# Patient Record
Sex: FEMALE | Race: WHITE | NOT HISPANIC OR LATINO | Employment: FULL TIME | URBAN - METROPOLITAN AREA
[De-identification: names, ages, dates, MRNs, and addresses within clinical notes are randomized per-mention and may not be internally consistent; named-entity substitution may affect disease eponyms.]

---

## 2018-03-05 ENCOUNTER — OFFICE VISIT (OUTPATIENT)
Dept: CARDIOLOGY | Facility: CLINIC | Age: 43
End: 2018-03-05

## 2018-03-05 VITALS — HEART RATE: 80 BPM | DIASTOLIC BLOOD PRESSURE: 86 MMHG | WEIGHT: 195 LBS | SYSTOLIC BLOOD PRESSURE: 147 MMHG

## 2018-03-05 DIAGNOSIS — R06.02 SOB (SHORTNESS OF BREATH): ICD-10-CM

## 2018-03-05 DIAGNOSIS — E66.09 CLASS 1 OBESITY DUE TO EXCESS CALORIES WITHOUT SERIOUS COMORBIDITY WITH BODY MASS INDEX (BMI) OF 30.0 TO 30.9 IN ADULT: Primary | ICD-10-CM

## 2018-03-05 DIAGNOSIS — R03.0 BORDERLINE SYSTOLIC HTN: ICD-10-CM

## 2018-03-05 DIAGNOSIS — R07.9 CHEST PAIN, UNSPECIFIED TYPE: ICD-10-CM

## 2018-03-05 PROBLEM — E66.811 CLASS 1 OBESITY DUE TO EXCESS CALORIES WITHOUT SERIOUS COMORBIDITY WITH BODY MASS INDEX (BMI) OF 30.0 TO 30.9 IN ADULT: Status: ACTIVE | Noted: 2018-03-05

## 2018-03-05 PROCEDURE — 93000 ELECTROCARDIOGRAM COMPLETE: CPT | Performed by: INTERNAL MEDICINE

## 2018-03-05 PROCEDURE — 99203 OFFICE O/P NEW LOW 30 MIN: CPT | Performed by: INTERNAL MEDICINE

## 2018-03-05 RX ORDER — BISOPROLOL FUMARATE AND HYDROCHLOROTHIAZIDE 5; 6.25 MG/1; MG/1
1 TABLET ORAL DAILY
Qty: 30 TABLET | Refills: 6 | Status: SHIPPED | OUTPATIENT
Start: 2018-03-05 | End: 2018-12-01 | Stop reason: SDUPTHER

## 2018-03-05 NOTE — PROGRESS NOTES
Subjective:        CC  SOB    PINCHING CP    WT GAIN         Maria Ines Garcia is a 42 y.o. female who  Is here for the cardiac evaluation as the patient has been complaining of increasing shortness of breath, along with pinching chest pain mild in intensity lasting for a few seconds on the left side of the chest, patient also has gained significant weight over a period of several years     Maria Ines Garcia has been complaining of the shortness of breath mild-to-moderate in intensity with mild-to-moderate usually relieved with rest associated with anxiety and fatigue    Cardiac risk factors: dyslipidemia, family history of premature cardiovascular disease, hypertension, obesity (BMI >= 30 kg/m2) and sedentary lifestyle.    Past Medical History:   Diagnosis Date   • Hyperlipidemia    • Hypertension     reports that she has quit smoking. She does not have any smokeless tobacco history on file. She reports that she drinks alcohol.  Family History   Problem Relation Age of Onset   • Heart disease Mother    • Hypertension Mother    • Heart disease Paternal Uncle    • Arrhythmia Maternal Grandmother         Review of Systems  Constitutional: No wt loss, fever   Gastrointestinal: No nausea , abdominal pain  Behavioral/Psych: No insomnia or anxiety  Cardiovascular ----positive for Chest pains and shortness of breath. All other systems reviewed and are negative    Objective:              Physical Exam  /86  Pulse 80  Wt 88.5 kg (195 lb)    General appearance: NAD, conversant   Eyes: anicteric sclerae, moist conjunctivae; no lid-lag; PERRLA   HENT: Atraumatic; oropharynx clear with moist mucous membranes and no mucosal ulcerations;  normal hard and soft palate   Neck: Trachea midline; FROM, supple, no thyromegaly or lymphadenopathy   Lungs: CTA, with normal respiratory effort and no intercostal retractions   CV: S1-S2 regular, no murmurs, no rub, no gallop   Abdomen: Soft, non-tender; no masses or HSM   Extremities: No  peripheral edema or extremity lymphadenopathy  Skin: Normal temperature, turgor and texture; no rash, ulcers or subcutaneous nodules   Psych: Appropriate affect, alert and oriented to person, place and time           Cardiographics  @  ECG 12 Lead  Date/Time: 3/5/2018 10:40 AM  Performed by: SARAH YOUNG  Authorized by: SARAH YOUNG   Comparison: not compared with previous ECG   Previous ECG: no previous ECG available  Rhythm: sinus rhythm  ST Flattening: all  Clinical impression: non-specific ECG            Echocardiogram:     Imaging  Chest x-ray: not done     Lab Review   not applicable       Current Outpatient Prescriptions:   •  Fexofenadine HCl (ALLERGY 24-HR PO), Take  by mouth., Disp: , Rfl:         Assessment:      1. Class 1 obesity due to excess calories without serious comorbidity with body mass index (BMI) of 30.0 to 30.9 in adult    2. Borderline systolic HTN    3. Chest pain, unspecified type    4. SOB (shortness of breath)        Patient Active Problem List   Diagnosis   • Class 1 obesity due to excess calories without serious comorbidity with body mass index (BMI) of 30.0 to 30.9 in adult   • Borderline systolic HTN   • Chest pain   • SOB (shortness of breath)         Plan:          1. Class 1 obesity due to excess calories without serious comorbidity with body mass index (BMI) of 30.0 to 30.9 in adult  Significant risk of obesity to CAD, HTN has been explained  Advantages of wt reduction has been explained    - Stress Test With Myocardial Perfusion One Day  - Adult Transthoracic Echo Complete W/ Cont if Necessary Per Protocol  - Lipid Panel  - Comprehensive Metabolic Panel  - Thyroid Panel With TSH    2. Borderline systolic HTN  Importance of controlling hypertension and blood pressure  checkup on the regular basis has been explained  Hypertension as a silent killer has been discussed  Risk reduction of the weight and regular exercises to control the hypertension has been  explained    - Stress Test With Myocardial Perfusion One Day  - Adult Transthoracic Echo Complete W/ Cont if Necessary Per Protocol  - Lipid Panel  - Comprehensive Metabolic Panel  - Thyroid Panel With TSH    3. Chest pain, unspecified type  Considering the patient's symptoms as well as clinical situation and  EKG findings, along with cardiac risk factors, ischemic workup is necessary to rule out ischemic cardiomyopathy, stress induced arrhythmias, and functional capacity for diagnosis as well as prognostic consideration    - ECG 12 Lead  - Stress Test With Myocardial Perfusion One Day  - Adult Transthoracic Echo Complete W/ Cont if Necessary Per Protocol  - Lipid Panel  - Comprehensive Metabolic Panel  - Thyroid Panel With TSH    4. SOB (shortness of breath)  Maria Ines Garcia has been complaining of the shortness of breath mild-to-moderate in intensity with mild-to-moderate usually relieved with rest associated with anxiety and fatigue    - Stress Test With Myocardial Perfusion One Day  - Adult Transthoracic Echo Complete W/ Cont if Necessary Per Protocol  - Lipid Panel  - Comprehensive Metabolic Panel  - Thyroid Panel With TSH    Pros and cons of ASA in primary and secondary prevention of CAD has been discussed.  Risks of bleeding and other possible side effects have been discussed, Diff advantages and disadvantages of 325 vs 81  Mg of ASA were discussed, at this stage it has been recommended to start ASA 81 mg /day     START ZIAC    sTRESS CARDIOLYTE/ ECHO    FLP/ CMP/ TSH    SEE US 1 MONTH    Pros and cons of ASA in primary and secondary prevention of CAD has been discussed.  Risks of bleeding and other possible side effects have been discussed, Diff advantages and disadvantages of 325 vs 81  Mg of ASA were discussed, at this stage it has been recommended to start ASA 81 mg /day           Counseling was given to Maria Ines Garcia for the following topics:  risk factor reductions, prognosis and risks and benefits of  treatment options .       SMOKING COUNSELING:    Counseling given: Not Answered      .  EMR Dragon/Transcription disclaimer:   Much of this encounter note is an electronic transcription/translation of spoken language to printed text. The electronic translation of spoken language may permit erroneous, or at times, nonsensical words or phrases to be inadvertently transcribed; Although I have reviewed the note for such errors, some may still exist.

## 2018-03-20 ENCOUNTER — HOSPITAL ENCOUNTER (OUTPATIENT)
Dept: CARDIOLOGY | Facility: HOSPITAL | Age: 43
Discharge: HOME | End: 2018-03-20
Attending: INTERNAL MEDICINE | Admitting: INTERNAL MEDICINE

## 2018-03-20 VITALS
BODY MASS INDEX: 33.29 KG/M2 | WEIGHT: 195 LBS | SYSTOLIC BLOOD PRESSURE: 116 MMHG | HEART RATE: 65 BPM | HEIGHT: 64 IN | DIASTOLIC BLOOD PRESSURE: 81 MMHG

## 2018-03-20 PROCEDURE — 93306 TTE W/DOPPLER COMPLETE: CPT | Performed by: INTERNAL MEDICINE

## 2018-03-20 PROCEDURE — 0399T ADULT TRANSTHORACIC ECHO COMPLETE W/ CONT IF NECESSARY PER PROTOCOL: CPT | Performed by: INTERNAL MEDICINE

## 2018-03-20 PROCEDURE — 93306 TTE W/DOPPLER COMPLETE: CPT

## 2018-03-20 PROCEDURE — 0399T HC MYOCARDL STRAIN IMAG QUAN ASSMT PER SESS: CPT

## 2018-03-21 LAB
BH CV ECHO MEAS - ACS: 1.6 CM
BH CV ECHO MEAS - AO MAX PG (FULL): 1.2 MMHG
BH CV ECHO MEAS - AO MAX PG: 8 MMHG
BH CV ECHO MEAS - AO MEAN PG (FULL): 1 MMHG
BH CV ECHO MEAS - AO MEAN PG: 4 MMHG
BH CV ECHO MEAS - AO ROOT AREA (BSA CORRECTED): 1.3
BH CV ECHO MEAS - AO ROOT AREA: 5.3 CM^2
BH CV ECHO MEAS - AO ROOT DIAM: 2.6 CM
BH CV ECHO MEAS - AO V2 MAX: 141 CM/SEC
BH CV ECHO MEAS - AO V2 MEAN: 90.7 CM/SEC
BH CV ECHO MEAS - AO V2 VTI: 27.8 CM
BH CV ECHO MEAS - AVA(I,A): 3 CM^2
BH CV ECHO MEAS - AVA(I,D): 3 CM^2
BH CV ECHO MEAS - AVA(V,A): 2.9 CM^2
BH CV ECHO MEAS - AVA(V,D): 2.9 CM^2
BH CV ECHO MEAS - BSA(HAYCOCK): 2 M^2
BH CV ECHO MEAS - BSA: 1.9 M^2
BH CV ECHO MEAS - BZI_BMI: 33.5 KILOGRAMS/M^2
BH CV ECHO MEAS - BZI_METRIC_HEIGHT: 162.6 CM
BH CV ECHO MEAS - BZI_METRIC_WEIGHT: 88.5 KG
BH CV ECHO MEAS - CONTRAST EF (2CH): 65.7 ML/M^2
BH CV ECHO MEAS - CONTRAST EF 4CH: 65.5 ML/M^2
BH CV ECHO MEAS - EDV(CUBED): 64 ML
BH CV ECHO MEAS - EDV(MOD-SP2): 67 ML
BH CV ECHO MEAS - EDV(MOD-SP4): 87 ML
BH CV ECHO MEAS - EDV(TEICH): 70 ML
BH CV ECHO MEAS - EF(CUBED): 78.4 %
BH CV ECHO MEAS - EF(MOD-SP2): 65.7 %
BH CV ECHO MEAS - EF(MOD-SP4): 65.5 %
BH CV ECHO MEAS - EF(TEICH): 71.2 %
BH CV ECHO MEAS - ESV(CUBED): 13.8 ML
BH CV ECHO MEAS - ESV(MOD-SP2): 23 ML
BH CV ECHO MEAS - ESV(MOD-SP4): 30 ML
BH CV ECHO MEAS - ESV(TEICH): 20.2 ML
BH CV ECHO MEAS - FS: 40 %
BH CV ECHO MEAS - IVS/LVPW: 1.2
BH CV ECHO MEAS - IVSD: 1.1 CM
BH CV ECHO MEAS - LA DIMENSION: 3.4 CM
BH CV ECHO MEAS - LA/AO: 1.3
BH CV ECHO MEAS - LAT PEAK E' VEL: 12.8 CM/SEC
BH CV ECHO MEAS - LV DIASTOLIC VOL/BSA (35-75): 45 ML/M^2
BH CV ECHO MEAS - LV MASS(C)D: 127.1 GRAMS
BH CV ECHO MEAS - LV MASS(C)DI: 65.7 GRAMS/M^2
BH CV ECHO MEAS - LV MAX PG: 6.8 MMHG
BH CV ECHO MEAS - LV MEAN PG: 3 MMHG
BH CV ECHO MEAS - LV SYSTOLIC VOL/BSA (12-30): 15.5 ML/M^2
BH CV ECHO MEAS - LV V1 MAX: 130 CM/SEC
BH CV ECHO MEAS - LV V1 MEAN: 83.6 CM/SEC
BH CV ECHO MEAS - LV V1 VTI: 26.9 CM
BH CV ECHO MEAS - LVIDD: 4 CM
BH CV ECHO MEAS - LVIDS: 2.4 CM
BH CV ECHO MEAS - LVLD AP2: 7.8 CM
BH CV ECHO MEAS - LVLD AP4: 8.2 CM
BH CV ECHO MEAS - LVLS AP2: 6.4 CM
BH CV ECHO MEAS - LVLS AP4: 7 CM
BH CV ECHO MEAS - LVOT AREA (M): 3.1 CM^2
BH CV ECHO MEAS - LVOT AREA: 3.1 CM^2
BH CV ECHO MEAS - LVOT DIAM: 2 CM
BH CV ECHO MEAS - LVPWD: 0.9 CM
BH CV ECHO MEAS - MED PEAK E' VEL: 10.7 CM/SEC
BH CV ECHO MEAS - MV A DUR: 0.2 SEC
BH CV ECHO MEAS - MV A MAX VEL: 82.5 CM/SEC
BH CV ECHO MEAS - MV DEC SLOPE: 387 CM/SEC^2
BH CV ECHO MEAS - MV DEC TIME: 0.18 SEC
BH CV ECHO MEAS - MV E MAX VEL: 118 CM/SEC
BH CV ECHO MEAS - MV E/A: 1.4
BH CV ECHO MEAS - MV MAX PG: 5.9 MMHG
BH CV ECHO MEAS - MV MEAN PG: 2 MMHG
BH CV ECHO MEAS - MV P1/2T MAX VEL: 119 CM/SEC
BH CV ECHO MEAS - MV P1/2T: 90.1 MSEC
BH CV ECHO MEAS - MV V2 MAX: 121 CM/SEC
BH CV ECHO MEAS - MV V2 MEAN: 70.1 CM/SEC
BH CV ECHO MEAS - MV V2 VTI: 32.9 CM
BH CV ECHO MEAS - MVA P1/2T LCG: 1.8 CM^2
BH CV ECHO MEAS - MVA(P1/2T): 2.4 CM^2
BH CV ECHO MEAS - MVA(VTI): 2.6 CM^2
BH CV ECHO MEAS - PA ACC TIME: 0.15 SEC
BH CV ECHO MEAS - PA MAX PG (FULL): 2.9 MMHG
BH CV ECHO MEAS - PA MAX PG: 4.4 MMHG
BH CV ECHO MEAS - PA PR(ACCEL): 12.4 MMHG
BH CV ECHO MEAS - PA V2 MAX: 105 CM/SEC
BH CV ECHO MEAS - PULM A REVS DUR: 0.15 SEC
BH CV ECHO MEAS - PULM A REVS VEL: 35.4 CM/SEC
BH CV ECHO MEAS - PULM DIAS VEL: 36.9 CM/SEC
BH CV ECHO MEAS - PULM S/D: 1.5
BH CV ECHO MEAS - PULM SYS VEL: 54.8 CM/SEC
BH CV ECHO MEAS - PVA(V,A): 2.2 CM^2
BH CV ECHO MEAS - PVA(V,D): 2.2 CM^2
BH CV ECHO MEAS - QP/QS: 0.58
BH CV ECHO MEAS - RAP SYSTOLE: 3 MMHG
BH CV ECHO MEAS - RV MAX PG: 1.5 MMHG
BH CV ECHO MEAS - RV MEAN PG: 1 MMHG
BH CV ECHO MEAS - RV V1 MAX: 62.1 CM/SEC
BH CV ECHO MEAS - RV V1 MEAN: 40.9 CM/SEC
BH CV ECHO MEAS - RV V1 VTI: 12.8 CM
BH CV ECHO MEAS - RVDD: 2.2 CM
BH CV ECHO MEAS - RVOT AREA: 3.8 CM^2
BH CV ECHO MEAS - RVOT DIAM: 2.2 CM
BH CV ECHO MEAS - RVSP: 20.5 MMHG
BH CV ECHO MEAS - SI(AO): 76.3 ML/M^2
BH CV ECHO MEAS - SI(CUBED): 25.9 ML/M^2
BH CV ECHO MEAS - SI(LVOT): 43.7 ML/M^2
BH CV ECHO MEAS - SI(MOD-SP2): 22.7 ML/M^2
BH CV ECHO MEAS - SI(MOD-SP4): 29.5 ML/M^2
BH CV ECHO MEAS - SI(TEICH): 25.8 ML/M^2
BH CV ECHO MEAS - SV(AO): 147.6 ML
BH CV ECHO MEAS - SV(CUBED): 50.2 ML
BH CV ECHO MEAS - SV(LVOT): 84.5 ML
BH CV ECHO MEAS - SV(MOD-SP2): 44 ML
BH CV ECHO MEAS - SV(MOD-SP4): 57 ML
BH CV ECHO MEAS - SV(RVOT): 48.7 ML
BH CV ECHO MEAS - SV(TEICH): 49.8 ML
BH CV ECHO MEAS - TAPSE (>1.6): 2.8 CM2
BH CV ECHO MEAS - TR MAX VEL: 209 CM/SEC
BH CV XLRA - RV BASE: 3.2 CM
BH CV XLRA - RV LENGTH: 6.6 CM
BH CV XLRA - RV MID: 2.2 CM
BH CV XLRA - TDI S': 13.7 CM/SEC
E/E' RATIO: 10.1
LEFT ATRIUM VOLUME INDEX: 21 ML/M2
MAXIMAL PREDICTED HEART RATE: 176 BPM
STRESS TARGET HR: 150 BPM

## 2018-03-22 ENCOUNTER — APPOINTMENT (OUTPATIENT)
Dept: CARDIOLOGY | Facility: HOSPITAL | Age: 43
End: 2018-03-22

## 2018-03-27 ENCOUNTER — HOSPITAL ENCOUNTER (OUTPATIENT)
Dept: CARDIOLOGY | Facility: HOSPITAL | Age: 43
Discharge: HOME | End: 2018-03-27
Admitting: INTERNAL MEDICINE

## 2018-03-27 ENCOUNTER — HOSPITAL ENCOUNTER (OUTPATIENT)
Dept: CARDIOLOGY | Facility: HOSPITAL | Age: 43
Discharge: HOME | End: 2018-03-27
Attending: INTERNAL MEDICINE

## 2018-03-27 VITALS
HEIGHT: 64 IN | DIASTOLIC BLOOD PRESSURE: 70 MMHG | OXYGEN SATURATION: 98 % | RESPIRATION RATE: 18 BRPM | HEART RATE: 62 BPM | BODY MASS INDEX: 33.29 KG/M2 | SYSTOLIC BLOOD PRESSURE: 107 MMHG | WEIGHT: 195 LBS

## 2018-03-27 LAB
ALBUMIN SERPL-MCNC: 4.2 G/DL (ref 3.5–5.2)
ALBUMIN/GLOB SERPL: 1.6 G/DL
ALP SERPL-CCNC: 80 U/L (ref 39–117)
ALT SERPL W P-5'-P-CCNC: 12 U/L (ref 1–33)
ANION GAP SERPL CALCULATED.3IONS-SCNC: 11.3 MMOL/L
AST SERPL-CCNC: 16 U/L (ref 1–32)
BILIRUB SERPL-MCNC: 0.4 MG/DL (ref 0.1–1.2)
BUN BLD-MCNC: 11 MG/DL (ref 6–20)
BUN/CREAT SERPL: 13.3 (ref 7–25)
CALCIUM SPEC-SCNC: 9.6 MG/DL (ref 8.6–10.5)
CHLORIDE SERPL-SCNC: 100 MMOL/L (ref 98–107)
CHOLEST SERPL-MCNC: 202 MG/DL (ref 0–200)
CO2 SERPL-SCNC: 28.7 MMOL/L (ref 22–29)
CREAT BLD-MCNC: 0.83 MG/DL (ref 0.57–1)
GFR SERPL CREATININE-BSD FRML MDRD: 75 ML/MIN/1.73
GLOBULIN UR ELPH-MCNC: 2.6 GM/DL
GLUCOSE BLD-MCNC: 114 MG/DL (ref 65–99)
HDLC SERPL-MCNC: 47 MG/DL (ref 40–60)
LDLC SERPL CALC-MCNC: 122 MG/DL (ref 0–100)
LDLC/HDLC SERPL: 2.59 {RATIO}
POTASSIUM BLD-SCNC: 4.5 MMOL/L (ref 3.5–5.2)
PROT SERPL-MCNC: 6.8 G/DL (ref 6–8.5)
SODIUM BLD-SCNC: 140 MMOL/L (ref 136–145)
T-UPTAKE NFR SERPL: 1.1 TBI (ref 0.8–1.3)
T4 SERPL-MCNC: 6.57 MCG/DL (ref 4.5–11.7)
TRIGL SERPL-MCNC: 166 MG/DL (ref 0–150)
TSH SERPL DL<=0.05 MIU/L-ACNC: 2.13 MIU/ML (ref 0.27–4.2)
VLDLC SERPL-MCNC: 33.2 MG/DL (ref 5–40)

## 2018-03-27 PROCEDURE — 80053 COMPREHEN METABOLIC PANEL: CPT | Performed by: INTERNAL MEDICINE

## 2018-03-27 PROCEDURE — 78452 HT MUSCLE IMAGE SPECT MULT: CPT

## 2018-03-27 PROCEDURE — 78452 HT MUSCLE IMAGE SPECT MULT: CPT | Performed by: INTERNAL MEDICINE

## 2018-03-27 PROCEDURE — 93016 CV STRESS TEST SUPVJ ONLY: CPT | Performed by: INTERNAL MEDICINE

## 2018-03-27 PROCEDURE — A9500 TC99M SESTAMIBI: HCPCS | Performed by: INTERNAL MEDICINE

## 2018-03-27 PROCEDURE — 93017 CV STRESS TEST TRACING ONLY: CPT

## 2018-03-27 PROCEDURE — 93018 CV STRESS TEST I&R ONLY: CPT | Performed by: INTERNAL MEDICINE

## 2018-03-27 PROCEDURE — 0 TECHNETIUM SESTAMIBI: Performed by: INTERNAL MEDICINE

## 2018-03-27 PROCEDURE — 84436 ASSAY OF TOTAL THYROXINE: CPT | Performed by: INTERNAL MEDICINE

## 2018-03-27 PROCEDURE — 36415 COLL VENOUS BLD VENIPUNCTURE: CPT | Performed by: INTERNAL MEDICINE

## 2018-03-27 PROCEDURE — 84479 ASSAY OF THYROID (T3 OR T4): CPT | Performed by: INTERNAL MEDICINE

## 2018-03-27 PROCEDURE — 80061 LIPID PANEL: CPT | Performed by: INTERNAL MEDICINE

## 2018-03-27 PROCEDURE — 84443 ASSAY THYROID STIM HORMONE: CPT | Performed by: INTERNAL MEDICINE

## 2018-03-27 RX ADMIN — TECHNETIUM TC 99M SESTAMIBI 1 DOSE: 1 INJECTION INTRAVENOUS at 09:10

## 2018-03-27 RX ADMIN — TECHNETIUM TC 99M SESTAMIBI 1 DOSE: 1 INJECTION INTRAVENOUS at 07:46

## 2018-03-28 ENCOUNTER — OFFICE VISIT (OUTPATIENT)
Dept: CARDIOLOGY | Facility: CLINIC | Age: 43
End: 2018-03-28

## 2018-03-28 VITALS
SYSTOLIC BLOOD PRESSURE: 107 MMHG | HEIGHT: 64 IN | DIASTOLIC BLOOD PRESSURE: 69 MMHG | BODY MASS INDEX: 32.78 KG/M2 | HEART RATE: 65 BPM | WEIGHT: 192 LBS

## 2018-03-28 DIAGNOSIS — E66.09 CLASS 1 OBESITY DUE TO EXCESS CALORIES WITHOUT SERIOUS COMORBIDITY WITH BODY MASS INDEX (BMI) OF 30.0 TO 30.9 IN ADULT: ICD-10-CM

## 2018-03-28 DIAGNOSIS — R07.9 CHEST PAIN, UNSPECIFIED TYPE: ICD-10-CM

## 2018-03-28 DIAGNOSIS — R06.02 SOB (SHORTNESS OF BREATH): Primary | ICD-10-CM

## 2018-03-28 DIAGNOSIS — R03.0 BORDERLINE SYSTOLIC HTN: ICD-10-CM

## 2018-03-28 LAB
BH CV STRESS BP STAGE 1: NORMAL
BH CV STRESS BP STAGE 3: NORMAL
BH CV STRESS DURATION MIN STAGE 1: 3
BH CV STRESS DURATION MIN STAGE 2: 3
BH CV STRESS DURATION MIN STAGE 3: 1
BH CV STRESS DURATION SEC STAGE 1: 0
BH CV STRESS DURATION SEC STAGE 2: 0
BH CV STRESS DURATION SEC STAGE 3: 1
BH CV STRESS GRADE STAGE 1: 10
BH CV STRESS GRADE STAGE 2: 12
BH CV STRESS GRADE STAGE 3: 14
BH CV STRESS HR STAGE 1: 106
BH CV STRESS HR STAGE 2: 148
BH CV STRESS HR STAGE 3: 169
BH CV STRESS METS STAGE 1: 4.6
BH CV STRESS METS STAGE 2: 7
BH CV STRESS METS STAGE 3: 8.5
BH CV STRESS PROTOCOL 1: NORMAL
BH CV STRESS RECOVERY BP: NORMAL MMHG
BH CV STRESS RECOVERY HR: 87 BPM
BH CV STRESS RECOVERY O2: 98 %
BH CV STRESS SPEED STAGE 1: 1.7
BH CV STRESS SPEED STAGE 2: 2.5
BH CV STRESS SPEED STAGE 3: 3.4
BH CV STRESS STAGE 1: 1
BH CV STRESS STAGE 2: 2
BH CV STRESS STAGE 3: 3
LV EF NUC BP: 75 %
MAXIMAL PREDICTED HEART RATE: 176 BPM
PERCENT MAX PREDICTED HR: 96.02 %
STRESS BASELINE BP: NORMAL MMHG
STRESS BASELINE HR: 59 BPM
STRESS O2 SAT REST: 98 %
STRESS PERCENT HR: 113 %
STRESS POST ESTIMATED WORKLOAD: 8.5 METS
STRESS POST EXERCISE DUR MIN: 7 MIN
STRESS POST EXERCISE DUR SEC: 1 SEC
STRESS POST PEAK BP: NORMAL MMHG
STRESS POST PEAK HR: 169 BPM
STRESS TARGET HR: 150 BPM

## 2018-03-28 PROCEDURE — 99213 OFFICE O/P EST LOW 20 MIN: CPT | Performed by: INTERNAL MEDICINE

## 2018-07-10 ENCOUNTER — OFFICE VISIT (OUTPATIENT)
Dept: OBSTETRICS AND GYNECOLOGY | Facility: CLINIC | Age: 43
End: 2018-07-10

## 2018-07-10 VITALS
HEART RATE: 75 BPM | WEIGHT: 194 LBS | BODY MASS INDEX: 33.12 KG/M2 | SYSTOLIC BLOOD PRESSURE: 106 MMHG | DIASTOLIC BLOOD PRESSURE: 68 MMHG | HEIGHT: 64 IN

## 2018-07-10 DIAGNOSIS — N92.0 MENORRHAGIA WITH REGULAR CYCLE: ICD-10-CM

## 2018-07-10 DIAGNOSIS — Z01.419 VISIT FOR GYNECOLOGIC EXAMINATION: Primary | ICD-10-CM

## 2018-07-10 PROCEDURE — 99386 PREV VISIT NEW AGE 40-64: CPT | Performed by: OBSTETRICS & GYNECOLOGY

## 2018-07-10 RX ORDER — ASPIRIN 81 MG/1
81 TABLET, CHEWABLE ORAL DAILY
COMMUNITY

## 2018-07-10 NOTE — PROGRESS NOTES
"[facility]  [address]  [place], [place] [address]  Phone: [phone] / Fax:  [phone]      07/10/2018    [address] [facility] [place] [address]    Geovanni Rose MD    Chief Complaint   Patient presents with   • Gynecologic Exam     Np Annual, last exam 10 yrs ago. Pt does c/o mood swings and night sweats x6-8 months..       Maria Ines Garcia is here for annual gynecologic exam.  HPI - Patient has not had pap in 10 years and last mammogram was 4 to 5 years ago.  She complains of hot flashes/night sweats.  She also has \"clotting\" with cycles that soils her clothes.  She has not had a work up.     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     Social History Main Topics   • Smoking status: Former Smoker     Packs/day: 1.50     Years: 21.00   • Smokeless tobacco: Never Used   • Alcohol use Yes      Comment: soc   • Drug use: No   • Sexual activity: Yes     Birth control/ protection: Surgical     Other Topics Concern   • Not on file     Social History Narrative   • No narrative on file       Family History   Problem Relation Age of Onset   • Heart disease Mother    • Hypertension Mother    • Heart disease Paternal Uncle    • Arrhythmia Maternal Grandmother    • Emphysema Father    • COPD Father        Patient's last menstrual period was 2018 (exact date).    OB History      Para Term  AB Living    3       1      SAB TAB " "Ectopic Molar Multiple Live Births    1         2          Vitals:    07/10/18 1331   BP: 106/68   Pulse: 75   Weight: 88 kg (194 lb)   Height: 162.6 cm (64\")       Physical Exam   Constitutional: She appears well-developed and well-nourished.   Genitourinary: Vagina normal and uterus normal. Pelvic exam was performed with patient supine. There is no tenderness or lesion on the right labia. There is no tenderness or lesion on the left labia. Right adnexum does not display tenderness and does not display fullness. Left adnexum does not display tenderness and does not display fullness. Cervix does not exhibit motion tenderness or lesion.   HENT:   Right Ear: External ear normal.   Left Ear: External ear normal.   Nose: Nose normal.   Eyes: Conjunctivae are normal.   Neck: Normal range of motion. Neck supple. No thyromegaly present.   Cardiovascular: Normal rate, regular rhythm and normal heart sounds.    Pulmonary/Chest: Effort normal. She has no wheezes. Right breast exhibits no mass and no nipple discharge. Left breast exhibits no mass and no nipple discharge.   Abdominal: Soft. There is no tenderness. There is no guarding.   Musculoskeletal: Normal range of motion. She exhibits no edema.   Neurological: She is alert. Coordination normal.   Skin: Skin is warm and dry.   Psychiatric: She has a normal mood and affect. Her behavior is normal. Judgment and thought content normal.   Vitals reviewed.      Maria Ines was seen today for gynecologic exam.    Diagnoses and all orders for this visit:    Visit for gynecologic examination  -     IgP, Aptima HPV  -     Discussed importance of regular screening, breast awareness and mammograms.    Menorrhagia with regular cycle  -     Follicle Stimulating Hormone  -     TSH Rfx On Abnormal To Free T4  -     CBC (No Diff)  -     Work up for bleeding including sonogram with follow up.        Mukund Castro MD      "

## 2018-07-11 LAB
ERYTHROCYTE [DISTWIDTH] IN BLOOD BY AUTOMATED COUNT: 13.7 % (ref 12.3–15.4)
FSH SERPL-ACNC: 7.4 MIU/ML
HCT VFR BLD AUTO: 38.2 % (ref 34–46.6)
HGB BLD-MCNC: 13 G/DL (ref 11.1–15.9)
MCH RBC QN AUTO: 27.6 PG (ref 26.6–33)
MCHC RBC AUTO-ENTMCNC: 34 G/DL (ref 31.5–35.7)
MCV RBC AUTO: 81 FL (ref 79–97)
PLATELET # BLD AUTO: 236 X10E3/UL (ref 150–379)
RBC # BLD AUTO: 4.71 X10E6/UL (ref 3.77–5.28)
TSH SERPL DL<=0.005 MIU/L-ACNC: 1.16 UIU/ML (ref 0.45–4.5)
WBC # BLD AUTO: 9.7 X10E3/UL (ref 3.4–10.8)

## 2018-07-12 ENCOUNTER — TELEPHONE (OUTPATIENT)
Dept: OBSTETRICS AND GYNECOLOGY | Facility: CLINIC | Age: 43
End: 2018-07-12

## 2018-07-12 LAB
CYTOLOGIST CVX/VAG CYTO: NORMAL
CYTOLOGY CVX/VAG DOC THIN PREP: NORMAL
DX ICD CODE: NORMAL
HIV 1 & 2 AB SER-IMP: NORMAL
HPV I/H RISK 4 DNA CVX QL PROBE+SIG AMP: NEGATIVE
OTHER STN SPEC: NORMAL
PATH REPORT.FINAL DX SPEC: NORMAL
STAT OF ADQ CVX/VAG CYTO-IMP: NORMAL

## 2018-07-12 NOTE — TELEPHONE ENCOUNTER
----- Message from Mukund Castro MD sent at 7/12/2018  3:41 PM EDT -----  LAW - Let her know that her tests were normal.

## 2018-08-15 ENCOUNTER — PROCEDURE VISIT (OUTPATIENT)
Dept: OBSTETRICS AND GYNECOLOGY | Facility: CLINIC | Age: 43
End: 2018-08-15

## 2018-08-15 ENCOUNTER — OFFICE VISIT (OUTPATIENT)
Dept: OBSTETRICS AND GYNECOLOGY | Facility: CLINIC | Age: 43
End: 2018-08-15

## 2018-08-15 ENCOUNTER — APPOINTMENT (OUTPATIENT)
Dept: WOMENS IMAGING | Facility: HOSPITAL | Age: 43
End: 2018-08-15

## 2018-08-15 VITALS
HEART RATE: 65 BPM | BODY MASS INDEX: 33.12 KG/M2 | HEIGHT: 64 IN | SYSTOLIC BLOOD PRESSURE: 106 MMHG | DIASTOLIC BLOOD PRESSURE: 73 MMHG | WEIGHT: 194 LBS

## 2018-08-15 DIAGNOSIS — D25.9 UTERINE LEIOMYOMA, UNSPECIFIED LOCATION: ICD-10-CM

## 2018-08-15 DIAGNOSIS — N92.0 MENORRHAGIA WITH REGULAR CYCLE: Primary | ICD-10-CM

## 2018-08-15 DIAGNOSIS — Z12.31 VISIT FOR SCREENING MAMMOGRAM: Primary | ICD-10-CM

## 2018-08-15 DIAGNOSIS — R93.89 THICKENED ENDOMETRIUM: ICD-10-CM

## 2018-08-15 PROCEDURE — 77067 SCR MAMMO BI INCL CAD: CPT | Performed by: RADIOLOGY

## 2018-08-15 PROCEDURE — 76830 TRANSVAGINAL US NON-OB: CPT | Performed by: OBSTETRICS & GYNECOLOGY

## 2018-08-15 PROCEDURE — 58100 BIOPSY OF UTERUS LINING: CPT | Performed by: OBSTETRICS & GYNECOLOGY

## 2018-08-15 PROCEDURE — 99214 OFFICE O/P EST MOD 30 MIN: CPT | Performed by: OBSTETRICS & GYNECOLOGY

## 2018-08-15 RX ORDER — NORETHINDRONE ACETATE AND ETHINYL ESTRADIOL 1MG-20(21)
1 KIT ORAL DAILY
Qty: 28 TABLET | Refills: 12 | Status: SHIPPED | OUTPATIENT
Start: 2018-08-15 | End: 2019-08-15

## 2018-08-15 NOTE — PROGRESS NOTES
Subjective   Maria Ines Garcia is a 42 y.o. female.     Cc:  Heavy periods    History of Present Illness - 42 year old female with history of heavy cycles over past year.  Patient passes clots and uses multiple pads/tampons on heaviest day.  Last cycle lasted 7 days.  Patient not on any therapy or tried any treatment.  She underwent work up, including normal CBC, FSH and TSH.  Sonogram today with 3 small fibroids; however, one fibroid appears in the endometrial cavity.  The lining of uterus is slightly thickened.    The following portions of the patient's history were reviewed and updated as appropriate:   She  has a past medical history of Hyperlipidemia; Hypertension; and Seasonal allergies.  She  reports that she has quit smoking. She has a 31.50 pack-year smoking history. She has never used smokeless tobacco. She reports that she drinks alcohol. She reports that she does not use drugs.  Current Outpatient Prescriptions   Medication Sig Dispense Refill   • aspirin 81 MG chewable tablet Chew 81 mg Daily.     • bisoprolol-hydrochlorothiazide (ZIAC) 5-6.25 MG per tablet Take 1 tablet by mouth Daily. 30 tablet 6   • Cyanocobalamin (VITAMIN B 12 PO) Take  by mouth.     • Fexofenadine HCl (ALLERGY 24-HR PO) Take  by mouth.     • GARLIC PO Take  by mouth.     • norethindrone-ethinyl estradiol FE (MICROGESTIN FE 1/20) 1-20 MG-MCG per tablet Take 1 tablet by mouth Daily. 28 tablet 12     No current facility-administered medications for this visit.      She has No Known Allergies..    Review of Systems   Genitourinary: Positive for menstrual problem and vaginal bleeding. Negative for pelvic pain and vaginal pain.       Objective   Physical Exam   Constitutional: She appears well-developed and well-nourished.   Genitourinary: Vagina normal. Pelvic exam was performed with patient supine. There is no tenderness or lesion on the right labia. There is no tenderness or lesion on the left labia. Cervix exhibits no motion tenderness, no  discharge and no friability.   Neurological: She is alert. Coordination normal.   Skin: Skin is warm and dry.   Psychiatric: She has a normal mood and affect. Her behavior is normal. Judgment and thought content normal.   Vitals reviewed.    Procedure Note:  Pre/Postprocedure Dx - endometrial thickening.  Procedure - Endometrial biopsy.  Description of procedure - intent of procedure discussed and verbal consent obtained.  [place] speculum placed in vagina and cervix easily identified.  The anterior lip of the cervix was grasped with a single tooth tenaculum and the cervix was prepped with betadine.  A small cervical dilator was placed to open internal os.  The pipelle was placed and tissue was easily obtained from uterine cavity.  This was sent for permanent pathology.  All instruments were removed from the vagina and patient tolerated procedure well.    Assessment/Plan   Maria Ines was seen today for follow-up.    Diagnoses and all orders for this visit:    Menorrhagia with regular cycle  -     norethindrone-ethinyl estradiol FE (MICROGESTIN FE 1/20) 1-20 MG-MCG per tablet; Take 1 tablet by mouth Daily.  -     Discussed various options including medical therapy versus ablation/myosure with removal of fibroid versus hysterectomy.  Each method was explored and pros/cons of each was discussed and reviewed.  Patient would like to proceed with medication.  If no improvement, consider myosure/ablation.  Ultrasound recommended in 12 months.    Thickened endometrium  -     Endometrial Biopsy  -     Reference Histopathology; Future  -     Await biopsy results.    I spent 25 minutes with patient and greater than 20 minutes in counseling face to face on above issues.     Mukund Castro MD

## 2018-08-17 LAB
DX ICD CODE: NORMAL
PATH REPORT.FINAL DX SPEC: NORMAL
PATH REPORT.GROSS SPEC: NORMAL
PATH REPORT.SITE OF ORIGIN SPEC: NORMAL
PATHOLOGIST NAME: NORMAL
PAYMENT PROCEDURE: NORMAL

## 2018-08-21 ENCOUNTER — TELEPHONE (OUTPATIENT)
Dept: OBSTETRICS AND GYNECOLOGY | Facility: CLINIC | Age: 43
End: 2018-08-21

## 2018-08-21 NOTE — TELEPHONE ENCOUNTER
Pt aware of negative results. 8-21-18/lw.  ----- Message from Mukund Castro MD sent at 8/20/2018  4:59 PM EDT -----  LAW - Please let her know that her results from her biopsy were negative for cancer.  Her results were good.

## 2018-12-03 RX ORDER — BISOPROLOL FUMARATE AND HYDROCHLOROTHIAZIDE 5; 6.25 MG/1; MG/1
1 TABLET ORAL DAILY
Qty: 30 TABLET | Refills: 6 | Status: SHIPPED | OUTPATIENT
Start: 2018-12-03 | End: 2019-03-25 | Stop reason: SDUPTHER

## 2019-03-25 ENCOUNTER — OFFICE VISIT (OUTPATIENT)
Dept: CARDIOLOGY | Facility: CLINIC | Age: 44
End: 2019-03-25

## 2019-03-25 VITALS
WEIGHT: 189 LBS | HEART RATE: 73 BPM | SYSTOLIC BLOOD PRESSURE: 129 MMHG | DIASTOLIC BLOOD PRESSURE: 75 MMHG | HEIGHT: 64 IN | BODY MASS INDEX: 32.27 KG/M2

## 2019-03-25 DIAGNOSIS — R07.9 CHEST PAIN, UNSPECIFIED TYPE: ICD-10-CM

## 2019-03-25 DIAGNOSIS — R06.02 SOB (SHORTNESS OF BREATH): ICD-10-CM

## 2019-03-25 DIAGNOSIS — E66.09 CLASS 1 OBESITY DUE TO EXCESS CALORIES WITHOUT SERIOUS COMORBIDITY WITH BODY MASS INDEX (BMI) OF 30.0 TO 30.9 IN ADULT: ICD-10-CM

## 2019-03-25 DIAGNOSIS — I10 ESSENTIAL HYPERTENSION: Primary | ICD-10-CM

## 2019-03-25 DIAGNOSIS — R03.0 BORDERLINE SYSTOLIC HTN: ICD-10-CM

## 2019-03-25 PROCEDURE — 99213 OFFICE O/P EST LOW 20 MIN: CPT | Performed by: INTERNAL MEDICINE

## 2019-03-25 PROCEDURE — 93000 ELECTROCARDIOGRAM COMPLETE: CPT | Performed by: INTERNAL MEDICINE

## 2019-03-25 RX ORDER — BISOPROLOL FUMARATE AND HYDROCHLOROTHIAZIDE 5; 6.25 MG/1; MG/1
1 TABLET ORAL DAILY
Qty: 90 TABLET | Refills: 4 | Status: SHIPPED | OUTPATIENT
Start: 2019-03-25 | End: 2020-03-18 | Stop reason: SDUPTHER

## 2019-03-25 RX ORDER — DOXYCYCLINE 50 MG/1
CAPSULE ORAL
Refills: 11 | COMMUNITY
Start: 2019-03-21 | End: 2019-08-19

## 2019-08-19 ENCOUNTER — OFFICE VISIT (OUTPATIENT)
Dept: OBSTETRICS AND GYNECOLOGY | Facility: CLINIC | Age: 44
End: 2019-08-19

## 2019-08-19 ENCOUNTER — PROCEDURE VISIT (OUTPATIENT)
Dept: OBSTETRICS AND GYNECOLOGY | Facility: CLINIC | Age: 44
End: 2019-08-19

## 2019-08-19 ENCOUNTER — APPOINTMENT (OUTPATIENT)
Dept: WOMENS IMAGING | Facility: HOSPITAL | Age: 44
End: 2019-08-19

## 2019-08-19 VITALS
SYSTOLIC BLOOD PRESSURE: 128 MMHG | WEIGHT: 190 LBS | DIASTOLIC BLOOD PRESSURE: 74 MMHG | HEIGHT: 64 IN | BODY MASS INDEX: 32.44 KG/M2

## 2019-08-19 DIAGNOSIS — Z12.31 VISIT FOR SCREENING MAMMOGRAM: Primary | ICD-10-CM

## 2019-08-19 DIAGNOSIS — D25.1 INTRAMURAL AND SUBMUCOUS LEIOMYOMA OF UTERUS: ICD-10-CM

## 2019-08-19 DIAGNOSIS — D25.0 INTRAMURAL AND SUBMUCOUS LEIOMYOMA OF UTERUS: ICD-10-CM

## 2019-08-19 DIAGNOSIS — Z01.419 VISIT FOR GYNECOLOGIC EXAMINATION: Primary | ICD-10-CM

## 2019-08-19 PROCEDURE — 77067 SCR MAMMO BI INCL CAD: CPT | Performed by: OBSTETRICS & GYNECOLOGY

## 2019-08-19 PROCEDURE — 99396 PREV VISIT EST AGE 40-64: CPT | Performed by: OBSTETRICS & GYNECOLOGY

## 2019-08-19 PROCEDURE — 77067 SCR MAMMO BI INCL CAD: CPT | Performed by: RADIOLOGY

## 2019-08-19 RX ORDER — NORETHINDRONE ACETATE AND ETHINYL ESTRADIOL 1MG-20(21)
1 KIT ORAL DAILY
Qty: 28 TABLET | Refills: 12 | Status: SHIPPED | OUTPATIENT
Start: 2019-08-19 | End: 2019-08-31 | Stop reason: SDUPTHER

## 2019-08-19 NOTE — PROGRESS NOTES
[facility]  [address]  [place], [place] [address]  Phone: [phone] / Fax:  [phone]      2019    [address] [facility] [place] [address]    Geovanni Rose MD    Chief Complaint   Patient presents with   • Gynecologic Exam     Annual Exam, last pap  7-10-18 Nl HPV (-), mammogram today.       Maria Ines Garcia is here for annual gynecologic exam.  HPI  - Patient did mammogram today.  She was placed on OCP for cycle regulation due to heavy periods with etiology related to fibroids.  For the most part, her periods have improved.  She has heavy bleeding on the first day of cycle but improved compared to not being on OCP.  Also, she has cramping toward the end of the cycle that does interfere with her activities.    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   • TONSILLECTOMY AND ADENOIDECTOMY     • TUBAL ABDOMINAL LIGATION         No Known Allergies    Social History     Socioeconomic History   • Marital status:      Spouse name: Not on file   • Number of children: Not on file   • Years of education: Not on file   • Highest education level: Not on file   Tobacco Use   • Smoking status: Former Smoker     Packs/day: 1.50     Years: 21.00     Pack years: 31.50   • Smokeless tobacco: Never Used   Substance and Sexual Activity   • Alcohol use: Yes     Comment: soc   • Drug use: No   • Sexual activity: Yes     Birth control/protection: Surgical       Family History   Problem Relation Age of Onset   • Heart disease Mother    • Hypertension Mother    • Heart disease Paternal Uncle    • Arrhythmia Maternal Grandmother    • Emphysema Father    • COPD Father        Patient's last menstrual period was  "2019 (approximate).    OB History      Para Term  AB Living    3       1      SAB TAB Ectopic Molar Multiple Live Births    1         2          Vitals:    19 1538   BP: 128/74   Weight: 86.2 kg (190 lb)   Height: 162.6 cm (64\")       Physical Exam   Constitutional: She appears well-developed and well-nourished.   Genitourinary: Vagina normal and uterus normal. Pelvic exam was performed with patient supine. There is no tenderness or lesion on the right labia. There is no tenderness or lesion on the left labia. Right adnexum does not display tenderness and does not display fullness. Left adnexum does not display tenderness and does not display fullness. Cervix does not exhibit motion tenderness or lesion.   HENT:   Right Ear: External ear normal.   Left Ear: External ear normal.   Nose: Nose normal.   Eyes: Conjunctivae are normal.   Neck: Normal range of motion. Neck supple. No thyromegaly present.   Cardiovascular: Normal rate, regular rhythm and normal heart sounds.   Pulmonary/Chest: Effort normal. Right breast exhibits no mass and no nipple discharge. Left breast exhibits no mass and no nipple discharge.   Abdominal: Soft. There is no tenderness. There is no guarding.   Musculoskeletal: Normal range of motion. She exhibits no edema.   Neurological: She is alert. Coordination normal.   Skin: Skin is warm and dry.   Psychiatric: She has a normal mood and affect. Her behavior is normal. Judgment and thought content normal.   Vitals reviewed.      Maria Ines was seen today for gynecologic exam.    Diagnoses and all orders for this visit:    Visit for gynecologic examination        -     Discussed importance of regular screening and breast awareness.  Intramural and submucous leiomyoma of uterus  -     norethindrone-ethinyl estradiol FE (MICROGESTIN FE ) 1-20 MG-MCG per tablet; Take 1 tablet by mouth Daily.  -     Discussed options including continued OCP, ablation versus hysterectomy.  " Patient to continue OCP for now.        Mukund Castro MD

## 2019-08-31 DIAGNOSIS — D25.0 INTRAMURAL AND SUBMUCOUS LEIOMYOMA OF UTERUS: ICD-10-CM

## 2019-08-31 DIAGNOSIS — D25.1 INTRAMURAL AND SUBMUCOUS LEIOMYOMA OF UTERUS: ICD-10-CM

## 2019-09-03 RX ORDER — NORETHINDRONE ACETATE/ETHINYL ESTRADIOL AND FERROUS FUMARATE 1MG-20(21)
KIT ORAL
Qty: 28 TABLET | Refills: 12 | Status: SHIPPED | OUTPATIENT
Start: 2019-09-03 | End: 2019-11-19

## 2019-10-01 ENCOUNTER — TELEPHONE (OUTPATIENT)
Dept: OBSTETRICS AND GYNECOLOGY | Facility: CLINIC | Age: 44
End: 2019-10-01

## 2019-10-01 NOTE — TELEPHONE ENCOUNTER
----- Message from Mukund Castro MD sent at 9/30/2019  6:50 PM EDT -----  Radha    Yes - I wanted her to do another ultrasound this year IF she did not proceed with surgical removal of fibroid.    Thanks    Matthew                  ----- Message -----  From: Radha Alvarado  Sent: 9/30/2019  11:16 AM  To: MD Dr Matthew Odonnell is scheduled for an ultrasound next Monday 10/7/19. I don't see anything in her chart or check out notes saying to do an ultrasound. It looks like she had an ultrasound last year and had an endometrial fibroid. Is this something you were wanting?    Thanks   Radha

## 2019-10-07 ENCOUNTER — PROCEDURE VISIT (OUTPATIENT)
Dept: OBSTETRICS AND GYNECOLOGY | Facility: CLINIC | Age: 44
End: 2019-10-07

## 2019-10-07 ENCOUNTER — TELEPHONE (OUTPATIENT)
Dept: OBSTETRICS AND GYNECOLOGY | Facility: CLINIC | Age: 44
End: 2019-10-07

## 2019-10-07 DIAGNOSIS — D25.0 INTRAMURAL AND SUBMUCOUS LEIOMYOMA OF UTERUS: Primary | ICD-10-CM

## 2019-10-07 DIAGNOSIS — D25.1 INTRAMURAL AND SUBMUCOUS LEIOMYOMA OF UTERUS: Primary | ICD-10-CM

## 2019-10-07 DIAGNOSIS — N92.0 MENORRHAGIA WITH REGULAR CYCLE: ICD-10-CM

## 2019-10-07 PROCEDURE — 76830 TRANSVAGINAL US NON-OB: CPT | Performed by: OBSTETRICS & GYNECOLOGY

## 2019-10-07 NOTE — TELEPHONE ENCOUNTER
LAW    I spoke with patient.  Discussed removal of fibroid via myosure with ablation.  Patient agreeable as outpatient.  She is aware that it may improve menses enough that she does not need to have a hysterectomy.  She agrees to proceed.     Please send her a pamphlet on hysteroscopy and Novasure ablation.    Thanks    Matthew

## 2019-11-19 ENCOUNTER — APPOINTMENT (OUTPATIENT)
Dept: PREADMISSION TESTING | Facility: HOSPITAL | Age: 44
End: 2019-11-19

## 2019-11-19 VITALS
TEMPERATURE: 97.5 F | SYSTOLIC BLOOD PRESSURE: 101 MMHG | OXYGEN SATURATION: 98 % | RESPIRATION RATE: 16 BRPM | WEIGHT: 192.25 LBS | HEIGHT: 64 IN | DIASTOLIC BLOOD PRESSURE: 69 MMHG | BODY MASS INDEX: 32.82 KG/M2 | HEART RATE: 76 BPM

## 2019-11-19 LAB
ANION GAP SERPL CALCULATED.3IONS-SCNC: 15.8 MMOL/L (ref 5–15)
BUN BLD-MCNC: 10 MG/DL (ref 6–20)
BUN/CREAT SERPL: 12.7 (ref 7–25)
CALCIUM SPEC-SCNC: 9.1 MG/DL (ref 8.6–10.5)
CHLORIDE SERPL-SCNC: 103 MMOL/L (ref 98–107)
CO2 SERPL-SCNC: 22.2 MMOL/L (ref 22–29)
CREAT BLD-MCNC: 0.79 MG/DL (ref 0.57–1)
DEPRECATED RDW RBC AUTO: 40.9 FL (ref 37–54)
ERYTHROCYTE [DISTWIDTH] IN BLOOD BY AUTOMATED COUNT: 13.2 % (ref 12.3–15.4)
GFR SERPL CREATININE-BSD FRML MDRD: 78 ML/MIN/1.73
GLUCOSE BLD-MCNC: 120 MG/DL (ref 65–99)
HCT VFR BLD AUTO: 38.1 % (ref 34–46.6)
HGB BLD-MCNC: 12.2 G/DL (ref 12–15.9)
MCH RBC QN AUTO: 27.1 PG (ref 26.6–33)
MCHC RBC AUTO-ENTMCNC: 32 G/DL (ref 31.5–35.7)
MCV RBC AUTO: 84.7 FL (ref 79–97)
PLATELET # BLD AUTO: 244 10*3/MM3 (ref 140–450)
PMV BLD AUTO: 11.8 FL (ref 6–12)
POTASSIUM BLD-SCNC: 4 MMOL/L (ref 3.5–5.2)
RBC # BLD AUTO: 4.5 10*6/MM3 (ref 3.77–5.28)
SODIUM BLD-SCNC: 141 MMOL/L (ref 136–145)
WBC NRBC COR # BLD: 7.63 10*3/MM3 (ref 3.4–10.8)

## 2019-11-19 PROCEDURE — 93010 ELECTROCARDIOGRAM REPORT: CPT | Performed by: INTERNAL MEDICINE

## 2019-11-19 PROCEDURE — 93005 ELECTROCARDIOGRAM TRACING: CPT

## 2019-11-19 PROCEDURE — 85027 COMPLETE CBC AUTOMATED: CPT | Performed by: OBSTETRICS & GYNECOLOGY

## 2019-11-19 PROCEDURE — 80048 BASIC METABOLIC PNL TOTAL CA: CPT | Performed by: OBSTETRICS & GYNECOLOGY

## 2019-11-19 PROCEDURE — 36415 COLL VENOUS BLD VENIPUNCTURE: CPT

## 2019-11-19 RX ORDER — NORETHINDRONE ACETATE AND ETHINYL ESTRADIOL 1MG-20(21)
1 KIT ORAL DAILY
COMMUNITY
End: 2019-11-29 | Stop reason: HOSPADM

## 2019-11-19 RX ORDER — DOXYCYCLINE HYCLATE 50 MG/1
50 TABLET, DELAYED RELEASE ORAL DAILY
COMMUNITY
End: 2021-06-17

## 2019-11-19 RX ORDER — AMOXICILLIN 875 MG/1
875 TABLET, COATED ORAL 2 TIMES DAILY
COMMUNITY
End: 2019-11-29 | Stop reason: HOSPADM

## 2019-11-29 ENCOUNTER — ANESTHESIA (OUTPATIENT)
Dept: PERIOP | Facility: HOSPITAL | Age: 44
End: 2019-11-29

## 2019-11-29 ENCOUNTER — ANESTHESIA EVENT (OUTPATIENT)
Dept: PERIOP | Facility: HOSPITAL | Age: 44
End: 2019-11-29

## 2019-11-29 ENCOUNTER — HOSPITAL ENCOUNTER (OUTPATIENT)
Facility: HOSPITAL | Age: 44
Setting detail: HOSPITAL OUTPATIENT SURGERY
Discharge: HOME | End: 2019-11-29
Attending: OBSTETRICS & GYNECOLOGY | Admitting: OBSTETRICS & GYNECOLOGY

## 2019-11-29 VITALS
BODY MASS INDEX: 31.92 KG/M2 | TEMPERATURE: 97.9 F | DIASTOLIC BLOOD PRESSURE: 96 MMHG | HEART RATE: 67 BPM | HEIGHT: 64 IN | SYSTOLIC BLOOD PRESSURE: 129 MMHG | WEIGHT: 186.95 LBS | RESPIRATION RATE: 16 BRPM | OXYGEN SATURATION: 99 %

## 2019-11-29 DIAGNOSIS — D25.0 INTRAMURAL AND SUBMUCOUS LEIOMYOMA OF UTERUS: ICD-10-CM

## 2019-11-29 DIAGNOSIS — N92.0 MENORRHAGIA WITH REGULAR CYCLE: ICD-10-CM

## 2019-11-29 DIAGNOSIS — D25.1 INTRAMURAL AND SUBMUCOUS LEIOMYOMA OF UTERUS: ICD-10-CM

## 2019-11-29 LAB
B-HCG UR QL: NEGATIVE
INTERNAL NEGATIVE CONTROL: NEGATIVE
INTERNAL POSITIVE CONTROL: POSITIVE
Lab: NORMAL

## 2019-11-29 PROCEDURE — 25010000002 KETOROLAC TROMETHAMINE PER 15 MG: Performed by: NURSE ANESTHETIST, CERTIFIED REGISTERED

## 2019-11-29 PROCEDURE — 58563 HYSTEROSCOPY ABLATION: CPT | Performed by: OBSTETRICS & GYNECOLOGY

## 2019-11-29 PROCEDURE — 58561 HYSTEROSCOPY REMOVE MYOMA: CPT | Performed by: OBSTETRICS & GYNECOLOGY

## 2019-11-29 PROCEDURE — 25010000002 DEXAMETHASONE PER 1 MG: Performed by: NURSE ANESTHETIST, CERTIFIED REGISTERED

## 2019-11-29 PROCEDURE — 25010000002 PROPOFOL 10 MG/ML EMULSION: Performed by: NURSE ANESTHETIST, CERTIFIED REGISTERED

## 2019-11-29 PROCEDURE — C1782 MORCELLATOR: HCPCS | Performed by: OBSTETRICS & GYNECOLOGY

## 2019-11-29 PROCEDURE — 25010000002 ONDANSETRON PER 1 MG: Performed by: NURSE ANESTHETIST, CERTIFIED REGISTERED

## 2019-11-29 PROCEDURE — 25010000002 MIDAZOLAM PER 1 MG: Performed by: ANESTHESIOLOGY

## 2019-11-29 PROCEDURE — 81025 URINE PREGNANCY TEST: CPT | Performed by: OBSTETRICS & GYNECOLOGY

## 2019-11-29 PROCEDURE — S0260 H&P FOR SURGERY: HCPCS | Performed by: OBSTETRICS & GYNECOLOGY

## 2019-11-29 PROCEDURE — 88305 TISSUE EXAM BY PATHOLOGIST: CPT | Performed by: OBSTETRICS & GYNECOLOGY

## 2019-11-29 PROCEDURE — 25010000002 FENTANYL CITRATE (PF) 100 MCG/2ML SOLUTION: Performed by: NURSE ANESTHETIST, CERTIFIED REGISTERED

## 2019-11-29 RX ORDER — PROPOFOL 10 MG/ML
VIAL (ML) INTRAVENOUS AS NEEDED
Status: DISCONTINUED | OUTPATIENT
Start: 2019-11-29 | End: 2019-11-29 | Stop reason: SURG

## 2019-11-29 RX ORDER — FLUMAZENIL 0.1 MG/ML
0.2 INJECTION INTRAVENOUS AS NEEDED
Status: DISCONTINUED | OUTPATIENT
Start: 2019-11-29 | End: 2019-11-29 | Stop reason: HOSPADM

## 2019-11-29 RX ORDER — SODIUM CHLORIDE 0.9 % (FLUSH) 0.9 %
3-10 SYRINGE (ML) INJECTION AS NEEDED
Status: DISCONTINUED | OUTPATIENT
Start: 2019-11-29 | End: 2019-11-29 | Stop reason: HOSPADM

## 2019-11-29 RX ORDER — HYDROCODONE BITARTRATE AND ACETAMINOPHEN 7.5; 325 MG/1; MG/1
1 TABLET ORAL ONCE AS NEEDED
Status: DISCONTINUED | OUTPATIENT
Start: 2019-11-29 | End: 2019-11-29 | Stop reason: HOSPADM

## 2019-11-29 RX ORDER — ACETAMINOPHEN 325 MG/1
650 TABLET ORAL ONCE AS NEEDED
Status: DISCONTINUED | OUTPATIENT
Start: 2019-11-29 | End: 2019-11-29 | Stop reason: HOSPADM

## 2019-11-29 RX ORDER — PROMETHAZINE HYDROCHLORIDE 25 MG/1
25 SUPPOSITORY RECTAL ONCE AS NEEDED
Status: DISCONTINUED | OUTPATIENT
Start: 2019-11-29 | End: 2019-11-29 | Stop reason: HOSPADM

## 2019-11-29 RX ORDER — SODIUM CHLORIDE, SODIUM LACTATE, POTASSIUM CHLORIDE, CALCIUM CHLORIDE 600; 310; 30; 20 MG/100ML; MG/100ML; MG/100ML; MG/100ML
9 INJECTION, SOLUTION INTRAVENOUS CONTINUOUS
Status: DISCONTINUED | OUTPATIENT
Start: 2019-11-29 | End: 2019-11-29 | Stop reason: HOSPADM

## 2019-11-29 RX ORDER — FENTANYL CITRATE 50 UG/ML
INJECTION, SOLUTION INTRAMUSCULAR; INTRAVENOUS AS NEEDED
Status: DISCONTINUED | OUTPATIENT
Start: 2019-11-29 | End: 2019-11-29 | Stop reason: SURG

## 2019-11-29 RX ORDER — SODIUM CHLORIDE 9 MG/ML
INJECTION, SOLUTION INTRAVENOUS AS NEEDED
Status: DISCONTINUED | OUTPATIENT
Start: 2019-11-29 | End: 2019-11-29 | Stop reason: HOSPADM

## 2019-11-29 RX ORDER — HYDROCODONE BITARTRATE AND ACETAMINOPHEN 5; 325 MG/1; MG/1
1 TABLET ORAL EVERY 6 HOURS PRN
Status: DISCONTINUED | OUTPATIENT
Start: 2019-11-29 | End: 2019-11-29 | Stop reason: HOSPADM

## 2019-11-29 RX ORDER — DIPHENHYDRAMINE HCL 25 MG
25 CAPSULE ORAL
Status: DISCONTINUED | OUTPATIENT
Start: 2019-11-29 | End: 2019-11-29 | Stop reason: HOSPADM

## 2019-11-29 RX ORDER — OXYCODONE AND ACETAMINOPHEN 7.5; 325 MG/1; MG/1
1 TABLET ORAL ONCE AS NEEDED
Status: DISCONTINUED | OUTPATIENT
Start: 2019-11-29 | End: 2019-11-29 | Stop reason: HOSPADM

## 2019-11-29 RX ORDER — MIDAZOLAM HYDROCHLORIDE 1 MG/ML
1 INJECTION INTRAMUSCULAR; INTRAVENOUS
Status: DISCONTINUED | OUTPATIENT
Start: 2019-11-29 | End: 2019-11-29 | Stop reason: HOSPADM

## 2019-11-29 RX ORDER — DEXAMETHASONE SODIUM PHOSPHATE 10 MG/ML
INJECTION INTRAMUSCULAR; INTRAVENOUS AS NEEDED
Status: DISCONTINUED | OUTPATIENT
Start: 2019-11-29 | End: 2019-11-29 | Stop reason: SURG

## 2019-11-29 RX ORDER — LIDOCAINE HYDROCHLORIDE 10 MG/ML
0.5 INJECTION, SOLUTION EPIDURAL; INFILTRATION; INTRACAUDAL; PERINEURAL ONCE AS NEEDED
Status: DISCONTINUED | OUTPATIENT
Start: 2019-11-29 | End: 2019-11-29 | Stop reason: HOSPADM

## 2019-11-29 RX ORDER — NALOXONE HCL 0.4 MG/ML
0.2 VIAL (ML) INJECTION AS NEEDED
Status: DISCONTINUED | OUTPATIENT
Start: 2019-11-29 | End: 2019-11-29 | Stop reason: HOSPADM

## 2019-11-29 RX ORDER — SODIUM CHLORIDE 0.9 % (FLUSH) 0.9 %
3 SYRINGE (ML) INJECTION EVERY 12 HOURS SCHEDULED
Status: DISCONTINUED | OUTPATIENT
Start: 2019-11-29 | End: 2019-11-29 | Stop reason: HOSPADM

## 2019-11-29 RX ORDER — EPHEDRINE SULFATE 50 MG/ML
5 INJECTION, SOLUTION INTRAVENOUS ONCE AS NEEDED
Status: DISCONTINUED | OUTPATIENT
Start: 2019-11-29 | End: 2019-11-29 | Stop reason: HOSPADM

## 2019-11-29 RX ORDER — FAMOTIDINE 20 MG/1
20 TABLET, FILM COATED ORAL
Status: COMPLETED | OUTPATIENT
Start: 2019-11-29 | End: 2019-11-29

## 2019-11-29 RX ORDER — FENTANYL CITRATE 50 UG/ML
50 INJECTION, SOLUTION INTRAMUSCULAR; INTRAVENOUS
Status: DISCONTINUED | OUTPATIENT
Start: 2019-11-29 | End: 2019-11-29 | Stop reason: HOSPADM

## 2019-11-29 RX ORDER — PROMETHAZINE HYDROCHLORIDE 25 MG/ML
12.5 INJECTION, SOLUTION INTRAMUSCULAR; INTRAVENOUS ONCE AS NEEDED
Status: DISCONTINUED | OUTPATIENT
Start: 2019-11-29 | End: 2019-11-29 | Stop reason: HOSPADM

## 2019-11-29 RX ORDER — HYDRALAZINE HYDROCHLORIDE 20 MG/ML
5 INJECTION INTRAMUSCULAR; INTRAVENOUS
Status: DISCONTINUED | OUTPATIENT
Start: 2019-11-29 | End: 2019-11-29 | Stop reason: HOSPADM

## 2019-11-29 RX ORDER — DIPHENHYDRAMINE HYDROCHLORIDE 50 MG/ML
12.5 INJECTION INTRAMUSCULAR; INTRAVENOUS
Status: DISCONTINUED | OUTPATIENT
Start: 2019-11-29 | End: 2019-11-29 | Stop reason: HOSPADM

## 2019-11-29 RX ORDER — KETOROLAC TROMETHAMINE 30 MG/ML
INJECTION, SOLUTION INTRAMUSCULAR; INTRAVENOUS AS NEEDED
Status: DISCONTINUED | OUTPATIENT
Start: 2019-11-29 | End: 2019-11-29 | Stop reason: SURG

## 2019-11-29 RX ORDER — MAGNESIUM HYDROXIDE 1200 MG/15ML
LIQUID ORAL AS NEEDED
Status: DISCONTINUED | OUTPATIENT
Start: 2019-11-29 | End: 2019-11-29 | Stop reason: HOSPADM

## 2019-11-29 RX ORDER — ONDANSETRON 2 MG/ML
INJECTION INTRAMUSCULAR; INTRAVENOUS AS NEEDED
Status: DISCONTINUED | OUTPATIENT
Start: 2019-11-29 | End: 2019-11-29 | Stop reason: SURG

## 2019-11-29 RX ORDER — LABETALOL HYDROCHLORIDE 5 MG/ML
5 INJECTION, SOLUTION INTRAVENOUS
Status: DISCONTINUED | OUTPATIENT
Start: 2019-11-29 | End: 2019-11-29 | Stop reason: HOSPADM

## 2019-11-29 RX ORDER — HYDROCODONE BITARTRATE AND ACETAMINOPHEN 5; 325 MG/1; MG/1
1 TABLET ORAL EVERY 6 HOURS PRN
Qty: 8 TABLET | Refills: 0 | Status: SHIPPED | OUTPATIENT
Start: 2019-11-29 | End: 2019-12-09

## 2019-11-29 RX ORDER — LIDOCAINE HYDROCHLORIDE 20 MG/ML
INJECTION, SOLUTION INFILTRATION; PERINEURAL AS NEEDED
Status: DISCONTINUED | OUTPATIENT
Start: 2019-11-29 | End: 2019-11-29 | Stop reason: SURG

## 2019-11-29 RX ORDER — FAMOTIDINE 10 MG/ML
20 INJECTION, SOLUTION INTRAVENOUS
Status: COMPLETED | OUTPATIENT
Start: 2019-11-29 | End: 2019-11-29

## 2019-11-29 RX ORDER — PROMETHAZINE HYDROCHLORIDE 25 MG/ML
6.25 INJECTION, SOLUTION INTRAMUSCULAR; INTRAVENOUS
Status: DISCONTINUED | OUTPATIENT
Start: 2019-11-29 | End: 2019-11-29 | Stop reason: HOSPADM

## 2019-11-29 RX ORDER — MIDAZOLAM HYDROCHLORIDE 1 MG/ML
2 INJECTION INTRAMUSCULAR; INTRAVENOUS
Status: DISCONTINUED | OUTPATIENT
Start: 2019-11-29 | End: 2019-11-29 | Stop reason: HOSPADM

## 2019-11-29 RX ORDER — ONDANSETRON 2 MG/ML
4 INJECTION INTRAMUSCULAR; INTRAVENOUS ONCE AS NEEDED
Status: DISCONTINUED | OUTPATIENT
Start: 2019-11-29 | End: 2019-11-29 | Stop reason: HOSPADM

## 2019-11-29 RX ORDER — PROMETHAZINE HYDROCHLORIDE 25 MG/1
25 TABLET ORAL ONCE AS NEEDED
Status: DISCONTINUED | OUTPATIENT
Start: 2019-11-29 | End: 2019-11-29 | Stop reason: HOSPADM

## 2019-11-29 RX ADMIN — FENTANYL CITRATE 25 MCG: 50 INJECTION INTRAMUSCULAR; INTRAVENOUS at 10:31

## 2019-11-29 RX ADMIN — LIDOCAINE HYDROCHLORIDE 100 MG: 20 INJECTION, SOLUTION INFILTRATION; PERINEURAL at 10:01

## 2019-11-29 RX ADMIN — KETOROLAC TROMETHAMINE 30 MG: 30 INJECTION, SOLUTION INTRAMUSCULAR; INTRAVENOUS at 10:08

## 2019-11-29 RX ADMIN — PROPOFOL 200 MG: 10 INJECTION, EMULSION INTRAVENOUS at 10:01

## 2019-11-29 RX ADMIN — FENTANYL CITRATE 50 MCG: 50 INJECTION INTRAMUSCULAR; INTRAVENOUS at 10:07

## 2019-11-29 RX ADMIN — DEXAMETHASONE SODIUM PHOSPHATE 8 MG: 10 INJECTION INTRAMUSCULAR; INTRAVENOUS at 10:08

## 2019-11-29 RX ADMIN — MIDAZOLAM 1 MG: 1 INJECTION INTRAMUSCULAR; INTRAVENOUS at 09:44

## 2019-11-29 RX ADMIN — ONDANSETRON 4 MG: 2 INJECTION INTRAMUSCULAR; INTRAVENOUS at 10:08

## 2019-11-29 RX ADMIN — HYDROCODONE BITARTRATE AND ACETAMINOPHEN 1 TABLET: 5; 325 TABLET ORAL at 11:48

## 2019-11-29 RX ADMIN — SODIUM CHLORIDE, POTASSIUM CHLORIDE, SODIUM LACTATE AND CALCIUM CHLORIDE 9 ML/HR: 600; 310; 30; 20 INJECTION, SOLUTION INTRAVENOUS at 09:07

## 2019-11-29 RX ADMIN — FENTANYL CITRATE 25 MCG: 50 INJECTION INTRAMUSCULAR; INTRAVENOUS at 10:19

## 2019-11-29 RX ADMIN — FAMOTIDINE 20 MG: 10 INJECTION INTRAVENOUS at 09:08

## 2019-11-29 NOTE — ANESTHESIA PREPROCEDURE EVALUATION
Anesthesia Evaluation     NPO Solid Status: > 8 hours             Airway   Mallampati: III  TM distance: >3 FB  Neck ROM: full  Possible difficult intubation  Dental    (+) upper dentures    Comment: Some lower teeth loose      Pulmonary - normal exam   (+) a smoker Former, shortness of breath,   Cardiovascular   Exercise tolerance: good (4-7 METS)    Rhythm: regular    (+) hypertension,   (-) murmur      Neuro/Psych  GI/Hepatic/Renal/Endo    (+) obesity,       Musculoskeletal     Abdominal  - normal exam   Substance History      OB/GYN          Other                        Anesthesia Plan    ASA 2     general   (  D/W R&B of GA including but not limited to: heart, lung, liver, kidney, neurologic problems, positioning injuries, dental damage, corneal abrasion and TMJ.  .    Lower teeth loose, she understands they are at risk for damage or removal during anesthestic)  intravenous induction

## 2019-11-29 NOTE — ANESTHESIA PROCEDURE NOTES
Airway  Urgency: elective    Date/Time: 11/29/2019 10:02 AM  Airway not difficult    General Information and Staff    Patient location during procedure: OR  Anesthesiologist: Nicol, Chase Carrington MD  CRNA: Rogelio Navarro CRNA    Indications and Patient Condition  Indications for airway management: airway protection    Preoxygenated: yes  Mask difficulty assessment: 1 - vent by mask    Final Airway Details  Final airway type: supraglottic airway      Successful airway: unique  Size 4    Number of attempts at approach: 1  Assessment: lips, teeth, and gum same as pre-op and atraumatic intubation    Additional Comments  Pre 02 100%, SIVI, atraumatic intubation, BLBS, Positive ETC02.

## 2019-11-29 NOTE — ANESTHESIA POSTPROCEDURE EVALUATION
"Patient: Maria Ines Garcia    Procedure Summary     Date:  11/29/19 Room / Location:  [facility] OR  / [facility] MAIN OR    Anesthesia Start:  0956 Anesthesia Stop:  1048    Procedures:       DILATATION AND CURETTAGE HYSTEROSCOPY WITH NOVASURE ENDOMETRIAL ABLATION (N/A Vagina)      AND MYOSURE (N/A Uterus) Diagnosis:       Intramural and submucous leiomyoma of uterus      Menorrhagia with regular cycle      (Intramural and submucous leiomyoma of uterus [D25.1, D25.0])      (Menorrhagia with regular cycle [N92.0])    Surgeon:  Mukund Castro MD Provider:  Chase Camarena MD    Anesthesia Type:  general ASA Status:  2          Anesthesia Type: general  Last vitals  BP   129/96 (11/29/19 1155)   Temp   36.6 °C (97.9 °F) (11/29/19 1130)   Pulse   67 (11/29/19 1210)   Resp   16 (11/29/19 1210)     SpO2   99 % (11/29/19 1210)     Post Anesthesia Care and Evaluation    Patient participation: complete - patient participated  Level of consciousness: awake  Pain management: adequate  Airway patency: patent  Anesthetic complications: No anesthetic complications    Cardiovascular status: acceptable  Respiratory status: acceptable  Hydration status: acceptable    Comments: /96   Pulse 67   Temp 36.6 °C (97.9 °F) (Oral)   Resp 16   Ht 162.6 cm (64\")   Wt 84.8 kg (186 lb 15.2 oz)   LMP 11/29/2019   SpO2 99%   BMI 32.09 kg/m²         "

## 2019-11-30 LAB
CYTO UR: NORMAL
LAB AP CASE REPORT: NORMAL
PATH REPORT.FINAL DX SPEC: NORMAL
PATH REPORT.GROSS SPEC: NORMAL

## 2019-12-16 ENCOUNTER — OFFICE VISIT (OUTPATIENT)
Dept: OBSTETRICS AND GYNECOLOGY | Facility: CLINIC | Age: 44
End: 2019-12-16

## 2019-12-16 VITALS
DIASTOLIC BLOOD PRESSURE: 74 MMHG | SYSTOLIC BLOOD PRESSURE: 122 MMHG | WEIGHT: 187 LBS | BODY MASS INDEX: 31.92 KG/M2 | HEIGHT: 64 IN

## 2019-12-16 DIAGNOSIS — D25.0 INTRAMURAL AND SUBMUCOUS LEIOMYOMA OF UTERUS: Primary | ICD-10-CM

## 2019-12-16 DIAGNOSIS — D25.1 INTRAMURAL AND SUBMUCOUS LEIOMYOMA OF UTERUS: Primary | ICD-10-CM

## 2019-12-16 PROCEDURE — 99212 OFFICE O/P EST SF 10 MIN: CPT | Performed by: OBSTETRICS & GYNECOLOGY

## 2019-12-16 NOTE — PROGRESS NOTES
Subjective   Maria Ines Garcia is a 44 y.o. female.     Cc:  Fibroid    History of Present Illness - 44 year old female who underwent hysteroscopic fibroid removal with endometrial ablation.  She is doing well postoperative.  Her bleeding is light and she is having a watery discharge.  No abdominal pain.    The following portions of the patient's history were reviewed and updated as appropriate:   She  has a past medical history of Heavy periods, History of hepatitis B (1990), Hypertension, Rosacea, Seasonal allergies, and Uterine fibroid.  Her family history includes Arrhythmia in her maternal grandmother; COPD in her father; Emphysema in her father; Heart disease in her mother and paternal uncle; Hypertension in her mother.  She  reports that she quit smoking about 10 years ago. Her smoking use included cigarettes. She has a 31.50 pack-year smoking history. She has never used smokeless tobacco. She reports that she drinks alcohol. She reports that she does not use drugs.  Current Outpatient Medications   Medication Sig Dispense Refill   • aspirin 81 MG chewable tablet Chew 81 mg Daily.     • bisoprolol-hydrochlorothiazide (ZIAC) 5-6.25 MG per tablet Take 1 tablet by mouth Daily. (Patient taking differently: Take 1 tablet by mouth Every Morning.) 90 tablet 4   • Doxycycline Hyclate 50 MG tablet delayed-release enteric coated tablet Take 50 mg by mouth Daily.     • Fexofenadine HCl (ALLERGY 24-HR PO) Take 1 tablet by mouth Every Morning.     • GARLIC PO Take 500 mg by mouth Daily.       No current facility-administered medications for this visit.      She has No Known Allergies..    Review of Systems   Constitutional: Negative for chills and fever.   Gastrointestinal: Negative for nausea and vomiting.   Genitourinary: Negative for dysuria and frequency.       Objective   Physical Exam   Constitutional: She appears well-developed and well-nourished.   Abdominal: Soft. There is no rebound and no guarding.   Genitourinary:  Vagina normal and uterus normal. Pelvic exam was performed with patient supine. There is no tenderness or lesion on the right labia. There is no tenderness or lesion on the left labia. Cervix exhibits no motion tenderness, no discharge and no friability. Right adnexum displays no mass and no tenderness. Left adnexum displays no mass and no tenderness.   Psychiatric: She has a normal mood and affect. Her behavior is normal. Judgment and thought content normal.       Assessment/Plan   Maria Ines was seen today for post-op.    Diagnoses and all orders for this visit:    Intramural and submucous leiomyoma of uterus  -  Discussed pathology and surgical findings.  Reassurance given.  If any further bleeding occurs, patient to notify me.  Otherwise, will see patient in one year.    Mukund Castro MD

## 2020-03-18 RX ORDER — BISOPROLOL FUMARATE AND HYDROCHLOROTHIAZIDE 5; 6.25 MG/1; MG/1
1 TABLET ORAL DAILY
Qty: 90 TABLET | Refills: 0 | Status: SHIPPED | OUTPATIENT
Start: 2020-03-18 | End: 2020-08-27 | Stop reason: SDUPTHER

## 2020-03-20 ENCOUNTER — APPOINTMENT (OUTPATIENT)
Dept: CARDIOLOGY | Facility: HOSPITAL | Age: 45
End: 2020-03-20

## 2020-05-12 NOTE — PROGRESS NOTES
Subjective:        Maria Ines Garcia is a 44 y.o. female who here for follow up    No chief complaint on file.    One year follow up for hypertension    SUSANNA Garcia is a 44-year-old female, who is new to this provider.  She has history of hepatitis B, hypertension, [place], and rosacea.  Stress test on 3/28/2018 which indicated a normal myocardial perfusion study with no evidence of ischemia.  Her echo on 3/5/2018 revealed EF 65.5%, normal global strain -21.9, trace to mild MV regurgitation is present, and no evidence of pericardial effusion.  Lipid profile noted in 2018 which revealed , HDL 47, , and triglycerides 166.    She denies chest pain, shortness of breath, syncope and near syncope.    The following portions of the patient's history were reviewed and updated as appropriate: allergies, current medications, past family history, past medical history, past social history, past surgical history and problem list.    Past Medical History:   Diagnosis Date   • Heavy periods    • History of hepatitis B 1990   • Hypertension    • Rosacea    • Seasonal allergies    • Uterine fibroid          reports that she quit smoking about 11 years ago. Her smoking use included cigarettes. She has a 31.50 pack-year smoking history. She has never used smokeless tobacco. She reports that she drinks alcohol. She reports that she does not use drugs.     Family History   Problem Relation Age of Onset   • Heart disease Mother    • Hypertension Mother    • Heart disease Paternal Uncle    • Arrhythmia Maternal Grandmother    • Emphysema Father    • COPD Father    • Malig Hyperthermia Neg Hx        ROS     Review of Systems  Constitutional: Negative for wt loss, fever, fatigue  Gastrointestinal: Negative for nausea, abdominal pain  Behavioral/Psych: Negative for insomnia or anxiety  Cardiovascular: Denies chest pain, palpitations, shortness of breath, syncope and near syncope.         Objective:           Physical Exam    Constitutional: She is oriented to person, place, and time. She appears well-developed and well-nourished.   HENT:   Head: Normocephalic.   Right Ear: External ear normal.   Left Ear: External ear normal.   Eyes: EOM are normal.   Neck: Normal range of motion. No JVD present.   Cardiovascular: Normal rate, regular rhythm, normal heart sounds and intact distal pulses. Exam reveals no gallop and no friction rub.   No murmur heard.  Pulmonary/Chest: Effort normal and breath sounds normal. No stridor. No respiratory distress. She has no rales.   Abdominal: Soft. Bowel sounds are normal. She exhibits no distension. There is no tenderness. There is no guarding.   Musculoskeletal: Normal range of motion. She exhibits no edema or tenderness.   Neurological: She is alert and oriented to person, place, and time. She has normal reflexes.   Skin: Skin is warm.   Psychiatric: She has a normal mood and affect. Judgment normal.   Nursing note and vitals reviewed.        ECG 12 Lead  Date/Time: 5/15/2020 10:09 AM  Performed by: Rhea Taveras APRN  Authorized by: Rhea Taveras APRN   Comparison: compared with previous ECG from 11/19/2019  Similar to previous ECG  Rhythm: sinus rhythm               Interpretation Summary        · Findings consistent with a normal ECG stress test.  · Left ventricular ejection fraction is hyperdynamic (Calculated EF > 70%).  · Myocardial perfusion imaging indicates a normal myocardial perfusion study with no evidence of ischemia.  · Impressions are consistent with a low risk study.        Interpretation Summary     · Normal global strain -21.9  · Left Ventricle: Calculated EF = 65.5%  · There is no evidence of pericardial effusion.  · Trace-to-mild mitral valve regurgitation is present.         Current Outpatient Medications:   •  aspirin 81 MG chewable tablet, Chew 81 mg Daily., Disp: , Rfl:   •  bisoprolol-hydrochlorothiazide (ZIAC) 5-6.25 MG per tablet, Take 1 tablet by mouth Daily.,  Disp: 90 tablet, Rfl: 0  •  Doxycycline Hyclate 50 MG tablet delayed-release enteric coated tablet, Take 50 mg by mouth Daily., Disp: , Rfl:   •  Fexofenadine HCl (ALLERGY 24-HR PO), Take 1 tablet by mouth Every Morning., Disp: , Rfl:   •  GARLIC PO, Take 500 mg by mouth Daily., Disp: , Rfl:      Assessment:        Patient Active Problem List   Diagnosis   • Class 1 obesity due to excess calories without serious comorbidity with body mass index (BMI) of 30.0 to 30.9 in adult   • Borderline systolic HTN   • Chest pain   • SOB (shortness of breath)   • Intramural and submucous leiomyoma of uterus   • Menorrhagia with regular cycle               Plan:   1.  Essential hypertension: She states her blood pressure has been controlled on current medications.    Importance of controlling hypertension and blood pressure  checkup on the regular basis has been explained. Hypertension as a silent killer has been discussed. Risk reduction of the weight and regular exercises to control the hypertension has been explained.    2.  Obesity: 32.8    Significant risk of obesity to CAD, HTN has been explained. advantages of wt reduction has been explained      No diagnosis found.    There are no diagnoses linked to this encounter.    COUNSELING:    Maria Ines Angel was given to patient for the following topics: diagnostic results, risk factor reductions, impressions, risks and benefits of treatment options and importance of treatment compliance .       SMOKING COUNSELING:    Follow-up in one year with cmp and lipid profile, unless she needs to be seen sooner.    Sincerely,   PEBBLES Tinsley  [facility]  05/15/20  1002    EMR Dragon/Transcription disclaimer:   Much of this encounter note is an electronic transcription/translation of spoken language to printed text. The electronic translation of spoken language may permit erroneous, or at times, nonsensical words or phrases to be inadvertently transcribed;  Although I have reviewed the note for such errors, some may still exist.

## 2020-05-15 ENCOUNTER — HOSPITAL ENCOUNTER (OUTPATIENT)
Dept: CARDIOLOGY | Facility: HOSPITAL | Age: 45
Discharge: HOME | End: 2020-05-15
Admitting: INTERNAL MEDICINE

## 2020-05-15 ENCOUNTER — APPOINTMENT (OUTPATIENT)
Dept: CARDIOLOGY | Facility: HOSPITAL | Age: 45
End: 2020-05-15

## 2020-05-15 ENCOUNTER — OFFICE VISIT (OUTPATIENT)
Dept: CARDIOLOGY | Facility: CLINIC | Age: 45
End: 2020-05-15

## 2020-05-15 VITALS
HEIGHT: 64 IN | BODY MASS INDEX: 32.61 KG/M2 | WEIGHT: 191 LBS | DIASTOLIC BLOOD PRESSURE: 71 MMHG | HEART RATE: 56 BPM | SYSTOLIC BLOOD PRESSURE: 106 MMHG

## 2020-05-15 DIAGNOSIS — I10 ESSENTIAL HYPERTENSION: Primary | ICD-10-CM

## 2020-05-15 DIAGNOSIS — R07.9 CHEST PAIN, UNSPECIFIED TYPE: ICD-10-CM

## 2020-05-15 DIAGNOSIS — R03.0 BORDERLINE SYSTOLIC HTN: ICD-10-CM

## 2020-05-15 DIAGNOSIS — E66.09 CLASS 1 OBESITY DUE TO EXCESS CALORIES WITHOUT SERIOUS COMORBIDITY WITH BODY MASS INDEX (BMI) OF 30.0 TO 30.9 IN ADULT: ICD-10-CM

## 2020-05-15 DIAGNOSIS — R06.02 SOB (SHORTNESS OF BREATH): ICD-10-CM

## 2020-05-15 DIAGNOSIS — I10 ESSENTIAL HYPERTENSION: ICD-10-CM

## 2020-05-15 LAB
ALBUMIN SERPL-MCNC: 4.3 G/DL (ref 3.5–5.2)
ALBUMIN/GLOB SERPL: 1.8 G/DL
ALP SERPL-CCNC: 69 U/L (ref 39–117)
ALT SERPL W P-5'-P-CCNC: 11 U/L (ref 1–33)
ANION GAP SERPL CALCULATED.3IONS-SCNC: 12.6 MMOL/L (ref 5–15)
AST SERPL-CCNC: 16 U/L (ref 1–32)
BILIRUB SERPL-MCNC: 0.4 MG/DL (ref 0.2–1.2)
BUN BLD-MCNC: 10 MG/DL (ref 6–20)
BUN/CREAT SERPL: 14.7 (ref 7–25)
CALCIUM SPEC-SCNC: 9.2 MG/DL (ref 8.6–10.5)
CHLORIDE SERPL-SCNC: 103 MMOL/L (ref 98–107)
CHOLEST SERPL-MCNC: 198 MG/DL (ref 0–200)
CO2 SERPL-SCNC: 23.4 MMOL/L (ref 22–29)
CREAT BLD-MCNC: 0.68 MG/DL (ref 0.57–1)
GFR SERPL CREATININE-BSD FRML MDRD: 93 ML/MIN/1.73
GLOBULIN UR ELPH-MCNC: 2.4 GM/DL
GLUCOSE BLD-MCNC: 104 MG/DL (ref 65–99)
HDLC SERPL-MCNC: 47 MG/DL (ref 40–60)
LDLC SERPL CALC-MCNC: 122 MG/DL (ref 0–100)
LDLC/HDLC SERPL: 2.6 {RATIO}
POTASSIUM BLD-SCNC: 4.3 MMOL/L (ref 3.5–5.2)
PROT SERPL-MCNC: 6.7 G/DL (ref 6–8.5)
SODIUM BLD-SCNC: 139 MMOL/L (ref 136–145)
TRIGL SERPL-MCNC: 144 MG/DL (ref 0–150)
VLDLC SERPL-MCNC: 28.8 MG/DL (ref 5–40)

## 2020-05-15 PROCEDURE — 80061 LIPID PANEL: CPT | Performed by: INTERNAL MEDICINE

## 2020-05-15 PROCEDURE — 80053 COMPREHEN METABOLIC PANEL: CPT | Performed by: INTERNAL MEDICINE

## 2020-05-15 PROCEDURE — 99212 OFFICE O/P EST SF 10 MIN: CPT | Performed by: NURSE PRACTITIONER

## 2020-05-15 PROCEDURE — 36415 COLL VENOUS BLD VENIPUNCTURE: CPT | Performed by: INTERNAL MEDICINE

## 2020-05-15 PROCEDURE — 93000 ELECTROCARDIOGRAM COMPLETE: CPT | Performed by: NURSE PRACTITIONER

## 2020-08-07 ENCOUNTER — HOSPITAL ENCOUNTER (OUTPATIENT)
Dept: GENERAL RADIOLOGY | Facility: HOSPITAL | Age: 45
Discharge: HOME | End: 2020-08-07
Attending: FAMILY MEDICINE

## 2020-08-27 RX ORDER — BISOPROLOL FUMARATE AND HYDROCHLOROTHIAZIDE 5; 6.25 MG/1; MG/1
1 TABLET ORAL DAILY
Qty: 90 TABLET | Refills: 0 | Status: SHIPPED | OUTPATIENT
Start: 2020-08-27 | End: 2020-12-21

## 2020-12-21 RX ORDER — BISOPROLOL FUMARATE AND HYDROCHLOROTHIAZIDE 5; 6.25 MG/1; MG/1
TABLET ORAL
Qty: 90 TABLET | Refills: 1 | Status: SHIPPED | OUTPATIENT
Start: 2020-12-21 | End: 2021-06-17 | Stop reason: ALTCHOICE

## 2021-04-23 DIAGNOSIS — E66.09 CLASS 1 OBESITY DUE TO EXCESS CALORIES WITHOUT SERIOUS COMORBIDITY WITH BODY MASS INDEX (BMI) OF 30.0 TO 30.9 IN ADULT: ICD-10-CM

## 2021-04-23 DIAGNOSIS — R03.0 BORDERLINE SYSTOLIC HTN: Primary | ICD-10-CM

## 2021-04-29 ENCOUNTER — APPOINTMENT (OUTPATIENT)
Dept: WOMENS IMAGING | Facility: HOSPITAL | Age: 46
End: 2021-04-29

## 2021-04-29 ENCOUNTER — PROCEDURE VISIT (OUTPATIENT)
Dept: OBSTETRICS AND GYNECOLOGY | Facility: CLINIC | Age: 46
End: 2021-04-29

## 2021-04-29 ENCOUNTER — OFFICE VISIT (OUTPATIENT)
Dept: OBSTETRICS AND GYNECOLOGY | Facility: CLINIC | Age: 46
End: 2021-04-29

## 2021-04-29 VITALS
WEIGHT: 190 LBS | DIASTOLIC BLOOD PRESSURE: 70 MMHG | HEIGHT: 64 IN | SYSTOLIC BLOOD PRESSURE: 102 MMHG | BODY MASS INDEX: 32.44 KG/M2

## 2021-04-29 DIAGNOSIS — Z12.31 VISIT FOR SCREENING MAMMOGRAM: Primary | ICD-10-CM

## 2021-04-29 DIAGNOSIS — F32.81 PREMENSTRUAL DYSPHORIC DISORDER: ICD-10-CM

## 2021-04-29 DIAGNOSIS — Z01.419 VISIT FOR GYNECOLOGIC EXAMINATION: Primary | ICD-10-CM

## 2021-04-29 PROCEDURE — 77067 SCR MAMMO BI INCL CAD: CPT | Performed by: OBSTETRICS & GYNECOLOGY

## 2021-04-29 PROCEDURE — 99396 PREV VISIT EST AGE 40-64: CPT | Performed by: OBSTETRICS & GYNECOLOGY

## 2021-04-29 PROCEDURE — 77067 SCR MAMMO BI INCL CAD: CPT | Performed by: RADIOLOGY

## 2021-04-29 PROCEDURE — 77063 BREAST TOMOSYNTHESIS BI: CPT | Performed by: OBSTETRICS & GYNECOLOGY

## 2021-04-29 PROCEDURE — 77063 BREAST TOMOSYNTHESIS BI: CPT | Performed by: RADIOLOGY

## 2021-04-29 PROCEDURE — 99213 OFFICE O/P EST LOW 20 MIN: CPT | Performed by: OBSTETRICS & GYNECOLOGY

## 2021-04-29 RX ORDER — BUPROPION HYDROCHLORIDE 150 MG/1
150 TABLET ORAL EVERY MORNING
Qty: 30 TABLET | Refills: 11 | Status: SHIPPED | OUTPATIENT
Start: 2021-04-29 | End: 2022-05-03

## 2021-04-29 NOTE — PROGRESS NOTES
[facility]  [address], [address]  [place], [place] [address]  Phone: [phone] / Fax:  [phone]      2021    [address] [place] [address]    Geovanni Rose MD    Chief Complaint   Patient presents with   • Gynecologic Exam     Annual Exam, last pap 7-10-18 NL HPV (-), Mammogram today. Patient c/o insomnia and anxiety 1 week prior to her cycle.       Maria Ines Garcia is here for annual gynecologic exam.  HPI - Patient with last normal pap in 2018.  Her last mammogram was 2 years ago and was normal.  She underwent mammography screening today.  Patient had ablation in past.  Her cycles are light and regular; however, she has insomnia and anxiety for 1 week prior to cycle.      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   • D & C HYSTEROSCOPY N/A 2019    Procedure: AND MYOSURE;  Surgeon: Mukund Castro MD;  Location: [facility];  Service: Obstetrics/Gynecology   • D & C HYSTEROSCOPY ENDOMETRIAL ABLATION N/A 2019    Procedure: DILATATION AND CURETTAGE HYSTEROSCOPY WITH NOVASURE ENDOMETRIAL ABLATION;  Surgeon: Mukund Castro MD;  Location: [facility];  Service: Obstetrics/Gynecology   • TONSILLECTOMY AND ADENOIDECTOMY     • TUBAL ABDOMINAL LIGATION Bilateral        No Known Allergies    Social History     Socioeconomic History   • Marital status:      Spouse name: Not on file   • Number of children: Not on file   • Years of education: Not on file   • Highest education level: Not on file   Tobacco Use   • Smoking status: Former  "Smoker     Packs/day: 1.50     Years: 21.00     Pack years: 31.50     Types: Cigarettes     Quit date:      Years since quittin.3   • Smokeless tobacco: Never Used   Substance and Sexual Activity   • Alcohol use: Yes     Comment: once per week or less   • Drug use: No   • Sexual activity: Yes     Birth control/protection: Surgical       Family History   Problem Relation Age of Onset   • Heart disease Mother    • Hypertension Mother    • Heart disease Paternal Uncle    • Arrhythmia Maternal Grandmother    • Emphysema Father    • COPD Father    • Malig Hyperthermia Neg Hx        Patient's last menstrual period was 2021 (exact date).    OB History        3    Para        Term                AB   1    Living           SAB   1    TAB        Ectopic        Molar        Multiple        Live Births   2                Vitals:    21 0948   BP: 102/70   Weight: 86.2 kg (190 lb)   Height: 162.6 cm (64.02\")       Physical Exam  Constitutional:       Appearance: She is well-developed.   Genitourinary:      Pelvic exam was performed with patient in the lithotomy position.      Urethra, bladder, vagina and uterus normal.      No cervical motion tenderness or lesion.      Uterus is mobile.      Right adnexa not tender or full.      Left adnexa not tender or full.   HENT:      Right Ear: External ear normal.      Left Ear: External ear normal.      Nose: Nose normal.   Eyes:      Conjunctiva/sclera: Conjunctivae normal.   Neck:      Thyroid: No thyromegaly.   Cardiovascular:      Rate and Rhythm: Normal rate and regular rhythm.      Heart sounds: Normal heart sounds.   Pulmonary:      Effort: Pulmonary effort is normal.      Breath sounds: No stridor. No wheezing.   Chest:      Breasts:         Right: No mass or nipple discharge.         Left: No mass or nipple discharge.   Abdominal:      Palpations: Abdomen is soft. There is no mass.      Tenderness: There is no guarding or rebound. "   Musculoskeletal:         General: Normal range of motion.      Cervical back: Normal range of motion and neck supple.   Neurological:      Mental Status: She is alert.      Coordination: Coordination normal.   Skin:     General: Skin is warm and dry.   Psychiatric:         Mood and Affect: Mood normal.         Behavior: Behavior normal.         Thought Content: Thought content normal.         Judgment: Judgment normal.   Vitals reviewed.         Diagnoses and all orders for this visit:    1. Visit for gynecologic examination (Primary)  -     IgP, Aptima HPV  -     Discussed importance of regular screening and breast awareness.  -     Some hypermobility of uterus and cystocele.  Symptoms mild.  Observation for now.    2. Premenstrual dysphoric disorder  -     buPROPion XL (Wellbutrin XL) 150 MG 24 hr tablet; Take 1 tablet by mouth Every Morning.  Dispense: 30 tablet; Refill: 11  -     Discussed management.  She specifically requested trial of Wellbutrin over Prozac.          Mukund Castro MD

## 2021-05-03 DIAGNOSIS — N64.89 BREAST ASYMMETRY: ICD-10-CM

## 2021-05-03 DIAGNOSIS — R92.8 ABNORMALITY OF LEFT BREAST ON SCREENING MAMMOGRAM: Primary | ICD-10-CM

## 2021-05-03 LAB
CYTOLOGIST CVX/VAG CYTO: NORMAL
CYTOLOGY CVX/VAG DOC CYTO: NORMAL
CYTOLOGY CVX/VAG DOC THIN PREP: NORMAL
DX ICD CODE: NORMAL
HIV 1 & 2 AB SER-IMP: NORMAL
HPV I/H RISK 4 DNA CVX QL PROBE+SIG AMP: NEGATIVE
OTHER STN SPEC: NORMAL
STAT OF ADQ CVX/VAG CYTO-IMP: NORMAL

## 2021-05-20 ENCOUNTER — TELEPHONE (OUTPATIENT)
Dept: OBSTETRICS AND GYNECOLOGY | Facility: CLINIC | Age: 46
End: 2021-05-20

## 2021-05-27 ENCOUNTER — APPOINTMENT (OUTPATIENT)
Dept: WOMENS IMAGING | Facility: HOSPITAL | Age: 46
End: 2021-05-27

## 2021-05-27 PROCEDURE — G0279 TOMOSYNTHESIS, MAMMO: HCPCS | Performed by: RADIOLOGY

## 2021-05-27 PROCEDURE — 77061 BREAST TOMOSYNTHESIS UNI: CPT | Performed by: RADIOLOGY

## 2021-05-27 PROCEDURE — 76641 ULTRASOUND BREAST COMPLETE: CPT | Performed by: RADIOLOGY

## 2021-05-27 PROCEDURE — 77065 DX MAMMO INCL CAD UNI: CPT | Performed by: RADIOLOGY

## 2021-06-01 DIAGNOSIS — R92.8 ABNORMAL MAMMOGRAM OF LEFT BREAST: Primary | ICD-10-CM

## 2021-06-01 DIAGNOSIS — R92.8 ABNORMALITY OF LEFT BREAST ON SCREENING MAMMOGRAM: ICD-10-CM

## 2021-06-01 DIAGNOSIS — N63.20 BREAST MASS, LEFT: ICD-10-CM

## 2021-06-01 DIAGNOSIS — N64.89 BREAST ASYMMETRY: ICD-10-CM

## 2021-06-09 ENCOUNTER — HOSPITAL ENCOUNTER (OUTPATIENT)
Dept: CARDIOLOGY | Facility: HOSPITAL | Age: 46
Discharge: HOME | End: 2021-06-09
Admitting: INTERNAL MEDICINE

## 2021-06-09 LAB
ALBUMIN SERPL-MCNC: 4.2 G/DL (ref 3.5–5.2)
ALBUMIN/GLOB SERPL: 2.2 G/DL
ALP SERPL-CCNC: 68 U/L (ref 39–117)
ALT SERPL W P-5'-P-CCNC: 14 U/L (ref 1–33)
ANION GAP SERPL CALCULATED.3IONS-SCNC: 8.5 MMOL/L (ref 5–15)
AST SERPL-CCNC: 12 U/L (ref 1–32)
BILIRUB SERPL-MCNC: 0.3 MG/DL (ref 0–1.2)
BUN SERPL-MCNC: 10 MG/DL (ref 6–20)
BUN/CREAT SERPL: 13 (ref 7–25)
CALCIUM SPEC-SCNC: 9.3 MG/DL (ref 8.6–10.5)
CHLORIDE SERPL-SCNC: 102 MMOL/L (ref 98–107)
CHOLEST SERPL-MCNC: 235 MG/DL (ref 0–200)
CO2 SERPL-SCNC: 26.5 MMOL/L (ref 22–29)
CREAT SERPL-MCNC: 0.77 MG/DL (ref 0.57–1)
GFR SERPL CREATININE-BSD FRML MDRD: 80 ML/MIN/1.73
GLOBULIN UR ELPH-MCNC: 1.9 GM/DL
GLUCOSE SERPL-MCNC: 111 MG/DL (ref 65–99)
HDLC SERPL-MCNC: 46 MG/DL (ref 40–60)
LDLC SERPL CALC-MCNC: 154 MG/DL (ref 0–100)
LDLC/HDLC SERPL: 3.27 {RATIO}
POTASSIUM SERPL-SCNC: 4.7 MMOL/L (ref 3.5–5.2)
PROT SERPL-MCNC: 6.1 G/DL (ref 6–8.5)
SODIUM SERPL-SCNC: 137 MMOL/L (ref 136–145)
TRIGL SERPL-MCNC: 194 MG/DL (ref 0–150)
VLDLC SERPL-MCNC: 35 MG/DL (ref 5–40)

## 2021-06-09 PROCEDURE — 80053 COMPREHEN METABOLIC PANEL: CPT | Performed by: INTERNAL MEDICINE

## 2021-06-09 PROCEDURE — 36415 COLL VENOUS BLD VENIPUNCTURE: CPT | Performed by: INTERNAL MEDICINE

## 2021-06-09 PROCEDURE — 80061 LIPID PANEL: CPT | Performed by: INTERNAL MEDICINE

## 2021-06-17 ENCOUNTER — OFFICE VISIT (OUTPATIENT)
Dept: CARDIOLOGY | Facility: CLINIC | Age: 46
End: 2021-06-17

## 2021-06-17 VITALS
WEIGHT: 188 LBS | DIASTOLIC BLOOD PRESSURE: 78 MMHG | BODY MASS INDEX: 32.1 KG/M2 | HEIGHT: 64 IN | SYSTOLIC BLOOD PRESSURE: 118 MMHG | HEART RATE: 65 BPM

## 2021-06-17 DIAGNOSIS — R06.02 SOB (SHORTNESS OF BREATH): ICD-10-CM

## 2021-06-17 DIAGNOSIS — R03.0 BORDERLINE SYSTOLIC HTN: ICD-10-CM

## 2021-06-17 DIAGNOSIS — R07.9 CHEST PAIN, UNSPECIFIED TYPE: ICD-10-CM

## 2021-06-17 DIAGNOSIS — E78.5 HYPERLIPIDEMIA, UNSPECIFIED HYPERLIPIDEMIA TYPE: Primary | ICD-10-CM

## 2021-06-17 PROCEDURE — 93000 ELECTROCARDIOGRAM COMPLETE: CPT | Performed by: INTERNAL MEDICINE

## 2021-06-17 PROCEDURE — 99214 OFFICE O/P EST MOD 30 MIN: CPT | Performed by: INTERNAL MEDICINE

## 2021-06-17 RX ORDER — PREDNISONE 10 MG/1
TABLET ORAL
COMMUNITY
Start: 2021-06-14 | End: 2023-02-28

## 2021-06-17 RX ORDER — BISOPROLOL FUMARATE 5 MG/1
5 TABLET, FILM COATED ORAL DAILY
Qty: 30 TABLET | Refills: 6 | Status: SHIPPED | OUTPATIENT
Start: 2021-06-17 | End: 2022-02-18

## 2021-06-17 NOTE — PROGRESS NOTES
1 YR FOLLOW UP WITH LAB RESULTS   Subjective:        Maria Ines Garcia is a 45 y.o. female who here for follow up    CC  Follow-up hypertension shortness of breath  HPI  45-year-old female with shortness of breath benign essential arterial hypertension hyperlipidemia here for the follow-up with no complaints of chest pains tightness heaviness or the pressure sensation     Problems Addressed this Visit        Cardiac and Vasculature    Borderline systolic HTN    Chest pain       Pulmonary and Pneumonias    SOB (shortness of breath)      Other Visit Diagnoses     Hyperlipidemia, unspecified hyperlipidemia type    -  Primary    Relevant Orders    Lipid Panel    Comprehensive Metabolic Panel      Diagnoses       Codes Comments    Hyperlipidemia, unspecified hyperlipidemia type    -  Primary ICD-10-CM: E78.5  ICD-9-CM: 272.4     Chest pain, unspecified type     ICD-10-CM: R07.9  ICD-9-CM: 786.50     Borderline systolic HTN     ICD-10-CM: R03.0  ICD-9-CM: 796.2     SOB (shortness of breath)     ICD-10-CM: R06.02  ICD-9-CM: 786.05         Component   Ref Range & Units 8 d ago   Total Cholesterol   0 - 200 mg/dL 235High     Triglycerides   0 - 150 mg/dL 194High     HDL Cholesterol   40 - 60 mg/dL 46    LDL Cholesterol    0 - 100 mg/dL 154High     VLDL Cholesterol   5 - 40 mg/dL 35      Component   Ref Range & Units 1 yr ago   Total Cholesterol   0 - 200 mg/dL 198    Triglycerides   0 - 150 mg/dL 144    HDL Cholesterol   40 - 60 mg/dL 47    LDL Cholesterol    0 - 100 mg/dL 122High     VLDL Cholesterol   5 - 40 mg/dL 28.8    LDL/HDL Ratio  2.60      Component   Ref Range & Units 8 d ago   (6/9/21) 1 yr ago   (5/15/20) 1 yr ago   (11/19/19)   Glucose   65 - 99 mg/dL 111High   104High   120High     BUN   6 - 20 mg/dL 10  10  10    Creatinine   0.57 - 1.00 mg/dL 0.77  0.68  0.79    Sodium   136 - 145 mmol/L 137  139  141    Potassium   3.5 - 5.2 mmol/L 4.7  4.3  4.0    Chloride   98 - 107 mmol/L 102  103  103    CO2   22.0 - 29.0  "mmol/L 26.5  23.4  22.2    Calcium   8.6 - 10.5 mg/dL 9.3  9.2  9.1    Total Protein   6.0 - 8.5 g/dL 6.1  6.7     Albumin   3.50 - 5.20 g/dL 4.20  4.30     ALT (SGPT)   1 - 33 U/L 14  11     AST (SGOT)   1 - 32 U/L 12  16     Alkaline Phosphatase   39 - 117 U/L 68  69     Total Bilirubin   0.0 - 1.2 mg/dL 0.3  0.4 R     eGFR Non African Amer   >60 mL/min/1.73 80  93  78    Globulin   gm/dL 1.9          .    The following portions of the patient's history were reviewed and updated as appropriate: allergies, current medications, past family history, past medical history, past social history, past surgical history and problem list.    Past Medical History:   Diagnosis Date   • Abnormal Pap smear of cervix    • COVID-19 12/2020   • Heavy periods    • History of hepatitis B 1990   • Hypertension    • Rosacea    • Seasonal allergies    • Uterine fibroid      reports that she quit smoking about 12 years ago. Her smoking use included cigarettes. She has a 31.50 pack-year smoking history. She has never used smokeless tobacco. She reports current alcohol use. She reports that she does not use drugs.   Family History   Problem Relation Age of Onset   • Heart disease Mother    • Hypertension Mother    • Heart disease Paternal Uncle    • Arrhythmia Maternal Grandmother    • Emphysema Father    • COPD Father    • Malig Hyperthermia Neg Hx        Review of Systems  Constitutional: No wt loss, fever, fatigue  Gastrointestinal: No nausea, abdominal pain  Behavioral/Psych: No insomnia or anxiety   Cardiovascular no chest pains or tightness in the chest  Objective:       Physical Exam  /78   Pulse 65   Ht 162.6 cm (64\")   Wt 85.3 kg (188 lb)   BMI 32.27 kg/m²   General appearance: No acute changes   Neck: Trachea midline; NECK, supple, no thyromegaly or lymphadenopathy   Lungs: Normal size and shape, normal breath sounds, equal distribution of air, no rales and rhonchi   CV: S1-S2 regular, no murmurs, no rub, no gallop "   Abdomen: Soft, non-tender; no masses , no abnormal abdominal sounds   Extremities: No deformity , normal color , no peripheral edema   Skin: Normal temperature, turgor and texture; no rash, ulcers            ECG 12 Lead    Date/Time: 6/17/2021 2:29 PM  Performed by: Mae Espino MD  Authorized by: Mae Espino MD   Comparison: compared with previous ECG   Similar to previous ECG  Rhythm: sinus rhythm  ST Flattening: all    Clinical impression: non-specific ECG              Echocardiogram:        Current Outpatient Medications:   •  aspirin 81 MG chewable tablet, Chew 81 mg Daily., Disp: , Rfl:   •  bisoprolol-hydrochlorothiazide (ZIAC) 5-6.25 MG per tablet, TAKE 1 TABLET BY MOUTH EVERY DAY - Insurance will only pay for a 30 day supply, Disp: 90 tablet, Rfl: 1  •  buPROPion XL (Wellbutrin XL) 150 MG 24 hr tablet, Take 1 tablet by mouth Every Morning., Disp: 30 tablet, Rfl: 11  •  Fexofenadine HCl (ALLERGY 24-HR PO), Take 1 tablet by mouth Every Morning., Disp: , Rfl:   •  GARLIC PO, Take 500 mg by mouth Daily., Disp: , Rfl:   •  predniSONE (DELTASONE) 10 MG tablet, TAKE 6 TABLETS BY MOUTH ON DAY 1, THEN DECREASE BY 1 TABLET EACH DAY UNTIL GONE, Disp: , Rfl:    Assessment:        Patient Active Problem List   Diagnosis   • Class 1 obesity due to excess calories without serious comorbidity with body mass index (BMI) of 30.0 to 30.9 in adult   • Borderline systolic HTN   • Chest pain   • SOB (shortness of breath)   • Intramural and submucous leiomyoma of uterus   • Menorrhagia with regular cycle               Plan:            ICD-10-CM ICD-9-CM   1. Hyperlipidemia, unspecified hyperlipidemia type  E78.5 272.4   2. Chest pain, unspecified type  R07.9 786.50   3. Borderline systolic HTN  R03.0 796.2   4. SOB (shortness of breath)  R06.02 786.05     1. Hyperlipidemia, unspecified hyperlipidemia type  Start Crestor  - Lipid Panel  - Comprehensive Metabolic Panel; Future    2. Chest pain, unspecified  type  Atypical    3. Borderline systolic HTN  Continue to keep an eye on blood pressure    4. SOB (shortness of breath)  Multifactorial       CRESTOR 5 MG PO DAILY  SEE IN 6 WKS WITH FLP, CMP    Pros and cons of this new medication / change medication has been explained to  the patient    Possible side effects has been explained    Associated need of the blood  Work has been explained    Need for the compliance of the medication has been explained    COUNSELING:    Maria Ines Angel was given to patient for the following topics: diagnostic results, risk factor reductions, impressions, risks and benefits of treatment options and importance of treatment compliance .       SMOKING COUNSELING:    [unfilled]    Dictated using Dragon dictation

## 2021-06-25 NOTE — TELEPHONE ENCOUNTER
----- Message from Nadeen Kessler sent at 6/25/2021 10:00 AM EDT -----  Regarding: medication question  Pt called and said that Dr. PALMER started her on a cholesterol medication (she was unsure of the name) and states it was not called in after the 6/17 appt.    I checked the AVS and her medications and didn't see a new medication (only for her to change the bisoprolol).     Can you please look into this to see if there is anything she should be taking?    [phone] (she will follow up by Tues or Wed of next wk if she hasn't received a call back by then)

## 2021-06-25 NOTE — TELEPHONE ENCOUNTER
Per Dr PALMER note pt was started on Crestor 5 mg qd.     Called and informed pt   RX sent to [facility]

## 2021-06-28 RX ORDER — ROSUVASTATIN CALCIUM 5 MG/1
5 TABLET, COATED ORAL DAILY
Qty: 30 TABLET | Refills: 11 | Status: SHIPPED | OUTPATIENT
Start: 2021-06-28 | End: 2022-07-01

## 2021-08-19 ENCOUNTER — LAB (OUTPATIENT)
Dept: LAB | Facility: HOSPITAL | Age: 46
End: 2021-08-19

## 2021-08-19 DIAGNOSIS — E78.5 HYPERLIPIDEMIA, UNSPECIFIED HYPERLIPIDEMIA TYPE: ICD-10-CM

## 2021-08-19 PROCEDURE — 80061 LIPID PANEL: CPT | Performed by: INTERNAL MEDICINE

## 2021-08-19 PROCEDURE — 36415 COLL VENOUS BLD VENIPUNCTURE: CPT

## 2021-08-19 PROCEDURE — 80053 COMPREHEN METABOLIC PANEL: CPT

## 2021-08-20 LAB
ALBUMIN SERPL-MCNC: 5.5 G/DL (ref 3.5–5.2)
ALBUMIN/GLOB SERPL: 5 G/DL
ALP SERPL-CCNC: 66 U/L (ref 39–117)
ALT SERPL W P-5'-P-CCNC: 23 U/L (ref 1–33)
ANION GAP SERPL CALCULATED.3IONS-SCNC: -0.7 MMOL/L (ref 5–15)
AST SERPL-CCNC: 19 U/L (ref 1–32)
BILIRUB SERPL-MCNC: 0.2 MG/DL (ref 0–1.2)
BUN SERPL-MCNC: 7 MG/DL (ref 6–20)
BUN/CREAT SERPL: 8.8 (ref 7–25)
CALCIUM SPEC-SCNC: 7 MG/DL (ref 8.6–10.5)
CHLORIDE SERPL-SCNC: 104 MMOL/L (ref 98–107)
CHOLEST SERPL-MCNC: 174 MG/DL (ref 0–200)
CO2 SERPL-SCNC: 36.7 MMOL/L (ref 22–29)
CREAT SERPL-MCNC: 0.8 MG/DL (ref 0.57–1)
GFR SERPL CREATININE-BSD FRML MDRD: 77 ML/MIN/1.73
GLOBULIN UR ELPH-MCNC: 1.1 GM/DL
GLUCOSE SERPL-MCNC: 90 MG/DL (ref 65–99)
HDLC SERPL-MCNC: 74 MG/DL (ref 40–60)
LDLC SERPL CALC-MCNC: 75 MG/DL (ref 0–100)
LDLC/HDLC SERPL: 0.95 {RATIO}
POTASSIUM SERPL-SCNC: 4.6 MMOL/L (ref 3.5–5.2)
PROT SERPL-MCNC: 6.6 G/DL (ref 6–8.5)
SODIUM SERPL-SCNC: 140 MMOL/L (ref 136–145)
TRIGL SERPL-MCNC: 149 MG/DL (ref 0–150)
VLDLC SERPL-MCNC: 25 MG/DL (ref 5–40)

## 2021-08-23 ENCOUNTER — OFFICE VISIT (OUTPATIENT)
Dept: CARDIOLOGY | Facility: CLINIC | Age: 46
End: 2021-08-23

## 2021-08-23 VITALS
DIASTOLIC BLOOD PRESSURE: 84 MMHG | SYSTOLIC BLOOD PRESSURE: 147 MMHG | BODY MASS INDEX: 32.61 KG/M2 | HEIGHT: 64 IN | HEART RATE: 77 BPM | WEIGHT: 191 LBS

## 2021-08-23 DIAGNOSIS — E78.5 HYPERLIPIDEMIA LDL GOAL <70: Primary | ICD-10-CM

## 2021-08-23 DIAGNOSIS — R03.0 BORDERLINE SYSTOLIC HTN: ICD-10-CM

## 2021-08-23 DIAGNOSIS — R06.02 SOB (SHORTNESS OF BREATH): ICD-10-CM

## 2021-08-23 DIAGNOSIS — R07.9 CHEST PAIN, UNSPECIFIED TYPE: ICD-10-CM

## 2021-08-23 PROCEDURE — 99213 OFFICE O/P EST LOW 20 MIN: CPT | Performed by: INTERNAL MEDICINE

## 2021-08-23 NOTE — PROGRESS NOTES
FOLLOW UP WITH LAB RESULTS   Subjective:        Maria Ines Garcia is a 45 y.o. female who here for follow up    CC  FOLLOW UP CRESTOR  HPI  45-year-old female with benign essential arterial hypertension, chest pains and shortness of breath here for the follow-up after starting on Crestor     Problems Addressed this Visit        Cardiac and Vasculature    Borderline systolic HTN    Chest pain       Pulmonary and Pneumonias    SOB (shortness of breath)      Other Visit Diagnoses     Hyperlipidemia LDL goal <70    -  Primary    Relevant Orders    Comprehensive Metabolic Panel    Lipid Panel      Diagnoses       Codes Comments    Hyperlipidemia LDL goal <70    -  Primary ICD-10-CM: E78.5  ICD-9-CM: 272.4     SOB (shortness of breath)     ICD-10-CM: R06.02  ICD-9-CM: 786.05     Borderline systolic HTN     ICD-10-CM: R03.0  ICD-9-CM: 796.2     Chest pain, unspecified type     ICD-10-CM: R07.9  ICD-9-CM: 786.50         Component   Ref Range & Units 4 d ago   Total Cholesterol   0 - 200 mg/dL 174    Triglycerides   0 - 150 mg/dL 149    HDL Cholesterol   40 - 60 mg/dL 74High     LDL Cholesterol    0 - 100 mg/dL 75    VLDL Cholesterol   5 - 40 mg/dL 25      Ref Range & Units 2 mo ago   Total Cholesterol   0 - 200 mg/dL 235High     Triglycerides   0 - 150 mg/dL 194High     HDL Cholesterol   40 - 60 mg/dL 46    LDL Cholesterol    0 - 100 mg/dL 154High      Ref Range & Units 4 d ago   Glucose   65 - 99 mg/dL 90    BUN   6 - 20 mg/dL 7    Creatinine   0.57 - 1.00 mg/dL 0.80    Sodium   136 - 145 mmol/L 140    Potassium   3.5 - 5.2 mmol/L 4.6    Chloride   98 - 107 mmol/L 104    CO2   22.0 - 29.0 mmol/L 36.7High     Calcium   8.6 - 10.5 mg/dL 7.0Low     Total Protein   6.0 - 8.5 g/dL 6.6    Albumin   3.50 - 5.20 g/dL 5.50High     ALT (SGPT)   1 - 33 U/L 23    AST (SGOT)   1 - 32 U/L 19    Alkaline Phosphatase   39 - 117 U/L 66    Total Bilirubin   0.0 - 1.2 mg/dL 0.2    eGFR Non African Amer   >60 mL/min/1.73 77    Globulin   gm/dL  1.1    A/G Ratio   g/dL 5.0    BUN/Creatinine Ratio   7.0 - 25.0 8.8    Anion Gap   5.0 - 15.0 mmol/L -0.7Low     Resulting Agency  [facility]      Narrative  Performed by:  [facility]  GFR Normal >60   Chronic Kidney Disease <60   Kidney Failure <15       Specimen Collected: 08/19/21 08:43   Last Resulted: 08/20/21 10:48        Lab Flowsheet     Order Details     View Encounter     Lab and Collection Details     Routing     Result History           Related Result Highlights     Lipid Panel  Final result 8/19/2021                  Routing History    Priority Sent On From To Message Type    8/20/2021 10:48 AM Lab, Background User Mae Espino MD Results   Result Read / Acknowledged    Acknowledge result  No acknowledgement history exists for this order.   Lab Component SmartPhrase Guide    Comprehensive Metabolic Panel (Order #[id number]) on 8/19/21   Related Result Highlights     Lipid Panel  Final result 8/19/2021                .    The following portions of the patient's history were reviewed and updated as appropriate: allergies, current medications, past family history, past medical history, past social history, past surgical history and problem list.    Past Medical History:   Diagnosis Date   • Abnormal Pap smear of cervix    • COVID-19 12/2020   • Heavy periods    • History of hepatitis B 1990   • Hypertension    • Rosacea    • Seasonal allergies    • Uterine fibroid      reports that she quit smoking about 12 years ago. Her smoking use included cigarettes. She has a 31.50 pack-year smoking history. She has never used smokeless tobacco. She reports current alcohol use. She reports that she does not use drugs.   Family History   Problem Relation Age of Onset   • Heart disease Mother    • Hypertension Mother    • Heart disease Paternal Uncle    • Arrhythmia Maternal Grandmother    • Emphysema Father    • COPD Father    • Malig Hyperthermia Neg Hx        Review of Systems  Constitutional: No wt loss,  "fever, fatigue  Gastrointestinal: No nausea, abdominal pain  Behavioral/Psych: No insomnia or anxiety   Cardiovascular no chest pains or tightness in the chest  Objective:       Physical Exam  /84   Pulse 77   Ht 162.6 cm (64\")   Wt 86.6 kg (191 lb)   BMI 32.79 kg/m²   General appearance: No acute changes   Neck: Trachea midline; NECK, supple, no thyromegaly or lymphadenopathy   Lungs: Normal size and shape, normal breath sounds, equal distribution of air, no rales and rhonchi   CV: S1-S2 regular, no murmurs, no rub, no gallop   Abdomen: Soft, non-tender; no masses , no abnormal abdominal sounds   Extremities: No deformity , normal color , no peripheral edema   Skin: Normal temperature, turgor and texture; no rash, ulcers          Procedures      Echocardiogram:        Current Outpatient Medications:   •  aspirin 81 MG chewable tablet, Chew 81 mg Daily., Disp: , Rfl:   •  bisoprolol (ZEBeta) 5 MG tablet, Take 1 tablet by mouth Daily., Disp: 30 tablet, Rfl: 6  •  buPROPion XL (Wellbutrin XL) 150 MG 24 hr tablet, Take 1 tablet by mouth Every Morning., Disp: 30 tablet, Rfl: 11  •  Fexofenadine HCl (ALLERGY 24-HR PO), Take 1 tablet by mouth Every Morning., Disp: , Rfl:   •  GARLIC PO, Take 500 mg by mouth Daily., Disp: , Rfl:   •  predniSONE (DELTASONE) 10 MG tablet, TAKE 6 TABLETS BY MOUTH ON DAY 1, THEN DECREASE BY 1 TABLET EACH DAY UNTIL GONE, Disp: , Rfl:   •  rosuvastatin (CRESTOR) 5 MG tablet, Take 1 tablet by mouth Daily., Disp: 30 tablet, Rfl: 11   Assessment:        Patient Active Problem List   Diagnosis   • Class 1 obesity due to excess calories without serious comorbidity with body mass index (BMI) of 30.0 to 30.9 in adult   • Borderline systolic HTN   • Chest pain   • SOB (shortness of breath)   • Intramural and submucous leiomyoma of uterus   • Menorrhagia with regular cycle               Plan:            ICD-10-CM ICD-9-CM   1. Hyperlipidemia LDL goal <70  E78.5 272.4   2. SOB (shortness of " breath)  R06.02 786.05   3. Borderline systolic HTN  R03.0 796.2   4. Chest pain, unspecified type  R07.9 786.50     1. SOB (shortness of breath)  Multifactorial    2. Borderline systolic HTN  Blood pressure under control    3. Hyperlipidemia LDL goal <70  Continue current treatment  - Comprehensive Metabolic Panel; Future  - Lipid Panel; Future    4. Chest pain, unspecified type  Atypical      6 MONTHS WITH LABS  COUNSELING:    Maria Ines Angel was given to patient for the following topics: diagnostic results, risk factor reductions, impressions, risks and benefits of treatment options and importance of treatment compliance .       SMOKING COUNSELING:    [unfilled]    Dictated using Dragon dictation

## 2021-11-22 ENCOUNTER — APPOINTMENT (OUTPATIENT)
Dept: WOMENS IMAGING | Facility: HOSPITAL | Age: 46
End: 2021-11-22

## 2021-11-22 PROCEDURE — G0279 TOMOSYNTHESIS, MAMMO: HCPCS | Performed by: RADIOLOGY

## 2021-11-22 PROCEDURE — 77065 DX MAMMO INCL CAD UNI: CPT | Performed by: RADIOLOGY

## 2021-11-22 PROCEDURE — 77061 BREAST TOMOSYNTHESIS UNI: CPT | Performed by: RADIOLOGY

## 2021-11-29 ENCOUNTER — TELEPHONE (OUTPATIENT)
Dept: OBSTETRICS AND GYNECOLOGY | Facility: CLINIC | Age: 46
End: 2021-11-29

## 2021-11-29 DIAGNOSIS — N64.89 BREAST ASYMMETRY: ICD-10-CM

## 2021-11-29 DIAGNOSIS — R92.8 ABNORMAL MAMMOGRAM OF LEFT BREAST: Primary | ICD-10-CM

## 2021-11-29 DIAGNOSIS — R92.8 ABNORMAL MAMMOGRAM OF LEFT BREAST: ICD-10-CM

## 2021-11-29 DIAGNOSIS — N63.20 BREAST MASS, LEFT: ICD-10-CM

## 2022-02-18 RX ORDER — BISOPROLOL FUMARATE 5 MG/1
5 TABLET, FILM COATED ORAL DAILY
Qty: 30 TABLET | Refills: 3 | Status: SHIPPED | OUTPATIENT
Start: 2022-02-18 | End: 2022-06-27

## 2022-03-01 ENCOUNTER — LAB (OUTPATIENT)
Dept: LAB | Facility: HOSPITAL | Age: 47
End: 2022-03-01

## 2022-03-01 DIAGNOSIS — E78.5 HYPERLIPIDEMIA LDL GOAL <70: ICD-10-CM

## 2022-03-01 LAB
ALBUMIN SERPL-MCNC: 4.4 G/DL (ref 3.5–5.2)
ALBUMIN/GLOB SERPL: 1.8 G/DL
ALP SERPL-CCNC: 78 U/L (ref 39–117)
ALT SERPL W P-5'-P-CCNC: 12 U/L (ref 1–33)
ANION GAP SERPL CALCULATED.3IONS-SCNC: 11.8 MMOL/L (ref 5–15)
AST SERPL-CCNC: 12 U/L (ref 1–32)
BILIRUB SERPL-MCNC: 0.3 MG/DL (ref 0–1.2)
BUN SERPL-MCNC: 8 MG/DL (ref 6–20)
BUN/CREAT SERPL: 11 (ref 7–25)
CALCIUM SPEC-SCNC: 9.7 MG/DL (ref 8.6–10.5)
CHLORIDE SERPL-SCNC: 104 MMOL/L (ref 98–107)
CHOLEST SERPL-MCNC: 152 MG/DL (ref 0–200)
CO2 SERPL-SCNC: 25.2 MMOL/L (ref 22–29)
CREAT SERPL-MCNC: 0.73 MG/DL (ref 0.57–1)
EGFRCR SERPLBLD CKD-EPI 2021: 101.6 ML/MIN/1.73
GLOBULIN UR ELPH-MCNC: 2.4 GM/DL
GLUCOSE SERPL-MCNC: 92 MG/DL (ref 65–99)
HDLC SERPL-MCNC: 51 MG/DL (ref 40–60)
LDLC SERPL CALC-MCNC: 78 MG/DL (ref 0–100)
LDLC/HDLC SERPL: 1.46 {RATIO}
POTASSIUM SERPL-SCNC: 4.6 MMOL/L (ref 3.5–5.2)
PROT SERPL-MCNC: 6.8 G/DL (ref 6–8.5)
SODIUM SERPL-SCNC: 141 MMOL/L (ref 136–145)
TRIGL SERPL-MCNC: 132 MG/DL (ref 0–150)
VLDLC SERPL-MCNC: 23 MG/DL (ref 5–40)

## 2022-03-01 PROCEDURE — 80053 COMPREHEN METABOLIC PANEL: CPT

## 2022-03-01 PROCEDURE — 80061 LIPID PANEL: CPT

## 2022-03-03 ENCOUNTER — OFFICE VISIT (OUTPATIENT)
Dept: CARDIOLOGY | Facility: CLINIC | Age: 47
End: 2022-03-03

## 2022-03-03 VITALS
DIASTOLIC BLOOD PRESSURE: 68 MMHG | HEIGHT: 64 IN | WEIGHT: 190 LBS | HEART RATE: 72 BPM | SYSTOLIC BLOOD PRESSURE: 107 MMHG | BODY MASS INDEX: 32.44 KG/M2

## 2022-03-03 DIAGNOSIS — E66.09 CLASS 1 OBESITY DUE TO EXCESS CALORIES WITHOUT SERIOUS COMORBIDITY WITH BODY MASS INDEX (BMI) OF 30.0 TO 30.9 IN ADULT: ICD-10-CM

## 2022-03-03 DIAGNOSIS — R07.9 CHEST PAIN, UNSPECIFIED TYPE: ICD-10-CM

## 2022-03-03 DIAGNOSIS — R06.02 SOB (SHORTNESS OF BREATH): ICD-10-CM

## 2022-03-03 DIAGNOSIS — R03.0 BORDERLINE SYSTOLIC HTN: Primary | ICD-10-CM

## 2022-03-03 PROCEDURE — 99214 OFFICE O/P EST MOD 30 MIN: CPT | Performed by: INTERNAL MEDICINE

## 2022-03-03 NOTE — PROGRESS NOTES
6 months with lab results    Subjective:        Maria Ines Garcia is a 46 y.o. female who here for follow up    CC  RARE LT SIDED  HPI  46-year-old old female has been complaining of rare left-sided chest pain intermittent mild to moderate in intensity     Problems Addressed this Visit        Cardiac and Vasculature    Borderline systolic HTN - Primary    Relevant Orders    Treadmill Stress Test    Lipid Panel    Comprehensive Metabolic Panel    Chest pain       Endocrine and Metabolic    Class 1 obesity due to excess calories without serious comorbidity with body mass index (BMI) of 30.0 to 30.9 in adult       Pulmonary and Pneumonias    SOB (shortness of breath)      Diagnoses       Codes Comments    Borderline systolic HTN    -  Primary ICD-10-CM: R03.0  ICD-9-CM: 796.2     Class 1 obesity due to excess calories without serious comorbidity with body mass index (BMI) of 30.0 to 30.9 in adult     ICD-10-CM: E66.09, Z68.30  ICD-9-CM: 278.00, V85.30     Chest pain, unspecified type     ICD-10-CM: R07.9  ICD-9-CM: 786.50     SOB (shortness of breath)     ICD-10-CM: R06.02  ICD-9-CM: 786.05         .    The following portions of the patient's history were reviewed and updated as appropriate: allergies, current medications, past family history, past medical history, past social history, past surgical history and problem list.    Past Medical History:   Diagnosis Date   • Abnormal Pap smear of cervix    • COVID-19 12/2020   • Heavy periods    • History of hepatitis B 1990   • Hypertension    • Rosacea    • Seasonal allergies    • Uterine fibroid      reports that she quit smoking about 13 years ago. Her smoking use included cigarettes. She has a 31.50 pack-year smoking history. She has never used smokeless tobacco. She reports current alcohol use. She reports that she does not use drugs.   Family History   Problem Relation Age of Onset   • Heart disease Mother    • Hypertension Mother    • Heart disease Paternal Uncle    •  "Arrhythmia Maternal Grandmother    • Emphysema Father    • COPD Father    • Malig Hyperthermia Neg Hx        Review of Systems  Constitutional: No wt loss, fever, fatigue  Gastrointestinal: No nausea, abdominal pain  Behavioral/Psych: No insomnia or anxiety   Cardiovascular left-sided chest pain  Objective:       Physical Exam  /68 Comment: manual  Pulse 72   Ht 162.6 cm (64\")   Wt 86.2 kg (190 lb)   BMI 32.61 kg/m²   General appearance: No acute changes   Neck: Trachea midline; NECK, supple, no thyromegaly or lymphadenopathy   Lungs: Normal size and shape, normal breath sounds, equal distribution of air, no rales and rhonchi   CV: S1-S2 regular, no murmurs, no rub, no gallop   Abdomen: Soft, nontender; no masses , no abnormal abdominal sounds   Extremities: No deformity , normal color , no peripheral edema   Skin: Normal temperature, turgor and texture; no rash, ulcers          Procedures  Units 2 d ago 6 mo ago 8 mo ago 1 yr ago 3 yr ago   Total Cholesterol   0 - 200 mg/dL 152  174  235 High   198  202 High     Triglycerides   0 - 150 mg/dL 132  149  194 High   144  166 High     HDL Cholesterol   40 - 60 mg/dL 51  74 High   46  47  47    LDL Cholesterol    0 - 100 mg/dL 78  75  154 High   122 High   122 High     VLDL Cholesterol   5 - 40 mg/dL 23  25  35  28.8  33.2    LDL/HDL Ratio  1.46              Echocardiogram:        Current Outpatient Medications:   •  aspirin 81 MG chewable tablet, Chew 81 mg Daily., Disp: , Rfl:   •  bisoprolol (ZEBeta) 5 MG tablet, TAKE 1 TABLET BY MOUTH DAILY, Disp: 30 tablet, Rfl: 3  •  buPROPion XL (Wellbutrin XL) 150 MG 24 hr tablet, Take 1 tablet by mouth Every Morning., Disp: 30 tablet, Rfl: 11  •  Fexofenadine HCl (ALLERGY 24-HR PO), Take 1 tablet by mouth Every Morning., Disp: , Rfl:   •  GARLIC PO, Take 500 mg by mouth Daily., Disp: , Rfl:   •  predniSONE (DELTASONE) 10 MG tablet, TAKE 6 TABLETS BY MOUTH ON DAY 1, THEN DECREASE BY 1 TABLET EACH DAY UNTIL GONE, Disp: , " Rfl:   •  rosuvastatin (CRESTOR) 5 MG tablet, Take 1 tablet by mouth Daily., Disp: 30 tablet, Rfl: 11   Assessment:        Patient Active Problem List   Diagnosis   • Class 1 obesity due to excess calories without serious comorbidity with body mass index (BMI) of 30.0 to 30.9 in adult   • Borderline systolic HTN   • Chest pain   • SOB (shortness of breath)   • Intramural and submucous leiomyoma of uterus   • Menorrhagia with regular cycle               Plan:            ICD-10-CM ICD-9-CM   1. Borderline systolic HTN  R03.0 796.2   2. Class 1 obesity due to excess calories without serious comorbidity with body mass index (BMI) of 30.0 to 30.9 in adult  E66.09 278.00    Z68.30 V85.30   3. Chest pain, unspecified type  R07.9 786.50   4. SOB (shortness of breath)  R06.02 786.05     1. Borderline systolic HTN    - Treadmill Stress Test  - Lipid Panel; Future  - Comprehensive Metabolic Panel; Future    2. Class 1 obesity due to excess calories without serious comorbidity with body mass index (BMI) of 30.0 to 30.9 in adult  Counseling done    3. Chest pain, unspecified type  Under control    4. SOB (shortness of breath)  Under control  Considering the patient's symptoms as well as clinical situation and  EKG findings, along with cardiac risk factors, ischemic workup is necessary to rule out ischemic cardiomyopathy, stress induced arrhythmias, and functional capacity for diagnosis as well as prognostic consideration         1 YR WITH LABS    ETT NOW IF OK SEE IN 1 YR  COUNSELING:    Maria Ines Angel was given to patient for the following topics: diagnostic results, risk factor reductions, impressions, risks and benefits of treatment options and importance of treatment compliance .       SMOKING COUNSELING:    [unfilled]    Dictated using Dragon dictation

## 2022-03-31 ENCOUNTER — APPOINTMENT (OUTPATIENT)
Dept: CARDIOLOGY | Facility: HOSPITAL | Age: 47
End: 2022-03-31

## 2022-04-21 ENCOUNTER — HOSPITAL ENCOUNTER (OUTPATIENT)
Dept: CARDIOLOGY | Facility: HOSPITAL | Age: 47
Discharge: HOME | End: 2022-04-21
Admitting: INTERNAL MEDICINE

## 2022-04-21 VITALS — SYSTOLIC BLOOD PRESSURE: 108 MMHG | DIASTOLIC BLOOD PRESSURE: 90 MMHG | HEART RATE: 75 BPM

## 2022-04-21 PROCEDURE — 93018 CV STRESS TEST I&R ONLY: CPT | Performed by: INTERNAL MEDICINE

## 2022-04-21 PROCEDURE — 93017 CV STRESS TEST TRACING ONLY: CPT

## 2022-04-26 LAB
BH CV STRESS BP STAGE 1: NORMAL
BH CV STRESS BP STAGE 2: NORMAL
BH CV STRESS DURATION MIN STAGE 1: 3
BH CV STRESS DURATION MIN STAGE 2: 3
BH CV STRESS DURATION SEC STAGE 1: 0
BH CV STRESS DURATION SEC STAGE 2: 0
BH CV STRESS GRADE STAGE 1: 10
BH CV STRESS GRADE STAGE 2: 12
BH CV STRESS HR STAGE 1: 130
BH CV STRESS HR STAGE 2: 170
BH CV STRESS METS STAGE 1: 4.6
BH CV STRESS METS STAGE 2: 7.1
BH CV STRESS PROTOCOL 1: NORMAL
BH CV STRESS RECOVERY BP: NORMAL MMHG
BH CV STRESS RECOVERY HR: 101 BPM
BH CV STRESS SPEED STAGE 1: 1.7
BH CV STRESS SPEED STAGE 2: 2.5
BH CV STRESS STAGE 1: 1
BH CV STRESS STAGE 2: 2
MAXIMAL PREDICTED HEART RATE: 172 BPM
PERCENT MAX PREDICTED HR: 98.84 %
STRESS BASELINE BP: NORMAL MMHG
STRESS BASELINE HR: 82 BPM
STRESS PERCENT HR: 116 %
STRESS POST ESTIMATED WORKLOAD: 7.1 METS
STRESS POST EXERCISE DUR MIN: 6 MIN
STRESS POST EXERCISE DUR SEC: 0 SEC
STRESS POST PEAK BP: NORMAL MMHG
STRESS POST PEAK HR: 170 BPM
STRESS TARGET HR: 146 BPM

## 2022-05-02 DIAGNOSIS — F32.81 PREMENSTRUAL DYSPHORIC DISORDER: ICD-10-CM

## 2022-05-03 RX ORDER — BUPROPION HYDROCHLORIDE 150 MG/1
150 TABLET ORAL EVERY MORNING
Qty: 30 TABLET | Refills: 11 | Status: SHIPPED | OUTPATIENT
Start: 2022-05-03 | End: 2023-05-03

## 2022-05-23 ENCOUNTER — OFFICE VISIT (OUTPATIENT)
Dept: OBSTETRICS AND GYNECOLOGY | Facility: CLINIC | Age: 47
End: 2022-05-23

## 2022-05-23 VITALS — WEIGHT: 189 LBS | HEIGHT: 64 IN | BODY MASS INDEX: 32.27 KG/M2

## 2022-05-23 DIAGNOSIS — Z01.419 VISIT FOR GYNECOLOGIC EXAMINATION: Primary | ICD-10-CM

## 2022-05-23 DIAGNOSIS — N91.4 SECONDARY OLIGOMENORRHEA: ICD-10-CM

## 2022-05-23 DIAGNOSIS — R92.8 ABNORMAL MAMMOGRAM: ICD-10-CM

## 2022-05-23 PROCEDURE — 99396 PREV VISIT EST AGE 40-64: CPT | Performed by: OBSTETRICS & GYNECOLOGY

## 2022-05-23 NOTE — PROGRESS NOTES
[facility]  [address], [address]  [place], [place] [address]  Phone: [phone] / Fax:  [phone]      [address] [facility] [place] [address]    Geovanni Rose MD    Chief Complaint   Patient presents with   • Gynecologic Exam     Patient is here for a annual.       Maria Ines Garcia is here for annual gynecologic exam.  HPI - Patient with last pap one year ago and reported as normal.  Patient has had previous ablation; she reports regular cycles until 3 months ago.  She reports symptoms consistent with PMS with moodiness and leakage of urine around time of expected cycle.  She had work up for abnormal mammogram with last check 6 months ago that was stable.  She is due for follow up.    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   • D & C HYSTEROSCOPY N/A 2019    Procedure: AND MYOSURE;  Surgeon: Mukund Castro MD;  Location: [facility];  Service: Obstetrics/Gynecology   • D & C HYSTEROSCOPY ENDOMETRIAL ABLATION N/A 2019    Procedure: DILATATION AND CURETTAGE HYSTEROSCOPY WITH NOVASURE ENDOMETRIAL ABLATION;  Surgeon: Mukund Castro MD;  Location: [facility];  Service: Obstetrics/Gynecology   • TONSILLECTOMY AND ADENOIDECTOMY     • TUBAL ABDOMINAL LIGATION Bilateral        No Known Allergies    Social History     Socioeconomic History   • Marital status:    Tobacco Use   • Smoking status: Former Smoker     Packs/day: 1.50     Years: 21.00     Pack years: 31.50     Types: Cigarettes     Quit date:      Years since quitting:  "13.3   • Smokeless tobacco: Never Used   Vaping Use   • Vaping Use: Never used   Substance and Sexual Activity   • Alcohol use: Yes     Comment: once per week or less   • Drug use: No   • Sexual activity: Yes     Birth control/protection: Surgical       Family History   Problem Relation Age of Onset   • Heart disease Mother    • Hypertension Mother    • Heart disease Paternal Uncle    • Arrhythmia Maternal Grandmother    • Emphysema Father    • COPD Father    • Malig Hyperthermia Neg Hx        No LMP recorded. Patient has had an ablation.    OB History        3    Para        Term                AB   1    Living           SAB   1    IAB        Ectopic        Molar        Multiple        Live Births   2                Vitals:    22 1301   Weight: 85.7 kg (189 lb)   Height: 162.6 cm (64\")       Physical Exam  Constitutional:       Appearance: Normal appearance. She is well-developed.   Genitourinary:      Bladder and urethral meatus normal.      Right Labia: No tenderness or lesions.     Left Labia: No tenderness or lesions.     No vaginal discharge or tenderness.        Right Adnexa: not tender and not full.     Left Adnexa: not tender and not full.     No cervical motion tenderness or lesion.      Uterus is enlarged.      Uterus is not tender.      No urethral tenderness or hypermobility present.   Breasts:      Right: No mass or nipple discharge.      Left: No mass or nipple discharge.       HENT:      Right Ear: External ear normal.      Left Ear: External ear normal.      Nose: Nose normal.   Eyes:      Conjunctiva/sclera: Conjunctivae normal.   Neck:      Thyroid: No thyromegaly.   Cardiovascular:      Rate and Rhythm: Normal rate and regular rhythm.      Heart sounds: Normal heart sounds.   Pulmonary:      Effort: Pulmonary effort is normal.      Breath sounds: No stridor. No wheezing.   Abdominal:      Palpations: Abdomen is soft. There is no mass.      Tenderness: There is no guarding or " rebound.   Musculoskeletal:         General: Normal range of motion.      Cervical back: Normal range of motion and neck supple.   Neurological:      Mental Status: She is alert.      Coordination: Coordination normal.   Skin:     General: Skin is warm and dry.   Psychiatric:         Mood and Affect: Mood normal.         Behavior: Behavior normal.         Thought Content: Thought content normal.         Judgment: Judgment normal.   Vitals reviewed. Exam conducted with a chaperone present.         Diagnoses and all orders for this visit:    1. Visit for gynecologic examination (Primary)        -     Discussed importance of regular screening and breast awareness.        -     Patient has been vaccinated against Covid 19.  2. Secondary oligomenorrhea  -     Follicle Stimulating Hormone  -     Check for menopause, given amenorrhea.  3. Abnormal mammogram        -     Due for follow up.  Will schedule.      Mukund Castro MD

## 2022-05-24 ENCOUNTER — TELEPHONE (OUTPATIENT)
Dept: OBSTETRICS AND GYNECOLOGY | Facility: CLINIC | Age: 47
End: 2022-05-24

## 2022-05-24 LAB — FSH SERPL-ACNC: 7.6 MIU/ML

## 2022-05-24 NOTE — TELEPHONE ENCOUNTER
Left message for Maria Ines that Dr Castro wanted to let her know that her hormone/menopause test was normal. If your symptoms progress to pain or significant urinary loss, you should return to see Dr Castro. Just call the office at [phone] to make an appt. Thank you.

## 2022-05-24 NOTE — TELEPHONE ENCOUNTER
Amber    Let her know that her hormone/menopause test was normal.  If her symptoms progress to pain or significant urinary loss, she should return to see me.    Thanks    Matthew

## 2022-06-13 ENCOUNTER — TELEPHONE (OUTPATIENT)
Dept: OBSTETRICS AND GYNECOLOGY | Facility: CLINIC | Age: 47
End: 2022-06-13

## 2022-06-13 NOTE — TELEPHONE ENCOUNTER
Left message for Maria Ines that Dr Castro was notified that you were unable to keep your appointment 6/2/22 at the [facility] for your Dx Mx. You can call [phone] to reschedule your appointment. Thank you.

## 2022-06-27 RX ORDER — BISOPROLOL FUMARATE 5 MG/1
5 TABLET, FILM COATED ORAL DAILY
Qty: 30 TABLET | Refills: 5 | Status: SHIPPED | OUTPATIENT
Start: 2022-06-27 | End: 2023-01-27

## 2022-07-01 RX ORDER — ROSUVASTATIN CALCIUM 5 MG/1
5 TABLET, COATED ORAL DAILY
Qty: 30 TABLET | Refills: 3 | Status: SHIPPED | OUTPATIENT
Start: 2022-07-01 | End: 2022-11-07

## 2022-07-27 ENCOUNTER — APPOINTMENT (OUTPATIENT)
Dept: WOMENS IMAGING | Facility: HOSPITAL | Age: 47
End: 2022-07-27

## 2022-07-27 PROCEDURE — G0279 TOMOSYNTHESIS, MAMMO: HCPCS | Performed by: RADIOLOGY

## 2022-07-27 PROCEDURE — 77066 DX MAMMO INCL CAD BI: CPT | Performed by: RADIOLOGY

## 2022-07-27 PROCEDURE — 77062 BREAST TOMOSYNTHESIS BI: CPT | Performed by: RADIOLOGY

## 2022-08-05 DIAGNOSIS — R92.8 ABNORMAL MAMMOGRAM OF LEFT BREAST: ICD-10-CM

## 2022-08-12 ENCOUNTER — TELEPHONE (OUTPATIENT)
Dept: OBSTETRICS AND GYNECOLOGY | Facility: CLINIC | Age: 47
End: 2022-08-12

## 2022-08-12 NOTE — TELEPHONE ENCOUNTER
Left message for Maria Ines that Dr Castro received notification from [facility] on 8/1/22 that your breast are benign-negative. You are to return to screening in 1 year. If you have not received the letter from [facility], you can expect to receive it soon. Thank you.

## 2022-09-27 NOTE — PROGRESS NOTES
Completed screening mammo 8/19/2019.   [address]  [address] [address]  [phone]    Work/School Note    Date: 9/27/2022    To Whom It May concern:    Leandro Lujan was seen and treated today in the emergency room by the following provider(s):  Attending Provider: Obi Walton MD.      Leandro Lujan is excused from work/school on 09/27/22 and 09/28/22. She is medically clear to return to work/school on 9/29/2022.        Sincerely,          Triston Salazar MD

## 2022-11-07 RX ORDER — ROSUVASTATIN CALCIUM 5 MG/1
TABLET, COATED ORAL
Qty: 30 TABLET | Refills: 3 | Status: SHIPPED | OUTPATIENT
Start: 2022-11-07 | End: 2023-04-03

## 2022-12-12 ENCOUNTER — TRANSCRIBE ORDERS (OUTPATIENT)
Dept: ADMINISTRATIVE | Facility: HOSPITAL | Age: 47
End: 2022-12-12

## 2022-12-12 ENCOUNTER — HOSPITAL ENCOUNTER (OUTPATIENT)
Dept: GENERAL RADIOLOGY | Facility: HOSPITAL | Age: 47
Discharge: HOME | End: 2022-12-12
Admitting: PHYSICIAN ASSISTANT

## 2022-12-12 DIAGNOSIS — R76.12 REACTION TO QUANTIFERON-TB TEST: ICD-10-CM

## 2022-12-12 DIAGNOSIS — R76.12 REACTION TO QUANTIFERON-TB TEST: Primary | ICD-10-CM

## 2022-12-12 PROCEDURE — 71045 X-RAY EXAM CHEST 1 VIEW: CPT

## 2023-01-27 RX ORDER — BISOPROLOL FUMARATE 5 MG/1
5 TABLET, FILM COATED ORAL DAILY
Qty: 30 TABLET | Refills: 3 | Status: SHIPPED | OUTPATIENT
Start: 2023-01-27

## 2023-02-17 ENCOUNTER — LAB (OUTPATIENT)
Dept: LAB | Facility: HOSPITAL | Age: 48
End: 2023-02-17
Payer: COMMERCIAL

## 2023-02-17 DIAGNOSIS — R03.0 BORDERLINE SYSTOLIC HTN: ICD-10-CM

## 2023-02-17 LAB
ALBUMIN SERPL-MCNC: 4.7 G/DL (ref 3.5–5.2)
ALBUMIN/GLOB SERPL: 1.9 G/DL
ALP SERPL-CCNC: 111 U/L (ref 39–117)
ALT SERPL W P-5'-P-CCNC: 12 U/L (ref 1–33)
ANION GAP SERPL CALCULATED.3IONS-SCNC: 8 MMOL/L (ref 5–15)
AST SERPL-CCNC: 14 U/L (ref 1–32)
BILIRUB SERPL-MCNC: 0.3 MG/DL (ref 0–1.2)
BUN SERPL-MCNC: 12 MG/DL (ref 6–20)
BUN/CREAT SERPL: 16 (ref 7–25)
CALCIUM SPEC-SCNC: 10 MG/DL (ref 8.6–10.5)
CHLORIDE SERPL-SCNC: 102 MMOL/L (ref 98–107)
CHOLEST SERPL-MCNC: 204 MG/DL (ref 0–200)
CO2 SERPL-SCNC: 31 MMOL/L (ref 22–29)
CREAT SERPL-MCNC: 0.75 MG/DL (ref 0.57–1)
EGFRCR SERPLBLD CKD-EPI 2021: 97.7 ML/MIN/1.73
GLOBULIN UR ELPH-MCNC: 2.5 GM/DL
GLUCOSE SERPL-MCNC: 107 MG/DL (ref 65–99)
HDLC SERPL-MCNC: 55 MG/DL (ref 40–60)
LDLC SERPL CALC-MCNC: 112 MG/DL (ref 0–100)
LDLC/HDLC SERPL: 1.93 {RATIO}
POTASSIUM SERPL-SCNC: 4.4 MMOL/L (ref 3.5–5.2)
PROT SERPL-MCNC: 7.2 G/DL (ref 6–8.5)
SODIUM SERPL-SCNC: 141 MMOL/L (ref 136–145)
TRIGL SERPL-MCNC: 215 MG/DL (ref 0–150)
VLDLC SERPL-MCNC: 37 MG/DL (ref 5–40)

## 2023-02-17 PROCEDURE — 80061 LIPID PANEL: CPT

## 2023-02-17 PROCEDURE — 80053 COMPREHEN METABOLIC PANEL: CPT

## 2023-02-17 PROCEDURE — 36415 COLL VENOUS BLD VENIPUNCTURE: CPT

## 2023-02-28 ENCOUNTER — OFFICE VISIT (OUTPATIENT)
Dept: CARDIOLOGY | Facility: CLINIC | Age: 48
End: 2023-02-28
Payer: COMMERCIAL

## 2023-02-28 VITALS
DIASTOLIC BLOOD PRESSURE: 87 MMHG | SYSTOLIC BLOOD PRESSURE: 127 MMHG | BODY MASS INDEX: 33.46 KG/M2 | HEIGHT: 64 IN | WEIGHT: 196 LBS | HEART RATE: 67 BPM

## 2023-02-28 DIAGNOSIS — E78.5 HYPERLIPIDEMIA LDL GOAL <100: Primary | ICD-10-CM

## 2023-02-28 DIAGNOSIS — R03.0 BORDERLINE SYSTOLIC HTN: ICD-10-CM

## 2023-02-28 DIAGNOSIS — E78.5 HYPERLIPIDEMIA, UNSPECIFIED HYPERLIPIDEMIA TYPE: ICD-10-CM

## 2023-02-28 PROCEDURE — 93000 ELECTROCARDIOGRAM COMPLETE: CPT

## 2023-02-28 PROCEDURE — 99214 OFFICE O/P EST MOD 30 MIN: CPT

## 2023-04-03 RX ORDER — ROSUVASTATIN CALCIUM 5 MG/1
TABLET, COATED ORAL
Qty: 30 TABLET | Refills: 3 | Status: SHIPPED | OUTPATIENT
Start: 2023-04-03

## 2023-05-09 ENCOUNTER — OFFICE VISIT (OUTPATIENT)
Dept: FAMILY MEDICINE CLINIC | Facility: CLINIC | Age: 48
End: 2023-05-09
Payer: COMMERCIAL

## 2023-05-09 VITALS
TEMPERATURE: 98.4 F | BODY MASS INDEX: 33.68 KG/M2 | HEART RATE: 80 BPM | OXYGEN SATURATION: 99 % | HEIGHT: 64 IN | WEIGHT: 197.3 LBS | SYSTOLIC BLOOD PRESSURE: 132 MMHG | DIASTOLIC BLOOD PRESSURE: 80 MMHG

## 2023-05-09 DIAGNOSIS — I10 PRIMARY HYPERTENSION: Primary | ICD-10-CM

## 2023-05-09 DIAGNOSIS — E53.8 B12 DEFICIENCY: ICD-10-CM

## 2023-05-09 DIAGNOSIS — Z51.81 MEDICATION MONITORING ENCOUNTER: ICD-10-CM

## 2023-05-09 DIAGNOSIS — F32.81 PREMENSTRUAL DYSPHORIC DISORDER: ICD-10-CM

## 2023-05-09 DIAGNOSIS — E78.2 MIXED HYPERLIPIDEMIA: ICD-10-CM

## 2023-05-09 DIAGNOSIS — Q75.0 CRANIOSYNOSTOSIS: ICD-10-CM

## 2023-05-09 DIAGNOSIS — N95.1 VASOMOTOR SYMPTOMS DUE TO MENOPAUSE: ICD-10-CM

## 2023-05-09 DIAGNOSIS — Z12.31 VISIT FOR SCREENING MAMMOGRAM: ICD-10-CM

## 2023-05-09 DIAGNOSIS — E55.9 VITAMIN D DEFICIENCY: ICD-10-CM

## 2023-05-09 DIAGNOSIS — R53.83 OTHER FATIGUE: ICD-10-CM

## 2023-05-09 DIAGNOSIS — N93.9 ABNORMAL UTERINE BLEEDING (AUB): ICD-10-CM

## 2023-05-09 DIAGNOSIS — E66.9 OBESITY (BMI 30-39.9): ICD-10-CM

## 2023-05-09 DIAGNOSIS — Q87.0 MUENKE SYNDROME: ICD-10-CM

## 2023-05-09 DIAGNOSIS — Z11.59 NEED FOR HEPATITIS C SCREENING TEST: ICD-10-CM

## 2023-05-09 DIAGNOSIS — J30.9 ALLERGIC RHINITIS, UNSPECIFIED SEASONALITY, UNSPECIFIED TRIGGER: ICD-10-CM

## 2023-05-09 DIAGNOSIS — L71.9 ROSACEA: ICD-10-CM

## 2023-05-09 LAB
25(OH)D3 SERPL-MCNC: 18 NG/ML (ref 30–100)
ALBUMIN SERPL-MCNC: 4.6 G/DL (ref 3.5–5.2)
ALBUMIN/GLOB SERPL: 1.7 G/DL
ALP SERPL-CCNC: 102 U/L (ref 39–117)
ALT SERPL W P-5'-P-CCNC: 17 U/L (ref 1–33)
ANION GAP SERPL CALCULATED.3IONS-SCNC: 12 MMOL/L (ref 5–15)
AST SERPL-CCNC: 18 U/L (ref 1–32)
BASOPHILS # BLD AUTO: 0.05 10*3/MM3 (ref 0–0.2)
BASOPHILS NFR BLD AUTO: 0.7 % (ref 0–1.5)
BILIRUB SERPL-MCNC: 0.3 MG/DL (ref 0–1.2)
BUN SERPL-MCNC: 8 MG/DL (ref 6–20)
BUN/CREAT SERPL: 9.8 (ref 7–25)
CALCIUM SPEC-SCNC: 10.2 MG/DL (ref 8.6–10.5)
CHLORIDE SERPL-SCNC: 103 MMOL/L (ref 98–107)
CO2 SERPL-SCNC: 26 MMOL/L (ref 22–29)
CREAT SERPL-MCNC: 0.82 MG/DL (ref 0.57–1)
DEPRECATED RDW RBC AUTO: 40.8 FL (ref 37–54)
EGFRCR SERPLBLD CKD-EPI 2021: 87.8 ML/MIN/1.73
EOSINOPHIL # BLD AUTO: 0.11 10*3/MM3 (ref 0–0.4)
EOSINOPHIL NFR BLD AUTO: 1.4 % (ref 0.3–6.2)
ERYTHROCYTE [DISTWIDTH] IN BLOOD BY AUTOMATED COUNT: 12.5 % (ref 12.3–15.4)
FSH SERPL-ACNC: 78.9 MIU/ML
GLOBULIN UR ELPH-MCNC: 2.7 GM/DL
GLUCOSE SERPL-MCNC: 115 MG/DL (ref 65–99)
HCT VFR BLD AUTO: 43.1 % (ref 34–46.6)
HCV AB SER DONR QL: NORMAL
HGB BLD-MCNC: 14.7 G/DL (ref 12–15.9)
IMM GRANULOCYTES # BLD AUTO: 0.02 10*3/MM3 (ref 0–0.05)
IMM GRANULOCYTES NFR BLD AUTO: 0.3 % (ref 0–0.5)
LYMPHOCYTES # BLD AUTO: 1.96 10*3/MM3 (ref 0.7–3.1)
LYMPHOCYTES NFR BLD AUTO: 25.6 % (ref 19.6–45.3)
MCH RBC QN AUTO: 30.2 PG (ref 26.6–33)
MCHC RBC AUTO-ENTMCNC: 34.1 G/DL (ref 31.5–35.7)
MCV RBC AUTO: 88.5 FL (ref 79–97)
MONOCYTES # BLD AUTO: 0.42 10*3/MM3 (ref 0.1–0.9)
MONOCYTES NFR BLD AUTO: 5.5 % (ref 5–12)
NEUTROPHILS NFR BLD AUTO: 5.11 10*3/MM3 (ref 1.7–7)
NEUTROPHILS NFR BLD AUTO: 66.5 % (ref 42.7–76)
NRBC BLD AUTO-RTO: 0 /100 WBC (ref 0–0.2)
PLATELET # BLD AUTO: 224 10*3/MM3 (ref 140–450)
PMV BLD AUTO: 12.4 FL (ref 6–12)
POTASSIUM SERPL-SCNC: 4.7 MMOL/L (ref 3.5–5.2)
PROT SERPL-MCNC: 7.3 G/DL (ref 6–8.5)
RBC # BLD AUTO: 4.87 10*6/MM3 (ref 3.77–5.28)
SODIUM SERPL-SCNC: 141 MMOL/L (ref 136–145)
T4 FREE SERPL-MCNC: 1.1 NG/DL (ref 0.93–1.7)
TSH SERPL DL<=0.05 MIU/L-ACNC: 1.3 UIU/ML (ref 0.27–4.2)
VIT B12 BLD-MCNC: 232 PG/ML (ref 211–946)
WBC NRBC COR # BLD: 7.67 10*3/MM3 (ref 3.4–10.8)

## 2023-05-09 PROCEDURE — 80050 GENERAL HEALTH PANEL: CPT | Performed by: FAMILY MEDICINE

## 2023-05-09 PROCEDURE — 86803 HEPATITIS C AB TEST: CPT | Performed by: FAMILY MEDICINE

## 2023-05-09 PROCEDURE — 84439 ASSAY OF FREE THYROXINE: CPT | Performed by: FAMILY MEDICINE

## 2023-05-09 PROCEDURE — 82306 VITAMIN D 25 HYDROXY: CPT | Performed by: FAMILY MEDICINE

## 2023-05-09 PROCEDURE — 82607 VITAMIN B-12: CPT | Performed by: FAMILY MEDICINE

## 2023-05-09 PROCEDURE — 83001 ASSAY OF GONADOTROPIN (FSH): CPT | Performed by: FAMILY MEDICINE

## 2023-05-09 RX ORDER — ASPIRIN 81 MG/1
81 TABLET ORAL DAILY
COMMUNITY

## 2023-05-09 RX ORDER — SEMAGLUTIDE 0.25 MG/.5ML
0.25 INJECTION, SOLUTION SUBCUTANEOUS WEEKLY
Qty: 2 ML | Refills: 0 | Status: SHIPPED | OUTPATIENT
Start: 2023-05-09

## 2023-05-09 RX ORDER — BUPROPION HYDROCHLORIDE 150 MG/1
150 TABLET ORAL EVERY MORNING
Qty: 30 TABLET | Refills: 1 | Status: SHIPPED | OUTPATIENT
Start: 2023-05-09 | End: 2024-05-08

## 2023-05-09 NOTE — PROGRESS NOTES
Chief Complaint  Establish Care   Interested in weight loss  Craniosynostosis, Muenke syndrome  Family history of heart disease      Subjective        Maria Ines Garcia presents to [facility]  History of Present Illness  Patient presents today to establish care with myself.  She describes a history of craniosynostosis and reports having surgery as an infant for this.  She reports that her granddaughter had genetic testing done a couple years ago and she also had genetic testing done and was ultimately diagnosed with Muenke syndrome.  She reports that she had asked her former primary care doctor to order a skull x-ray.  This was read as normal per report on 8/7/2020.  She is starting to become concerned about some of her sutures and we did discuss getting a head CT to further evaluate.  She is interested in weight loss.  We did discuss options.  Her BMI is 33.87.  Her weight has been increasing.  I did discuss with her a trial of Wegovy.  She does have rosacea and does see dermatology, Dr. Poole.  She reports having issues with seasonal allergies as well.  She does report taking Allegra over-the-counter.  She reports taking 3 tablets a day.  I discussed with her that she should not exceed 180 mg a day.  She has had issues with vaginal bleeding.  She does see Dr. Castro with [facility] in [place].  In November 2019 she had a hysteroscopic myomectomy as well as D&C done with an endometrial ablation.  She reports doing better from this standpoint.  She will occasionally have some blood discharge.  She is being monitored by OB/GYN for this.  She is unsure if she is in menopause.  Her last FSH level back in May 2022 was 7.6 which was still within normal range.  She does have a follow-up with Dr. Castro at the end of this month.  Patient does take Wellbutrin to help out with premenstrual dysphoric disorder.  She is unsure if this is helping or if she still needs to be on them.  We did discuss  "a taper off of it to see how she does with her symptoms.  This is being prescribed by her OB/GYN.  She denies any significant issues with anxiety or depression.  She does have some vasomotor symptoms of menopause including hot flashes.  She does take bisoprolol 5 mg daily for hypertension.  Blood pressure has been adequately controlled.  She is also taking Crestor 5 mg for hyperlipidemia as well as aspirin.  She does report a family history of heart disease.  She is seeing cardiology, Dr. Espino and was most recently seen in his office on 2/28/2023.  At that time her LDL was 112.  She had declined an increase in her statin at that time.  She is being monitored with cardiology.  We did discuss getting labs drawn today.    Objective   Vital Signs:  /80 (BP Location: Left arm, Patient Position: Sitting)   Pulse 80   Temp 98.4 °F (36.9 °C) (Oral)   Ht 162.6 cm (64\")   Wt 89.5 kg (197 lb 4.8 oz)   SpO2 99%   BMI 33.87 kg/m²   Estimated body mass index is 33.87 kg/m² as calculated from the following:    Height as of this encounter: 162.6 cm (64\").    Weight as of this encounter: 89.5 kg (197 lb 4.8 oz).             Physical Exam   General: AAO ×3, no acute distress, pleasant  HEENT: Normocephalic, atraumatic, no discharge in the eyes, no discharge from the nose, no oropharyngeal erythema or exudates, and TMs intact bilaterally with no erythema, no cervical tenderness or lymphadenopathy  Cardiovascular: Regular rate and rhythm without appreciable murmur  Respiratory: Clear to auscultation bilaterally no RRW  Gastrointestinal: Soft nontender nondistended with bowel sounds present  extremities: No edema  Neurologic: CN II through XII grossly intact   Psychiatric: Normal mood and affect  Result Review :                   Assessment and Plan   Diagnoses and all orders for this visit:    1. Primary hypertension (Primary)  -     CBC & Differential  -     Comprehensive Metabolic Panel    2. Abnormal uterine " bleeding (AUB)    3. Vasomotor symptoms due to menopause  -     Follicle Stimulating Hormone    4. Medication monitoring encounter    5. Premenstrual dysphoric disorder    6. Allergic rhinitis, unspecified seasonality, unspecified trigger    7. Mixed hyperlipidemia    8. Need for hepatitis C screening test  -     Hepatitis C Antibody    9. Obesity (BMI 30-39.9)    10. 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 Craniosynostosis  -     CT Head Without Contrast; Future    13. Other fatigue  -     TSH+Free T4    14. Vitamin D deficiency  -     Vitamin D,25-Hydroxy    15. B12 deficiency  -     Vitamin B12    16. Visit for screening mammogram    Other orders  -     Semaglutide-Weight Management (Wegovy) 0.25 MG/0.5ML solution auto-injector; Inject 0.25 mg under the skin into the appropriate area as directed 1 (One) Time Per Week.  Dispense: 2 mL; Refill: 0  -     Semaglutide-Weight Management 0.5 MG/0.5ML solution auto-injector; Inject 0.5 mL under the skin into the appropriate area as directed 1 (One) Time Per Week.  Dispense: 2 mL; Refill: 0      I discussed with patient continue current management for hypertension.  We discussed having labs done for further evaluation.  I did discuss with patient getting a head CT to further evaluate her given her craniosynostosis.  She is due for mammogram but will have this done with her OB/GYN.  I did discuss with her a trial of Wegovy.  Risk and benefits were discussed.  She denies any family or personal history of thyroid cancer including medullary thyroid cancer and multiple endocrine neoplasia syndrome type II.  I plan to see her back in 1 month for follow-up.       Follow Up   Return in about 1 month (around 6/9/2023) for hypertension.  Patient was given instructions and counseling regarding her condition or for health maintenance advice. Please see specific information pulled into the AVS if appropriate.        117.9

## 2023-05-10 ENCOUNTER — PRIOR AUTHORIZATION (OUTPATIENT)
Dept: FAMILY MEDICINE CLINIC | Facility: CLINIC | Age: 48
End: 2023-05-10
Payer: COMMERCIAL

## 2023-05-31 ENCOUNTER — OFFICE VISIT (OUTPATIENT)
Dept: OBSTETRICS AND GYNECOLOGY | Facility: CLINIC | Age: 48
End: 2023-05-31

## 2023-05-31 ENCOUNTER — PROCEDURE VISIT (OUTPATIENT)
Dept: OBSTETRICS AND GYNECOLOGY | Facility: CLINIC | Age: 48
End: 2023-05-31

## 2023-05-31 VITALS
HEIGHT: 64 IN | SYSTOLIC BLOOD PRESSURE: 124 MMHG | WEIGHT: 193 LBS | BODY MASS INDEX: 32.95 KG/M2 | DIASTOLIC BLOOD PRESSURE: 72 MMHG

## 2023-05-31 DIAGNOSIS — Z78.0 MENOPAUSE: ICD-10-CM

## 2023-05-31 DIAGNOSIS — Z12.31 VISIT FOR SCREENING MAMMOGRAM: Primary | ICD-10-CM

## 2023-05-31 DIAGNOSIS — Z01.419 VISIT FOR GYNECOLOGIC EXAMINATION: Primary | ICD-10-CM

## 2023-05-31 RX ORDER — VITAMIN A, ASCORBIC ACID, CHOLECALCIFEROL, ALPHA-TOCOPHEROL ACETATE, THIAMINE HYDROCHLORIDE, RIBOFLAVIN 5-PHOSPHATE SODIUM, NIACINAMIDE, PYRIDOXINE HYDROCHLORIDE, FERROUS SULFATE AND SODIUM FLUORIDE 1500; 35; 400; 5; .5; .6; 8; .4; 10; .25 [IU]/ML; MG/ML; [IU]/ML; [IU]/ML; MG/ML; MG/ML; MG/ML; MG/ML; MG/ML; MG/ML
LIQUID ORAL
COMMUNITY

## 2023-05-31 NOTE — PROGRESS NOTES
[facility]  [address], [address]  [place], [place] [address]  Phone: [phone] / Fax:  [phone]      [address] [facility] [place] [address]    Lennox Tran DO    Chief Complaint   Patient presents with   • Gynecologic Exam     Annual Exam,last pap 21 NL HPV (-). Mammogram today.       Maria Ines Garcia is here for annual gynecologic exam.  HPI - Patient with normal pap 2 years ago.  She had breast testing in 2021 that demonstrated stable asymmetry of breasts; she underwent screening mammogram today.  She is menopausal and does not have cycles; she is on Wellbutrin for control of symptoms.    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   • D & C HYSTEROSCOPY N/A 2019    Procedure: AND MYOSURE;  Surgeon: Mukund Castro MD;  Location: [facility];  Service: Obstetrics/Gynecology   • D & C HYSTEROSCOPY ENDOMETRIAL ABLATION N/A 2019    Procedure: DILATATION AND CURETTAGE HYSTEROSCOPY WITH NOVASURE ENDOMETRIAL ABLATION;  Surgeon: Mukund Castro MD;  Location: [facility];  Service: Obstetrics/Gynecology   • TONSILLECTOMY AND ADENOIDECTOMY     • TUBAL ABDOMINAL LIGATION Bilateral        No Known Allergies    Social History     Socioeconomic History   • Marital status:    Tobacco Use   • Smoking status: Former     Packs/day: 1.50     Years: 21.00     Pack years: 31.50     Types: Cigarettes     Quit date: 2009     Years since quittin.4   • Smokeless tobacco: Never   Vaping Use   • Vaping Use: Never  "used   Substance and Sexual Activity   • Alcohol use: Yes     Comment: once per week or less   • Drug use: No   • Sexual activity: Not Currently     Birth control/protection: Surgical       Family History   Problem Relation Age of Onset   • Emphysema Father    • COPD Father    • Heart disease Mother    • Hypertension Mother    • Arrhythmia Maternal Grandmother    • Heart disease Paternal Uncle    • Malig Hyperthermia Neg Hx    • Breast cancer Neg Hx    • Ovarian cancer Neg Hx    • Uterine cancer Neg Hx    • Colon cancer Neg Hx        No LMP recorded. Patient has had an ablation.    OB History        3    Para        Term                AB   1    Living           SAB   1    IAB        Ectopic        Molar        Multiple        Live Births   2                Vitals:    23 1423   BP: 124/72   Weight: 87.5 kg (193 lb)   Height: 162.6 cm (64.02\")       Physical Exam  Constitutional:       Appearance: Normal appearance. She is well-developed.   Genitourinary:      Bladder and urethral meatus normal.      Right Labia: No tenderness or lesions.     Left Labia: No tenderness or lesions.     No vaginal discharge or tenderness.        Right Adnexa: not tender and not full.     Left Adnexa: not tender and not full.     No cervical motion tenderness or lesion.      Uterus is not enlarged or tender.      No urethral tenderness or hypermobility present.   Breasts:     Right: No mass or nipple discharge.      Left: No mass or nipple discharge.   HENT:      Right Ear: External ear normal.      Left Ear: External ear normal.      Nose: Nose normal.   Eyes:      Conjunctiva/sclera: Conjunctivae normal.   Neck:      Thyroid: No thyromegaly.   Cardiovascular:      Rate and Rhythm: Normal rate and regular rhythm.      Heart sounds: Normal heart sounds.   Pulmonary:      Effort: Pulmonary effort is normal.      Breath sounds: No stridor. No wheezing.   Abdominal:      Palpations: Abdomen is soft. There is no mass.     "  Tenderness: There is no guarding or rebound.   Musculoskeletal:         General: Normal range of motion.      Cervical back: Normal range of motion and neck supple.   Neurological:      Mental Status: She is alert.      Coordination: Coordination normal.   Skin:     General: Skin is warm and dry.   Psychiatric:         Mood and Affect: Mood normal.         Behavior: Behavior normal.         Thought Content: Thought content normal.         Judgment: Judgment normal.   Vitals reviewed. Exam conducted with a chaperone present.         Diagnoses and all orders for this visit:    1. Visit for gynecologic examination (Primary)  -  Discussed importance of regular screening and breast awareness.  2. Menopause  -  Continue Wellbutrin for control of hot flashes/night sweats.  Patient attempted to wean but symptoms returned.      Mukund Castro MD

## 2023-06-05 RX ORDER — BISOPROLOL FUMARATE 5 MG/1
5 TABLET, FILM COATED ORAL DAILY
Qty: 30 TABLET | Refills: 3 | Status: SHIPPED | OUTPATIENT
Start: 2023-06-05

## 2023-06-07 ENCOUNTER — HOSPITAL ENCOUNTER (OUTPATIENT)
Dept: CT IMAGING | Facility: HOSPITAL | Age: 48
Discharge: HOME | End: 2023-06-07
Admitting: FAMILY MEDICINE
Payer: COMMERCIAL

## 2023-06-07 DIAGNOSIS — Q75.0 CRANIOSYNOSTOSIS: ICD-10-CM

## 2023-06-07 DIAGNOSIS — Q87.0 MUENKE SYNDROME: ICD-10-CM

## 2023-06-07 PROCEDURE — 70450 CT HEAD/BRAIN W/O DYE: CPT

## 2023-06-13 ENCOUNTER — TELEPHONE (OUTPATIENT)
Dept: FAMILY MEDICINE CLINIC | Facility: CLINIC | Age: 48
End: 2023-06-13
Payer: COMMERCIAL

## 2023-06-13 NOTE — TELEPHONE ENCOUNTER
PATIENT IS HAVING ISSUES GETTING THE Semaglutide-Weight Management 0.5 MG/0.5ML solution auto-injector  AND WAS WONDERING IF SHE CAN SKIP THAT DOSE AND GOT TO THE NEXT ONE IN THE LINE

## 2023-06-14 RX ORDER — SEMAGLUTIDE 1 MG/.5ML
1 INJECTION, SOLUTION SUBCUTANEOUS WEEKLY
Qty: 2 ML | Refills: 0 | Status: SHIPPED | OUTPATIENT
Start: 2023-06-14

## 2023-06-14 NOTE — TELEPHONE ENCOUNTER
Please inform patient that I have sent the 1 mg [facility]  Should she have any issues please have her let us know.  Please encourage her to keep her appointment next month.

## 2023-06-14 NOTE — TELEPHONE ENCOUNTER
Phone call placed to patient to ask what kind of trouble she is having with getting her Wegovy 0.5mg. Patient states that she is unable to find a pharmacy that has it. She has contacted every pharmacy in [place] but has been unable to find it. Patient states that they have the next dose up in the series but not the 0.5mg. Told patient that I would consult PCP and follow up with her. Voiced understanding.

## 2023-08-15 ENCOUNTER — TELEPHONE (OUTPATIENT)
Dept: GASTROENTEROLOGY | Facility: CLINIC | Age: 48
End: 2023-08-15
Payer: COMMERCIAL

## 2023-08-15 ENCOUNTER — PREP FOR SURGERY (OUTPATIENT)
Dept: OTHER | Facility: HOSPITAL | Age: 48
End: 2023-08-15
Payer: COMMERCIAL

## 2023-08-15 DIAGNOSIS — Z12.11 ENCOUNTER FOR SCREENING FOR MALIGNANT NEOPLASM OF COLON: Primary | ICD-10-CM

## 2023-08-15 NOTE — TELEPHONE ENCOUNTER
SCREENING C/S---NO PERSONAL OR FAMILY HX OF POLYPS----- NO FAMILY HX OF COLON CA----LIST OF MEDICATIONS----            aspirin 81 MG EC tablet   bisoprolol (ZEBeta) 5 MG tablet  buPROPion XL (WELLBUTRIN XL) 150 MG 24 hr tablet  Fexofenadine HCl (ALLERGY 24-HR PO)  Ped Multivitamins-Fl-Iron (Multi-Vitamin/Fluoride/Iron) 0.25-10 MG/ML solution  rosuvastatin (CRESTOR) 5 MG tablet  Semaglutide-Weight Management (Wegovy) 1 MG/0.5ML solution auto-injector         OA QUESTIONNAIRE SCANNED IN MEDIA

## 2023-09-01 RX ORDER — ROSUVASTATIN CALCIUM 5 MG/1
5 TABLET, COATED ORAL DAILY
Qty: 30 TABLET | Refills: 5 | Status: SHIPPED | OUTPATIENT
Start: 2023-09-01

## 2023-10-03 ENCOUNTER — TELEPHONE (OUTPATIENT)
Dept: GASTROENTEROLOGY | Facility: CLINIC | Age: 48
End: 2023-10-03
Payer: COMMERCIAL

## 2023-10-03 NOTE — TELEPHONE ENCOUNTER
LM for pt to call back to schedule scope. ok for hub to read   Patient is a [facility] must be done at [facility]      Currently provider is full for the calendar year   Will call as soon as we get the 2024 calendar   Ok for theb hub will

## 2023-10-03 NOTE — TELEPHONE ENCOUNTER
Caller: JI    Relationship to patient:     Best call back number: [phone]    Patient is needing:  PATIENT NEEDING TO SCHEDULE SCOPE.  PLEASE REACH OUT TO SCHEDULE. THANK YOU

## 2023-10-03 NOTE — TELEPHONE ENCOUNTER
Caller: JI    Relationship to patient:     Best call back number: [phone]    Patient is needing:  PATIENT NEEDING TO SCHEDULE SCOPE.  PLEASE REACH OUT TO SCHEDULE. THANK YOU

## 2023-10-18 ENCOUNTER — OFFICE VISIT (OUTPATIENT)
Dept: FAMILY MEDICINE CLINIC | Facility: CLINIC | Age: 48
End: 2023-10-18
Payer: COMMERCIAL

## 2023-10-18 VITALS
BODY MASS INDEX: 33.02 KG/M2 | HEIGHT: 64 IN | SYSTOLIC BLOOD PRESSURE: 122 MMHG | OXYGEN SATURATION: 100 % | WEIGHT: 193.4 LBS | DIASTOLIC BLOOD PRESSURE: 78 MMHG | HEART RATE: 84 BPM | TEMPERATURE: 97.7 F

## 2023-10-18 DIAGNOSIS — R73.09 ABNORMAL GLUCOSE: ICD-10-CM

## 2023-10-18 DIAGNOSIS — F41.9 ANXIETY: ICD-10-CM

## 2023-10-18 DIAGNOSIS — I10 PRIMARY HYPERTENSION: Primary | ICD-10-CM

## 2023-10-18 DIAGNOSIS — R82.90 MALODOROUS URINE: ICD-10-CM

## 2023-10-18 DIAGNOSIS — N89.8 VAGINAL DISCHARGE: ICD-10-CM

## 2023-10-18 DIAGNOSIS — Q87.0 MUENKE SYNDROME: ICD-10-CM

## 2023-10-18 DIAGNOSIS — E66.9 OBESITY (BMI 30-39.9): ICD-10-CM

## 2023-10-18 DIAGNOSIS — E53.8 B12 DEFICIENCY: ICD-10-CM

## 2023-10-18 DIAGNOSIS — N89.8 VAGINAL ITCHING: ICD-10-CM

## 2023-10-18 DIAGNOSIS — Z51.81 MEDICATION MONITORING ENCOUNTER: ICD-10-CM

## 2023-10-18 DIAGNOSIS — E55.9 VITAMIN D DEFICIENCY: ICD-10-CM

## 2023-10-18 DIAGNOSIS — Q75.009 CRANIOSYNOSTOSIS, UNSPECIFIED LATERALITY, UNSPECIFIED TYPE: ICD-10-CM

## 2023-10-18 DIAGNOSIS — Z23 NEED FOR INFLUENZA VACCINATION: ICD-10-CM

## 2023-10-18 DIAGNOSIS — F32.81 PREMENSTRUAL DYSPHORIC DISORDER: ICD-10-CM

## 2023-10-18 LAB
25(OH)D3 SERPL-MCNC: 17.3 NG/ML (ref 30–100)
ALBUMIN SERPL-MCNC: 4.4 G/DL (ref 3.5–5.2)
ALBUMIN/GLOB SERPL: 1.6 G/DL
ALP SERPL-CCNC: 108 U/L (ref 39–117)
ALT SERPL W P-5'-P-CCNC: 15 U/L (ref 1–33)
ANION GAP SERPL CALCULATED.3IONS-SCNC: 11.9 MMOL/L (ref 5–15)
AST SERPL-CCNC: 18 U/L (ref 1–32)
BASOPHILS # BLD AUTO: 0.03 10*3/MM3 (ref 0–0.2)
BASOPHILS NFR BLD AUTO: 0.3 % (ref 0–1.5)
BILIRUB SERPL-MCNC: <0.2 MG/DL (ref 0–1.2)
BILIRUB UR QL STRIP: NEGATIVE
BUN SERPL-MCNC: 6 MG/DL (ref 6–20)
BUN/CREAT SERPL: 6.8 (ref 7–25)
CALCIUM SPEC-SCNC: 9.4 MG/DL (ref 8.6–10.5)
CANDIDA SPECIES: POSITIVE
CHLORIDE SERPL-SCNC: 103 MMOL/L (ref 98–107)
CLARITY UR: CLEAR
CO2 SERPL-SCNC: 26.1 MMOL/L (ref 22–29)
COLOR UR: YELLOW
CREAT SERPL-MCNC: 0.88 MG/DL (ref 0.57–1)
DEPRECATED RDW RBC AUTO: 39.1 FL (ref 37–54)
EGFRCR SERPLBLD CKD-EPI 2021: 80.2 ML/MIN/1.73
EOSINOPHIL # BLD AUTO: 0.2 10*3/MM3 (ref 0–0.4)
EOSINOPHIL NFR BLD AUTO: 2.1 % (ref 0.3–6.2)
ERYTHROCYTE [DISTWIDTH] IN BLOOD BY AUTOMATED COUNT: 12.3 % (ref 12.3–15.4)
GARDNERELLA VAGINALIS: NEGATIVE
GLOBULIN UR ELPH-MCNC: 2.7 GM/DL
GLUCOSE SERPL-MCNC: 88 MG/DL (ref 65–99)
GLUCOSE UR STRIP-MCNC: NEGATIVE MG/DL
HBA1C MFR BLD: 5.7 % (ref 4.8–5.6)
HCT VFR BLD AUTO: 40.4 % (ref 34–46.6)
HGB BLD-MCNC: 13.8 G/DL (ref 12–15.9)
HGB UR QL STRIP.AUTO: NEGATIVE
HOLD SPECIMEN: NORMAL
IMM GRANULOCYTES # BLD AUTO: 0.03 10*3/MM3 (ref 0–0.05)
IMM GRANULOCYTES NFR BLD AUTO: 0.3 % (ref 0–0.5)
KETONES UR QL STRIP: NEGATIVE
LEUKOCYTE ESTERASE UR QL STRIP.AUTO: NEGATIVE
LYMPHOCYTES # BLD AUTO: 2.49 10*3/MM3 (ref 0.7–3.1)
LYMPHOCYTES NFR BLD AUTO: 25.6 % (ref 19.6–45.3)
MCH RBC QN AUTO: 30.1 PG (ref 26.6–33)
MCHC RBC AUTO-ENTMCNC: 34.2 G/DL (ref 31.5–35.7)
MCV RBC AUTO: 88 FL (ref 79–97)
MONOCYTES # BLD AUTO: 0.67 10*3/MM3 (ref 0.1–0.9)
MONOCYTES NFR BLD AUTO: 6.9 % (ref 5–12)
NEUTROPHILS NFR BLD AUTO: 6.32 10*3/MM3 (ref 1.7–7)
NEUTROPHILS NFR BLD AUTO: 64.8 % (ref 42.7–76)
NITRITE UR QL STRIP: NEGATIVE
NRBC BLD AUTO-RTO: 0 /100 WBC (ref 0–0.2)
PH UR STRIP.AUTO: 8 [PH] (ref 5–8)
PLATELET # BLD AUTO: 227 10*3/MM3 (ref 140–450)
PMV BLD AUTO: 12.1 FL (ref 6–12)
POTASSIUM SERPL-SCNC: 4 MMOL/L (ref 3.5–5.2)
PROT SERPL-MCNC: 7.1 G/DL (ref 6–8.5)
PROT UR QL STRIP: NEGATIVE
RBC # BLD AUTO: 4.59 10*6/MM3 (ref 3.77–5.28)
SODIUM SERPL-SCNC: 141 MMOL/L (ref 136–145)
SP GR UR STRIP: 1.01 (ref 1–1.03)
T VAGINALIS DNA VAG QL PROBE+SIG AMP: NEGATIVE
UROBILINOGEN UR QL STRIP: NORMAL
VIT B12 BLD-MCNC: 212 PG/ML (ref 211–946)
WBC NRBC COR # BLD: 9.74 10*3/MM3 (ref 3.4–10.8)

## 2023-10-18 PROCEDURE — 85025 COMPLETE CBC W/AUTO DIFF WBC: CPT | Performed by: FAMILY MEDICINE

## 2023-10-18 PROCEDURE — 87480 CANDIDA DNA DIR PROBE: CPT | Performed by: FAMILY MEDICINE

## 2023-10-18 PROCEDURE — 82607 VITAMIN B-12: CPT | Performed by: FAMILY MEDICINE

## 2023-10-18 PROCEDURE — 82306 VITAMIN D 25 HYDROXY: CPT | Performed by: FAMILY MEDICINE

## 2023-10-18 PROCEDURE — 87660 TRICHOMONAS VAGIN DIR PROBE: CPT | Performed by: FAMILY MEDICINE

## 2023-10-18 PROCEDURE — 87510 GARDNER VAG DNA DIR PROBE: CPT | Performed by: FAMILY MEDICINE

## 2023-10-18 PROCEDURE — 80053 COMPREHEN METABOLIC PANEL: CPT | Performed by: FAMILY MEDICINE

## 2023-10-18 PROCEDURE — 81003 URINALYSIS AUTO W/O SCOPE: CPT | Performed by: FAMILY MEDICINE

## 2023-10-18 PROCEDURE — 83036 HEMOGLOBIN GLYCOSYLATED A1C: CPT | Performed by: FAMILY MEDICINE

## 2023-10-18 RX ORDER — BUSPIRONE HYDROCHLORIDE 5 MG/1
5 TABLET ORAL 3 TIMES DAILY
Qty: 90 TABLET | Refills: 1 | Status: SHIPPED | OUTPATIENT
Start: 2023-10-18

## 2023-10-18 NOTE — PROGRESS NOTES
Chief Complaint  Hypertension (3 month follow up ) and Vaginal Itching (PT has had some vaginal itching x1 month, PT has used over the counter femiclear but had no improvement /)    Subjective        Maria Ines Garcia presents to [facility]  Hypertension    Vaginal Itching      Patient presents today with vaginal itching going on for the past month.  I did review her previous lab work.  FSH was noted to be in the menopausal range at 78.90.  She does report having a new sexual partner.  She does report some discharge as well.  We discussed further evaluation today checking a vaginal swab.  She has also noted a strong odor to her urine but denies any burning with urination or urinary frequency.  I did discuss with her checking a urinalysis as well.  Blood pressure has been adequately controlled taking bisoprolol 5 mg daily.  We discussed continuing current management.  We discussed her diagnosis of craniosynostosis.  She recently had a CT. She did have surgery before the age of 1 back in 1974 for a coronal suture that had fused on the right side.  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  Based on discussion today we discussed these findings are postsurgical.  Patient is not having any discomfort in this area.  We discussed that unless there is any change such as she feels that the area is more sunken and that we will hold off on doing further CTs.  She is no longer using Wegovy to help with weight loss.  She reports she did not like the way it made her feel.  She has gained some weight but reports that she will work on lifestyle changes including diet and exercise.  She is due for labs so we discussed getting these done including checking B12, vitamin D as well as checking her A1c.  She has previously been noted to have a slightly elevated glucose level, most recently this was 111.  She is  "requesting something to help out with anxiety.  We did discuss options today.  We discussed a trial of BuSpar 5 mg 3 times a day as needed.  She is due for flu vaccination so we discussed getting this done.  Objective   Vital Signs:  /78   Pulse 84   Temp 97.7 °F (36.5 °C)   Ht 162.6 cm (64\")   Wt 87.7 kg (193 lb 6.4 oz)   SpO2 100%   BMI 33.20 kg/m²   Estimated body mass index is 33.2 kg/m² as calculated from the following:    Height as of this encounter: 162.6 cm (64\").    Weight as of this encounter: 87.7 kg (193 lb 6.4 oz).       BMI is >= 30 and <35. (Class 1 Obesity). The following options were offered after discussion;: exercise counseling/recommendations and nutrition counseling/recommendations      Physical Exam  Vitals reviewed.   Constitutional:       Appearance: Normal appearance.   HENT:      Head: Normocephalic and atraumatic.      Mouth/Throat:      Pharynx: No oropharyngeal exudate.   Eyes:      Conjunctiva/sclera: Conjunctivae normal.   Cardiovascular:      Rate and Rhythm: Normal rate and regular rhythm.      Heart sounds: No murmur heard.     No friction rub. No gallop.   Pulmonary:      Effort: Pulmonary effort is normal.      Breath sounds: Normal breath sounds. No wheezing or rhonchi.   Abdominal:      General: Bowel sounds are normal. There is no distension.      Palpations: Abdomen is soft.      Tenderness: There is no abdominal tenderness.   Skin:     General: Skin is warm and dry.   Neurological:      Mental Status: She is alert and oriented to person, place, and time.      Cranial Nerves: No cranial nerve deficit.   Psychiatric:         Mood and Affect: Mood and affect normal.         Behavior: Behavior normal.         Thought Content: Thought content normal.         Judgment: Judgment normal.        Result Review :                   Assessment and Plan   Diagnoses and all orders for this visit:    1. Primary hypertension (Primary)  -     CBC & Differential  -     Comprehensive " Metabolic Panel    2. Vaginal itching  -     Gardnerella vaginalis, Trichomonas vaginalis, Candida albicans, DNA - Swab, Vagina    3. Vaginal discharge    4. Malodorous urine  -     Urinalysis With Culture If Indicated - Urine, Clean Catch  -     [place] Urine Culture Tube - Urine, Clean Catch    5. Need for influenza vaccination  -     Fluzone (or Fluarix & Flulaval for VFC) >6mos    6. Muenke syndrome    7. Craniosynostosis, unspecified laterality, unspecified type    8. Obesity (BMI 30-39.9)    9. B12 deficiency  -     Vitamin B12    10. Vitamin D deficiency  -     Vitamin D,25-Hydroxy    11. Anxiety    12. Medication monitoring encounter  -     CBC & Differential  -     Comprehensive Metabolic Panel  -     Vitamin B12  -     Vitamin D,25-Hydroxy  -     Hemoglobin A1c    13. Abnormal glucose  -     Hemoglobin A1c    14. Premenstrual dysphoric disorder    Other orders  -     busPIRone (BUSPAR) 5 MG tablet; Take 1 tablet by mouth 3 (Three) Times a Day.  Dispense: 90 tablet; Refill: 1    Plan as documented above.  I discussed with patient getting labs drawn today.  Plan to check a vaginal swab as well as a urinalysis.  Further recommendations to follow once these results return.  Plan to continue current management for hypertension at this time.  I also discussed with her adding BuSpar 5 mg up to 3 times a day to help out with anxiety.  She will continue taking Wellbutrin to help her vasomotor symptoms as well as premenstrual dysphoric symptoms.         Follow Up   Return in about 3 months (around 1/18/2024) for hypertension.  Patient was given instructions and counseling regarding her condition or for health maintenance advice. Please see specific information pulled into the AVS if appropriate.

## 2023-10-19 ENCOUNTER — TELEPHONE (OUTPATIENT)
Dept: FAMILY MEDICINE CLINIC | Facility: CLINIC | Age: 48
End: 2023-10-19
Payer: COMMERCIAL

## 2023-10-19 RX ORDER — LANOLIN ALCOHOL/MO/W.PET/CERES
1000 CREAM (GRAM) TOPICAL DAILY
Qty: 90 TABLET | Refills: 1 | Status: SHIPPED | OUTPATIENT
Start: 2023-10-19 | End: 2023-10-19 | Stop reason: SDUPTHER

## 2023-10-19 RX ORDER — ERGOCALCIFEROL 1.25 MG/1
50000 CAPSULE ORAL WEEKLY
Qty: 13 CAPSULE | Refills: 3 | Status: SHIPPED | OUTPATIENT
Start: 2023-10-19 | End: 2023-10-19 | Stop reason: SDUPTHER

## 2023-10-19 RX ORDER — FLUCONAZOLE 150 MG/1
TABLET ORAL
Qty: 2 TABLET | Refills: 0 | Status: SHIPPED | OUTPATIENT
Start: 2023-10-19 | End: 2023-10-19 | Stop reason: SDUPTHER

## 2023-10-19 RX ORDER — FLUCONAZOLE 150 MG/1
TABLET ORAL
Qty: 2 TABLET | Refills: 0 | Status: SHIPPED | OUTPATIENT
Start: 2023-10-19

## 2023-10-19 RX ORDER — ERGOCALCIFEROL 1.25 MG/1
50000 CAPSULE ORAL WEEKLY
Qty: 13 CAPSULE | Refills: 3 | Status: SHIPPED | OUTPATIENT
Start: 2023-10-19

## 2023-10-19 RX ORDER — LANOLIN ALCOHOL/MO/W.PET/CERES
1000 CREAM (GRAM) TOPICAL DAILY
Qty: 90 TABLET | Refills: 1 | Status: SHIPPED | OUTPATIENT
Start: 2023-10-19

## 2023-10-30 ENCOUNTER — PATIENT MESSAGE (OUTPATIENT)
Dept: FAMILY MEDICINE CLINIC | Facility: CLINIC | Age: 48
End: 2023-10-30
Payer: COMMERCIAL

## 2023-10-30 DIAGNOSIS — N89.8 VAGINAL ITCHING: Primary | ICD-10-CM

## 2023-11-01 ENCOUNTER — CLINICAL SUPPORT (OUTPATIENT)
Dept: FAMILY MEDICINE CLINIC | Facility: CLINIC | Age: 48
End: 2023-11-01
Payer: COMMERCIAL

## 2023-11-01 DIAGNOSIS — N89.8 VAGINAL ITCHING: ICD-10-CM

## 2023-11-01 LAB
CANDIDA SPECIES: NEGATIVE
GARDNERELLA VAGINALIS: NEGATIVE
T VAGINALIS DNA VAG QL PROBE+SIG AMP: NEGATIVE

## 2023-11-01 PROCEDURE — 87510 GARDNER VAG DNA DIR PROBE: CPT | Performed by: FAMILY MEDICINE

## 2023-11-01 PROCEDURE — 87480 CANDIDA DNA DIR PROBE: CPT | Performed by: FAMILY MEDICINE

## 2023-11-01 PROCEDURE — 87660 TRICHOMONAS VAGIN DIR PROBE: CPT | Performed by: FAMILY MEDICINE

## 2023-11-20 RX ORDER — BISOPROLOL FUMARATE 5 MG/1
5 TABLET, FILM COATED ORAL DAILY
Qty: 90 TABLET | Refills: 3 | Status: SHIPPED | OUTPATIENT
Start: 2023-11-20

## 2024-01-18 ENCOUNTER — OFFICE VISIT (OUTPATIENT)
Dept: FAMILY MEDICINE CLINIC | Facility: CLINIC | Age: 49
End: 2024-01-18
Payer: COMMERCIAL

## 2024-01-18 VITALS
BODY MASS INDEX: 32.73 KG/M2 | OXYGEN SATURATION: 97 % | HEIGHT: 64 IN | HEART RATE: 73 BPM | WEIGHT: 191.7 LBS | TEMPERATURE: 97.9 F | SYSTOLIC BLOOD PRESSURE: 118 MMHG | DIASTOLIC BLOOD PRESSURE: 68 MMHG

## 2024-01-18 DIAGNOSIS — E53.8 B12 DEFICIENCY: ICD-10-CM

## 2024-01-18 DIAGNOSIS — Z51.81 MEDICATION MONITORING ENCOUNTER: ICD-10-CM

## 2024-01-18 DIAGNOSIS — Z12.11 SCREENING FOR COLON CANCER: ICD-10-CM

## 2024-01-18 DIAGNOSIS — E55.9 VITAMIN D DEFICIENCY: ICD-10-CM

## 2024-01-18 DIAGNOSIS — I10 PRIMARY HYPERTENSION: Primary | ICD-10-CM

## 2024-01-18 DIAGNOSIS — R73.03 PREDIABETES: ICD-10-CM

## 2024-01-18 DIAGNOSIS — N95.1 VASOMOTOR SYMPTOMS DUE TO MENOPAUSE: ICD-10-CM

## 2024-01-18 DIAGNOSIS — F32.81 PREMENSTRUAL DYSPHORIC DISORDER: ICD-10-CM

## 2024-01-18 DIAGNOSIS — F41.9 ANXIETY: ICD-10-CM

## 2024-01-18 NOTE — PROGRESS NOTES
"Chief Complaint  Hypertension      Subjective        Maria Ines Garcia presents to [facility]  History of Present Illness  Patient presents today to follow-up for hypertension.  Blood pressure has been adequately controlled taking bisoprolol 5 mg daily.  She is now on vitamin D and B12 supplementation based on previous deficiencies noted in her lab work.  Her B12 level was borderline low at 212.  Vitamin D level was low at 17.3.  She does report feeling better since starting these medications.  She will have following up with cardiology soon given family history of coronary artery disease.  She will have a lipid panel done as well.  She continues to take Crestor 5 mg daily.  She continues to take Wellbutrin which was previously prescribed by her OB/GYN for premenstrual dysphoric disorder as well as hot flashes.  As far as anxiety is concerned she took the BuSpar however it caused her to feel confused.  She did not like the way it made her feel.  She discontinued it on her own.  She is not interested in any counseling or other medication at this time.  She is due for colonoscopy but would like to hold off on getting this scheduled at this time.  Objective   Vital Signs:  /68 (BP Location: Left arm, Patient Position: Sitting)   Pulse 73   Temp 97.9 °F (36.6 °C) (Oral)   Ht 162.6 cm (64\")   Wt 87 kg (191 lb 11.2 oz)   SpO2 97%   BMI 32.91 kg/m²   Estimated body mass index is 32.91 kg/m² as calculated from the following:    Height as of this encounter: 162.6 cm (64\").    Weight as of this encounter: 87 kg (191 lb 11.2 oz).               Physical Exam  Vitals reviewed.   Constitutional:       Appearance: Normal appearance.   HENT:      Head: Normocephalic and atraumatic.      Right Ear: External ear normal.      Left Ear: External ear normal.      Nose: Nose normal.   Eyes:      Conjunctiva/sclera: Conjunctivae normal.   Cardiovascular:      Rate and Rhythm: Normal rate and " regular rhythm.      Heart sounds: No murmur heard.     No friction rub. No gallop.   Pulmonary:      Effort: Pulmonary effort is normal.      Breath sounds: Normal breath sounds. No wheezing or rhonchi.   Abdominal:      General: Bowel sounds are normal. There is no distension.      Palpations: Abdomen is soft.      Tenderness: There is no abdominal tenderness.   Skin:     General: Skin is warm and dry.   Neurological:      Mental Status: She is alert and oriented to person, place, and time.      Cranial Nerves: No cranial nerve deficit.   Psychiatric:         Mood and Affect: Mood and affect normal.         Behavior: Behavior normal.         Thought Content: Thought content normal.         Judgment: Judgment normal.        Result Review :                     Assessment and Plan     Diagnoses and all orders for this visit:    1. Primary hypertension (Primary)    2. Anxiety    3. Premenstrual dysphoric disorder    4. Medication monitoring encounter  -     Vitamin D,25-Hydroxy; Future  -     Vitamin B12; Future  -     CBC & Differential; Future    5. Vitamin D deficiency  -     Vitamin D,25-Hydroxy; Future    6. B12 deficiency  -     Vitamin B12; Future    7. Prediabetes  -     Hemoglobin A1c; Future    8. Screening for colon cancer    9. Vasomotor symptoms due to menopause    Patient overall doing well at this time.  We did discuss monitoring anxiety at this time.  She is not interested in any further medication at this time.  We did discuss continuing with Wellbutrin at this time which has helped out with her hot flashes as well as premenstrual dysphoric disorder.  This was previously prescribed by her OB/GYN.  Plan to have labs updated at her convenience.  I discussed seeing her back in 6 months or sooner if needed.  Patient instructed to call with any questions or concerns.         Follow Up     Return in about 6 months (around 7/18/2024) for hypertension.  Patient was given instructions and counseling regarding  her condition or for health maintenance advice. Please see specific information pulled into the AVS if appropriate.

## 2024-02-09 ENCOUNTER — CLINICAL SUPPORT (OUTPATIENT)
Dept: FAMILY MEDICINE CLINIC | Facility: CLINIC | Age: 49
End: 2024-02-09
Payer: COMMERCIAL

## 2024-02-09 DIAGNOSIS — R73.03 PREDIABETES: ICD-10-CM

## 2024-02-09 DIAGNOSIS — Z51.81 MEDICATION MONITORING ENCOUNTER: ICD-10-CM

## 2024-02-09 DIAGNOSIS — E53.8 B12 DEFICIENCY: ICD-10-CM

## 2024-02-09 DIAGNOSIS — E78.5 HYPERLIPIDEMIA LDL GOAL <100: ICD-10-CM

## 2024-02-09 DIAGNOSIS — E55.9 VITAMIN D DEFICIENCY: ICD-10-CM

## 2024-02-09 LAB
25(OH)D3 SERPL-MCNC: 38.3 NG/ML (ref 30–100)
ALBUMIN SERPL-MCNC: 4.9 G/DL (ref 3.5–5.2)
ALBUMIN/GLOB SERPL: 2.2 G/DL
ALP SERPL-CCNC: 91 U/L (ref 39–117)
ALT SERPL W P-5'-P-CCNC: 15 U/L (ref 1–33)
ANION GAP SERPL CALCULATED.3IONS-SCNC: 11 MMOL/L (ref 5–15)
AST SERPL-CCNC: 18 U/L (ref 1–32)
BASOPHILS # BLD AUTO: 0.04 10*3/MM3 (ref 0–0.2)
BASOPHILS NFR BLD AUTO: 0.5 % (ref 0–1.5)
BILIRUB SERPL-MCNC: 0.3 MG/DL (ref 0–1.2)
BUN SERPL-MCNC: 10 MG/DL (ref 6–20)
BUN/CREAT SERPL: 11 (ref 7–25)
CALCIUM SPEC-SCNC: 9.9 MG/DL (ref 8.6–10.5)
CHLORIDE SERPL-SCNC: 105 MMOL/L (ref 98–107)
CHOLEST SERPL-MCNC: 199 MG/DL (ref 0–200)
CO2 SERPL-SCNC: 26 MMOL/L (ref 22–29)
CREAT SERPL-MCNC: 0.91 MG/DL (ref 0.57–1)
DEPRECATED RDW RBC AUTO: 37.8 FL (ref 37–54)
EGFRCR SERPLBLD CKD-EPI 2021: 77 ML/MIN/1.73
EOSINOPHIL # BLD AUTO: 0.12 10*3/MM3 (ref 0–0.4)
EOSINOPHIL NFR BLD AUTO: 1.5 % (ref 0.3–6.2)
ERYTHROCYTE [DISTWIDTH] IN BLOOD BY AUTOMATED COUNT: 12 % (ref 12.3–15.4)
GLOBULIN UR ELPH-MCNC: 2.2 GM/DL
GLUCOSE SERPL-MCNC: 110 MG/DL (ref 65–99)
HBA1C MFR BLD: 6 % (ref 4.8–5.6)
HCT VFR BLD AUTO: 42.6 % (ref 34–46.6)
HDLC SERPL-MCNC: 58 MG/DL (ref 40–60)
HGB BLD-MCNC: 14.5 G/DL (ref 12–15.9)
IMM GRANULOCYTES # BLD AUTO: 0.02 10*3/MM3 (ref 0–0.05)
IMM GRANULOCYTES NFR BLD AUTO: 0.3 % (ref 0–0.5)
LDLC SERPL CALC-MCNC: 103 MG/DL (ref 0–100)
LDLC/HDLC SERPL: 1.67 {RATIO}
LYMPHOCYTES # BLD AUTO: 1.73 10*3/MM3 (ref 0.7–3.1)
LYMPHOCYTES NFR BLD AUTO: 22.2 % (ref 19.6–45.3)
MCH RBC QN AUTO: 29.5 PG (ref 26.6–33)
MCHC RBC AUTO-ENTMCNC: 34 G/DL (ref 31.5–35.7)
MCV RBC AUTO: 86.8 FL (ref 79–97)
MONOCYTES # BLD AUTO: 0.41 10*3/MM3 (ref 0.1–0.9)
MONOCYTES NFR BLD AUTO: 5.2 % (ref 5–12)
NEUTROPHILS NFR BLD AUTO: 5.49 10*3/MM3 (ref 1.7–7)
NEUTROPHILS NFR BLD AUTO: 70.3 % (ref 42.7–76)
NRBC BLD AUTO-RTO: 0 /100 WBC (ref 0–0.2)
PLATELET # BLD AUTO: 206 10*3/MM3 (ref 140–450)
PMV BLD AUTO: 12.7 FL (ref 6–12)
POTASSIUM SERPL-SCNC: 4.8 MMOL/L (ref 3.5–5.2)
PROT SERPL-MCNC: 7.1 G/DL (ref 6–8.5)
RBC # BLD AUTO: 4.91 10*6/MM3 (ref 3.77–5.28)
SODIUM SERPL-SCNC: 142 MMOL/L (ref 136–145)
TRIGL SERPL-MCNC: 222 MG/DL (ref 0–150)
VIT B12 BLD-MCNC: 890 PG/ML (ref 211–946)
VLDLC SERPL-MCNC: 38 MG/DL (ref 5–40)
WBC NRBC COR # BLD AUTO: 7.81 10*3/MM3 (ref 3.4–10.8)

## 2024-02-09 PROCEDURE — 80053 COMPREHEN METABOLIC PANEL: CPT

## 2024-02-09 PROCEDURE — 36415 COLL VENOUS BLD VENIPUNCTURE: CPT | Performed by: FAMILY MEDICINE

## 2024-02-09 PROCEDURE — 82607 VITAMIN B-12: CPT | Performed by: FAMILY MEDICINE

## 2024-02-09 PROCEDURE — 83036 HEMOGLOBIN GLYCOSYLATED A1C: CPT | Performed by: FAMILY MEDICINE

## 2024-02-09 PROCEDURE — 80061 LIPID PANEL: CPT

## 2024-02-09 PROCEDURE — 82306 VITAMIN D 25 HYDROXY: CPT | Performed by: FAMILY MEDICINE

## 2024-02-09 PROCEDURE — 85025 COMPLETE CBC W/AUTO DIFF WBC: CPT | Performed by: FAMILY MEDICINE

## 2024-06-03 ENCOUNTER — PROCEDURE VISIT (OUTPATIENT)
Dept: OBSTETRICS AND GYNECOLOGY | Facility: CLINIC | Age: 49
End: 2024-06-03
Payer: COMMERCIAL

## 2024-06-03 ENCOUNTER — OFFICE VISIT (OUTPATIENT)
Dept: OBSTETRICS AND GYNECOLOGY | Facility: CLINIC | Age: 49
End: 2024-06-03
Payer: COMMERCIAL

## 2024-06-03 VITALS
HEIGHT: 64 IN | DIASTOLIC BLOOD PRESSURE: 71 MMHG | SYSTOLIC BLOOD PRESSURE: 103 MMHG | BODY MASS INDEX: 31.92 KG/M2 | WEIGHT: 187 LBS

## 2024-06-03 DIAGNOSIS — Z12.31 VISIT FOR SCREENING MAMMOGRAM: Primary | ICD-10-CM

## 2024-06-03 DIAGNOSIS — Z01.419 VISIT FOR GYNECOLOGIC EXAMINATION: Primary | ICD-10-CM

## 2024-06-03 DIAGNOSIS — N95.1 MENOPAUSAL SYMPTOMS: ICD-10-CM

## 2024-06-03 PROCEDURE — 99459 PELVIC EXAMINATION: CPT | Performed by: OBSTETRICS & GYNECOLOGY

## 2024-06-03 PROCEDURE — 77067 SCR MAMMO BI INCL CAD: CPT | Performed by: OBSTETRICS & GYNECOLOGY

## 2024-06-03 PROCEDURE — 99396 PREV VISIT EST AGE 40-64: CPT | Performed by: OBSTETRICS & GYNECOLOGY

## 2024-06-03 PROCEDURE — 77063 BREAST TOMOSYNTHESIS BI: CPT | Performed by: OBSTETRICS & GYNECOLOGY

## 2024-06-03 RX ORDER — DOXYCYCLINE 50 MG/1
TABLET ORAL
COMMUNITY
Start: 2024-05-15

## 2024-06-03 RX ORDER — BUPROPION HYDROCHLORIDE 150 MG/1
150 TABLET ORAL EVERY MORNING
Qty: 90 TABLET | Refills: 3 | Status: SHIPPED | OUTPATIENT
Start: 2024-06-03 | End: 2025-06-03

## 2024-06-03 NOTE — PROGRESS NOTES
[facility]  [address], [address]  [place], [place] [address]  Phone: [phone] / Fax:  [phone]      2024    [address] [facility] [place] [address]    Lennox Tran DO    Chief Complaint   Patient presents with    Gynecologic Exam     Annual Exam,last pap 4-29-21 NL HPV (-). Mammogram today, previous 5--23- NL.. Patient has not had a colonoscopy, she does have a referral for an appointment to be made. Patient with hot flashes that she just began having in the past few weeks.       Maria Ines Garcia is here for annual gynecologic exam.  HPI - Patient with normal pap 3 years ago.  She had a normal mammogram one year ago and underwent screening again today.  She is in process of arranging a colonoscopy.  Patient reports that Wellbutrin works for night sweats and hot flashes.  She requests refills.    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    D & C HYSTEROSCOPY N/A 2019    Procedure: AND MYOSURE;  Surgeon: Mukund Castro MD;  Location: [facility] OR;  Service: Obstetrics/Gynecology    D & C HYSTEROSCOPY ENDOMETRIAL ABLATION N/A 2019    Procedure: DILATATION AND CURETTAGE HYSTEROSCOPY WITH NOVASURE ENDOMETRIAL ABLATION;  Surgeon: Mukund Castro MD;  Location: [facility] OR;  Service: Obstetrics/Gynecology    TONSILLECTOMY AND ADENOIDECTOMY      TUBAL ABDOMINAL LIGATION Bilateral        No Known Allergies    Social History     Socioeconomic History    Marital status:  "   Tobacco Use    Smoking status: Former     Current packs/day: 0.00     Average packs/day: 1.5 packs/day for 19.4 years (29.1 ttl pk-yrs)     Types: Cigarettes     Start date: 8/3/1989     Quit date: 2009     Years since quitting: 15.4    Smokeless tobacco: Never   Vaping Use    Vaping status: Never Used   Substance and Sexual Activity    Alcohol use: Yes     Comment: once per week or less    Drug use: Yes     Types: Marijuana    Sexual activity: Not Currently     Partners: Male     Birth control/protection: Post-menopausal, Tubal ligation, Surgical       Family History   Problem Relation Age of Onset    Emphysema Father     COPD Father     Heart disease Mother     Hypertension Mother     Diabetes Mother     Arrhythmia Maternal Grandmother     Heart disease Paternal Uncle     Malig Hyperthermia Neg Hx     Breast cancer Neg Hx     Ovarian cancer Neg Hx     Uterine cancer Neg Hx     Colon cancer Neg Hx        No LMP recorded. Patient has had an ablation.    OB History          3    Para        Term                AB   1    Living             SAB   1    IAB        Ectopic        Molar        Multiple        Live Births   2                Vitals:    24 1307   BP: 103/71   Weight: 84.8 kg (187 lb)   Height: 162.6 cm (64\")       Physical Exam  Constitutional:       Appearance: Normal appearance. She is well-developed.   Genitourinary:      Bladder and urethral meatus normal.      Right Labia: No tenderness or lesions.     Left Labia: No tenderness or lesions.     No vaginal discharge or tenderness.      Anterior and apical vaginal prolapse present.       Right Adnexa: not tender and not full.     Left Adnexa: not tender and not full.     No cervical motion tenderness or lesion.      Uterus is not enlarged or tender.      No urethral tenderness or hypermobility present.   Breasts:     Right: No mass or nipple discharge.      Left: No mass or nipple discharge.   HENT:      Right Ear: External " ear normal.      Left Ear: External ear normal.      Nose: Nose normal.   Eyes:      Conjunctiva/sclera: Conjunctivae normal.   Neck:      Thyroid: No thyromegaly.   Cardiovascular:      Rate and Rhythm: Normal rate and regular rhythm.      Heart sounds: Normal heart sounds.   Pulmonary:      Effort: Pulmonary effort is normal.      Breath sounds: No stridor. No wheezing.   Abdominal:      Palpations: Abdomen is soft.      Tenderness: There is no abdominal tenderness. There is no guarding or rebound.   Musculoskeletal:         General: Normal range of motion.      Cervical back: Normal range of motion and neck supple.   Neurological:      Mental Status: She is alert.      Coordination: Coordination normal.   Skin:     General: Skin is warm and dry.   Psychiatric:         Mood and Affect: Mood normal.         Behavior: Behavior normal.         Thought Content: Thought content normal.         Judgment: Judgment normal.   Vitals reviewed. Exam conducted with a chaperone present.         Diagnoses and all orders for this visit:    1. Visit for gynecologic examination (Primary)  -     IgP, Aptima HPV  -     Discussed importance of regular screening and breast awareness.  -     Patient with mild prolapse.  Recommended observation for now.      2. Menopausal symptoms  -     buPROPion XL (WELLBUTRIN XL) 150 MG 24 hr tablet; TAKE 1 TABLET BY MOUTH EVERY MORNING  Dispense: 90 tablet; Refill: 3  -     Discussed trying hormone replacement therapy but patient declines.        Mukund Castro MD

## 2024-06-07 LAB
CYTOLOGIST CVX/VAG CYTO: ABNORMAL
CYTOLOGY CVX/VAG DOC CYTO: ABNORMAL
CYTOLOGY CVX/VAG DOC THIN PREP: ABNORMAL
DX ICD CODE: ABNORMAL
DX ICD CODE: ABNORMAL
HPV I/H RISK 4 DNA CVX QL PROBE+SIG AMP: POSITIVE
Lab: ABNORMAL
OTHER STN SPEC: ABNORMAL
PATHOLOGIST CVX/VAG CYTO: ABNORMAL
RECOM F/U CVX/VAG CYTO: ABNORMAL
STAT OF ADQ CVX/VAG CYTO-IMP: ABNORMAL

## 2024-06-10 ENCOUNTER — TELEPHONE (OUTPATIENT)
Dept: OBSTETRICS AND GYNECOLOGY | Facility: CLINIC | Age: 49
End: 2024-06-10
Payer: COMMERCIAL

## 2024-06-10 NOTE — TELEPHONE ENCOUNTER
I spoke to the patient , she is aware of her pap results and the need for a Colposcopy. This is scheduled for Monday July1st. 6-10-24/lw    ----- Message from Mukund Castro sent at 6/8/2024 10:30 AM EDT -----  LAW - Pap was abnormal.  She will need colposcopy.  Thanks.

## 2024-07-01 ENCOUNTER — PROCEDURE VISIT (OUTPATIENT)
Dept: OBSTETRICS AND GYNECOLOGY | Facility: CLINIC | Age: 49
End: 2024-07-01
Payer: COMMERCIAL

## 2024-07-01 VITALS
SYSTOLIC BLOOD PRESSURE: 113 MMHG | HEIGHT: 64 IN | DIASTOLIC BLOOD PRESSURE: 71 MMHG | WEIGHT: 191 LBS | BODY MASS INDEX: 32.61 KG/M2

## 2024-07-01 DIAGNOSIS — R87.612 LGSIL ON PAP SMEAR OF CERVIX: Primary | ICD-10-CM

## 2024-07-01 NOTE — PROGRESS NOTES
Colposcopy Procedure Note    Indications: Pap smear 1 months ago showed: low-grade squamous intraepithelial neoplasia (LGSIL - encompassing HPV,mild dysplasia,FELI I). The prior pap showed no abnormalities.  Prior cervical/vaginal disease: unknown. Prior cervical treatment:  patient reports previous conization in 1999 .    Procedure Details   The risks and benefits of the procedure and Verbal informed consent obtained.    Speculum placed in vagina and excellent visualization of cervix achieved, cervix swabbed x 3 with acetic acid solution.    Findings:  Cervix: no visible lesions; endocervical curettage performed.  Vaginal inspection: normal without visible lesions.  Vulvar colposcopy: vulvar colposcopy not performed.    Specimens: endocervical curettage.    Complications: none.  Patient tolerated the procedure well without complications.    Plan:  Specimens labelled and sent to Pathology.  Will base further treatment on Pathology findings.  Likely follow up in one year.    Mukund Castro MD

## 2024-07-05 RX ORDER — TRIAMCINOLONE ACETONIDE 1 MG/G
1 OINTMENT TOPICAL 2 TIMES DAILY
Qty: 30 G | Refills: 0 | Status: SHIPPED | OUTPATIENT
Start: 2024-07-05

## 2024-07-09 ENCOUNTER — TELEPHONE (OUTPATIENT)
Dept: OBSTETRICS AND GYNECOLOGY | Facility: CLINIC | Age: 49
End: 2024-07-09
Payer: COMMERCIAL

## 2024-07-09 NOTE — TELEPHONE ENCOUNTER
Patient aware of stable testing and to follow up in one year with  repeat pap. 7-10-24/lw    I tried calling the patient there was no answer. I left a message requesting a return call. 7-9-24/lw    ----- Message from Mukund Castro sent at 7/7/2024  7:37 PM EDT -----  LAW - Let her know that her testing was stable.  She can follow up in one year with repeat pap.

## 2024-08-07 RX ORDER — ROSUVASTATIN CALCIUM 5 MG/1
5 TABLET, COATED ORAL DAILY
Qty: 30 TABLET | Refills: 5 | Status: SHIPPED | OUTPATIENT
Start: 2024-08-07

## 2024-09-18 ENCOUNTER — OFFICE VISIT (OUTPATIENT)
Dept: CARDIOLOGY | Facility: CLINIC | Age: 49
End: 2024-09-18
Payer: COMMERCIAL

## 2024-09-18 VITALS
HEIGHT: 64 IN | WEIGHT: 190 LBS | BODY MASS INDEX: 32.44 KG/M2 | HEART RATE: 70 BPM | DIASTOLIC BLOOD PRESSURE: 80 MMHG | SYSTOLIC BLOOD PRESSURE: 119 MMHG

## 2024-09-18 DIAGNOSIS — E66.09 CLASS 1 OBESITY DUE TO EXCESS CALORIES WITHOUT SERIOUS COMORBIDITY WITH BODY MASS INDEX (BMI) OF 30.0 TO 30.9 IN ADULT: ICD-10-CM

## 2024-09-18 DIAGNOSIS — R07.9 CHEST PAIN, UNSPECIFIED TYPE: Primary | ICD-10-CM

## 2024-09-18 DIAGNOSIS — R03.0 BORDERLINE SYSTOLIC HTN: ICD-10-CM

## 2024-09-18 PROCEDURE — 99213 OFFICE O/P EST LOW 20 MIN: CPT | Performed by: INTERNAL MEDICINE

## 2024-09-18 PROCEDURE — 93000 ELECTROCARDIOGRAM COMPLETE: CPT | Performed by: INTERNAL MEDICINE

## 2024-10-23 ENCOUNTER — HOSPITAL ENCOUNTER (EMERGENCY)
Facility: HOSPITAL | Age: 49
Discharge: HOME | End: 2024-10-23
Attending: EMERGENCY MEDICINE | Admitting: EMERGENCY MEDICINE
Payer: COMMERCIAL

## 2024-10-23 VITALS
RESPIRATION RATE: 18 BRPM | BODY MASS INDEX: 33.29 KG/M2 | HEART RATE: 66 BPM | SYSTOLIC BLOOD PRESSURE: 129 MMHG | HEIGHT: 64 IN | OXYGEN SATURATION: 98 % | TEMPERATURE: 97.9 F | DIASTOLIC BLOOD PRESSURE: 74 MMHG | WEIGHT: 195 LBS

## 2024-10-23 DIAGNOSIS — M25.471 RIGHT ANKLE SWELLING: ICD-10-CM

## 2024-10-23 DIAGNOSIS — M25.571 ACUTE RIGHT ANKLE PAIN: Primary | ICD-10-CM

## 2024-10-23 DIAGNOSIS — S93.401A SPRAIN OF RIGHT ANKLE, UNSPECIFIED LIGAMENT, INITIAL ENCOUNTER: ICD-10-CM

## 2024-10-23 PROCEDURE — 25010000002 KETOROLAC TROMETHAMINE PER 15 MG

## 2024-10-23 PROCEDURE — 96372 THER/PROPH/DIAG INJ SC/IM: CPT

## 2024-10-23 PROCEDURE — 99282 EMERGENCY DEPT VISIT SF MDM: CPT

## 2024-10-23 RX ORDER — PREDNISONE 20 MG/1
TABLET ORAL
Qty: 18 TABLET | Refills: 0 | Status: SHIPPED | OUTPATIENT
Start: 2024-10-23 | End: 2024-11-01

## 2024-10-23 RX ORDER — PREDNISONE 20 MG/1
TABLET ORAL
Qty: 18 TABLET | Refills: 0 | Status: SHIPPED | OUTPATIENT
Start: 2024-10-23 | End: 2024-10-23

## 2024-10-23 RX ORDER — KETOROLAC TROMETHAMINE 30 MG/ML
60 INJECTION, SOLUTION INTRAMUSCULAR; INTRAVENOUS ONCE
Status: COMPLETED | OUTPATIENT
Start: 2024-10-23 | End: 2024-10-23

## 2024-10-23 RX ADMIN — KETOROLAC TROMETHAMINE 60 MG: 30 INJECTION, SOLUTION INTRAMUSCULAR at 12:30

## 2024-10-23 NOTE — ED PROVIDER NOTES
Time: 12:06 PM EDT  Date of encounter:  10/23/2024  Room number: 65/65  Independent Historian/Clinical History and Information was obtained by:   Patient    History is limited by: N/A    Chief Complaint: foot pain       History of Present Illness:  Patient is a 49 y.o. year old female who presents to the emergency department for evaluation of right foot pain after falling off of a ladder last Saturday, 10 days ago.  Patient was seen at an outside facility, at an urgent care, on the  and had an x-ray completed and showed no fracture or dislocation.  She was also provided an Ortho boot.  Patient states however she has not been wearing the boot because it has been causing her to have an increase in pain.  She admits that she was told to follow-up with her primary care provider but she opted not to thinking that it would get better.  She presents today with continued swelling and tenderness to the lateral side of right ankle.    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,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    D & C HYSTEROSCOPY N/A 2019    Procedure: AND MYOSURE;  Surgeon: Mukund Castro MD;  Location: [facility];  Service: Obstetrics/Gynecology    D & C HYSTEROSCOPY ENDOMETRIAL ABLATION N/A 2019    Procedure: DILATATION AND CURETTAGE HYSTEROSCOPY WITH NOVASURE ENDOMETRIAL ABLATION;  Surgeon:  Mukund Castro MD;  Location: [facility] MAIN OR;  Service: Obstetrics/Gynecology    DILATATION AND CURETTAGE      [facility]    TONSILLECTOMY AND ADENOIDECTOMY  1978    TUBAL ABDOMINAL LIGATION Bilateral 1999     Family History   Problem Relation Age of Onset    Emphysema Father     COPD Father     Heart disease Mother     Hypertension Mother     Diabetes Mother     Arrhythmia Maternal Grandmother     Heart disease Paternal Uncle     Malig Hyperthermia Neg Hx     Breast cancer Neg Hx     Ovarian cancer Neg Hx     Uterine cancer Neg Hx     Colon cancer Neg Hx        Home Medications:  Prior to Admission medications    Medication Sig Start Date End Date Taking? Authorizing Provider   aspirin 81 MG EC tablet Take 1 tablet by mouth Daily.    Provider, MD Stefani   bisoprolol (ZEBeta) 5 MG tablet TAKE 1 TABLET BY MOUTH DAILY 11/20/23   Mae Espino MD   buPROPion XL (WELLBUTRIN XL) 150 MG 24 hr tablet TAKE 1 TABLET BY MOUTH EVERY MORNING 6/3/24 6/3/25  Mukund Castro MD   clindamycin (CLEOCIN T) 1 % external solution Apply to affected area once daily 6/13/24      doxycycline (MONODOX) 50 MG capsule Take 1 capsule by mouth Daily. 6/13/24      Fexofenadine HCl (ALLERGY 24-HR PO) Take 1 tablet by mouth Every Morning.    Provider, MD Stefani   rosuvastatin (CRESTOR) 5 MG tablet Take 1 tablet by mouth Daily. 8/7/24   Mae Espino MD   triamcinolone (KENALOG) 0.1 % ointment Apply 1 Application topically to the appropriate area as directed 2 (Two) Times a Day. 7/5/24   Otis Mayfield, DO   vitamin B-12 (CYANOCOBALAMIN) 1000 MCG tablet Take 1 tablet by mouth Daily. 10/19/23   Lennox Tran DO   vitamin D (ERGOCALCIFEROL) 1.25 MG (53017 UT) capsule capsule Take 1 capsule by mouth 1 (One) Time Per Week. 10/19/23   Lennox Tran DO        Social History:   Social History     Tobacco Use    Smoking status: Former     Current packs/day: 0.00     Average packs/day: 1.5 packs/day  "for 19.4 years (29.1 ttl pk-yrs)     Types: Cigarettes     Start date: 8/3/1989     Quit date: 1/1/2009     Years since quitting: 15.8     Passive exposure: Never    Smokeless tobacco: Never   Vaping Use    Vaping status: Never Used   Substance Use Topics    Alcohol use: Yes     Comment: once per week or less    Drug use: Never         Review of Systems:  Review of Systems     I performed a review of systems today, which was all negative, except for the positives found in the HPI above.    Physical Exam:  /74 (BP Location: Left arm, Patient Position: Sitting)   Pulse 66   Temp 97.9 °F (36.6 °C) (Oral)   Resp 18   Ht 162.6 cm (64\")   Wt 88.5 kg (195 lb)   SpO2 98%   BMI 33.47 kg/m²     Physical Exam   General:  Awake alert no apparent distress    Head: Normocephalic, atraumatic, eyes PERRLA EOMI, nose normal, oropharynx normal    Neck: Supple, no meningismus, no cervical lymphadenopathy or JVD    Heart: Regular rate and rhythm, no murmurs or rubs, 2+ radial pulses    Lungs: Clear to auscultation bilaterally, no wheezing or rhonchi or rales, no respiratory distress    Abdomen: Soft, nontender, nondistended, no signs of peritonitis    Skin: Warm, dry, no rash    Musculoskeletal: Normal range of motion, no obvious deformities, there is mild edema to right ankle.  Range of motion intact, PMS WNL, there is obvious tenderness on exam to lateral side of right ankle.  No discoloration, no traumatic ecchymosis, no open wounds, no obvious deformity or dislocation.    Neurologic: Awake, alert, oriented x 3, no motor or sensory deficits appreciated    Psych: No suicidal or homicidal ideations, no psychosis          Procedures:  Procedures      Medical Decision Making:      Comorbidities that affect care:    Hypertension, hepatitis B, seasonal allergies, anxiety, hyperlipidemia,    External Notes reviewed:    Previous Radiological Studies: I reviewed patient's x-ray that was completed on 10/13/2024 that indicated " there was no fracture or dislocation.      The following orders were placed and all results were independently analyzed by me:  Orders Placed This Encounter   Procedures    Ambulatory Referral to Podiatry       Medications Given in the Emergency Department:  Medications   ketorolac (TORADOL) injection 60 mg (60 mg Intramuscular Given 10/23/24 1230)        ED Course:    ED Course as of 10/23/24 1234   Wed Oct 23, 2024   1228 Patient advises that she was told to follow-up with her primary care provider however she did not make an appointment stating that she thought she would feel better.  She presents today with concerns over the continued swelling and discomfort.  She is also requesting a CT or MRI completed.  I discussed with her the need to have an MRI or CT completed on an outpatient basis with her primary care provider for follow-up reasons and also that we cannot offer those here in the emergency department on a nonemergent basis.  Patient verbalized understanding.  She did not receive any medications for discomfort at the urgent care, I will provide those at time of discharge as well as referral to podiatry. [MS]      ED Course User Index  [MS] Farzana Sow, PEBBLES       Labs:    Lab Results (last 24 hours)       ** No results found for the last 24 hours. **             Imaging:    No Radiology Exams Resulted Within Past 24 Hours      Differential Diagnosis and Discussion:    Extremity Pain: Differential diagnosis includes but is not limited to soft tissue sprain, tendonitis, tendon injury, dislocation, fracture, deep vein thrombosis, arterial insufficiency, osteoarthritis, bursitis, and ligamentous damage.        [facility]                 Patient Care Considerations:    I Considered completing an x-ray of patient's right foot and ankle however patient recently had 1 at an outside facility that was available for viewing within the epic charting system.  There was no fracture or dislocation identified on  x-ray.  Also considered placing patient's extremity in a Ortho boot to help with stabilization however patient advises she has went home from her visit at the urgent care.      Consultants/Shared Management Plan:    None    Social Determinants of Health:    Patient is independent, reliable, and has access to care.       Disposition and Care Coordination:    Discharged: The patient is suitable and stable for discharge with no need for consideration of admission.    I have explained the patient´s condition, diagnoses and treatment plan based on the information available to me at this time. I have answered questions and addressed any concerns. The patient has a good  understanding of the patient´s diagnosis, condition, and treatment plan as can be expected at this point. The vital signs have been stable. The patient´s condition is stable and appropriate for discharge from the emergency department.      The patient will pursue further outpatient evaluation with the primary care physician or other designated or consulting physician as outlined in the discharge instructions. They are agreeable to this plan of care and follow-up instructions have been explained in detail. The patient has received these instructions in written format and has expressed an understanding of the discharge instructions. The patient is aware that any significant change in condition or worsening of symptoms should prompt an immediate return to this or the closest emergency department or call to 911.    Final diagnoses:   Acute right ankle pain (subacute)   Sprain of right ankle, unspecified ligament, initial encounter   Right ankle swelling        ED Disposition       ED Disposition   Discharge    Condition   Stable    Comment   --               This medical record created using voice recognition software.       Farzana Sow, APRN  10/23/24 2554

## 2024-10-23 NOTE — DISCHARGE INSTRUCTIONS
It is important that you follow-up with your primary care provider in order to have a CT or MRI completed of your ankle.  If they feel it is necessary that you see the specialist, the podiatrist, I have provided you a referral to that specialist.  I have also prescribed you medication to help with your pain and discomfort.  However I do suggest you wear the Ortho boot that was provided to you at the urgent care center.  If you choose not to wear it, at least to wear a compression bandage, or a regular Ace bandage, to help with your swelling.  This will help with your overall discomfort.  If you become unable to bear any weight on your extremity, if you feel that your foot is becoming more red and warm in nature, or if you feel it is becoming cool to the touch please return to the ER otherwise follow-up with your primary care provider 5 to 7 days

## 2024-10-23 NOTE — Clinical Note
[facility] EMERGENCY ROOM  [address] [place] [address]  Phone: [phone]  Fax: [phone]    Maria Ines Garcia was seen and treated in our emergency department on 10/23/2024.  She may return to work on 10/24/2024.         Thank you for choosing [facility].    Farzana Sow APRN       Dapsone Pregnancy And Lactation Text: This medication is Pregnancy Category C and is not considered safe during pregnancy or breast feeding.

## 2024-10-24 ENCOUNTER — PATIENT OUTREACH (OUTPATIENT)
Dept: CASE MANAGEMENT | Facility: OTHER | Age: 49
End: 2024-10-24
Payer: COMMERCIAL

## 2024-10-24 NOTE — OUTREACH NOTE
Adult Wellness Assessment  Questions/Answers      Flowsheet Row Most Recent Value   How often do you have someone help you read facts about your health? never   How often do you have trouble learning about your health because you don't know what the written words mean? never   How confident are you filling out forms by yourself? always          Adult Patient Profile  Questions/Answers      Flowsheet Row Most Recent Value   Symptoms/Conditions Managed at Home musculoskeletal   Musculoskeletal Symptoms/Conditions --  [sprain right ankle]   Musculoskeletal Management Strategies medication therapy, other (see comments)  [Patient is using wrap on ankle]   Musculoskeletal Comment Telephone call with patient. Discussed sprained right ankle.  Cisco went to ED yesterday and medication was ordered to help with pain.  She reports it is helping and ankle feels better today.  She is wrapping ankle as she does not like to wear the boot.  ED provider recommended she see PCP in a week.  She has a previous scheduled appointment but it is not until 11/21/24.  Encouraged patient to call and see if she can get in sooner.   How to be Addressed Maria Ines   How Well Do You Speak English? very well   Source of Information patient            AMBULATORY CASE MANAGEMENT NOTE    Names and Relationships of Patient/Support Persons: Contact: Maria Ines Garcia; Relationship: Self -     Patient Outreach    Telephone call with patient. Engaged in the Employee Case Management Program. Discussed sprained ankle.   No issue with social determinants,denies food, medication, transportation, or financial insecurities. No questions or concerns per pt at this time. Advised pt to call RN-ECM with any new needs. Encouraged use of 24hour nurseline if needed.  Agrees to follow up outreach.  Emailed Employee Case management program information and Advance Care Planning information.  Provided 24 hour nurse call line number and location  which is on back of the [place]  Insurance Card.     Education Documentation  No documentation found.        JACQUE JEAN-BAPTISTE  Ambulatory Case Management    10/24/2024, 12:04 EDT

## 2024-10-29 ENCOUNTER — OFFICE VISIT (OUTPATIENT)
Dept: FAMILY MEDICINE CLINIC | Facility: CLINIC | Age: 49
End: 2024-10-29
Payer: COMMERCIAL

## 2024-10-29 ENCOUNTER — HOSPITAL ENCOUNTER (OUTPATIENT)
Facility: HOSPITAL | Age: 49
Discharge: HOME | End: 2024-10-29
Admitting: FAMILY MEDICINE
Payer: COMMERCIAL

## 2024-10-29 VITALS
HEART RATE: 63 BPM | WEIGHT: 193.2 LBS | BODY MASS INDEX: 32.98 KG/M2 | DIASTOLIC BLOOD PRESSURE: 62 MMHG | HEIGHT: 64 IN | OXYGEN SATURATION: 100 % | SYSTOLIC BLOOD PRESSURE: 106 MMHG | TEMPERATURE: 97.5 F

## 2024-10-29 DIAGNOSIS — R60.0 EDEMA OF RIGHT LOWER EXTREMITY: ICD-10-CM

## 2024-10-29 DIAGNOSIS — M25.571 ACUTE RIGHT ANKLE PAIN: Primary | ICD-10-CM

## 2024-10-29 DIAGNOSIS — S93.401S SPRAIN OF RIGHT ANKLE, UNSPECIFIED LIGAMENT, SEQUELA: ICD-10-CM

## 2024-10-29 LAB
BH CV LOWER VASCULAR LEFT COMMON FEMORAL AUGMENT: NORMAL
BH CV LOWER VASCULAR LEFT COMMON FEMORAL COMPETENT: NORMAL
BH CV LOWER VASCULAR LEFT COMMON FEMORAL COMPRESS: NORMAL
BH CV LOWER VASCULAR LEFT COMMON FEMORAL PHASIC: NORMAL
BH CV LOWER VASCULAR LEFT COMMON FEMORAL SPONT: NORMAL
BH CV LOWER VASCULAR RIGHT COMMON FEMORAL AUGMENT: NORMAL
BH CV LOWER VASCULAR RIGHT COMMON FEMORAL COMPETENT: NORMAL
BH CV LOWER VASCULAR RIGHT COMMON FEMORAL COMPRESS: NORMAL
BH CV LOWER VASCULAR RIGHT COMMON FEMORAL PHASIC: NORMAL
BH CV LOWER VASCULAR RIGHT COMMON FEMORAL SPONT: NORMAL
BH CV LOWER VASCULAR RIGHT DISTAL FEMORAL COMPRESS: NORMAL
BH CV LOWER VASCULAR RIGHT GASTRONEMIUS COMPRESS: NORMAL
BH CV LOWER VASCULAR RIGHT GREATER SAPH AK AUGMENT: NORMAL
BH CV LOWER VASCULAR RIGHT GREATER SAPH AK COMPETENT: NORMAL
BH CV LOWER VASCULAR RIGHT GREATER SAPH AK COMPRESS: NORMAL
BH CV LOWER VASCULAR RIGHT GREATER SAPH AK PHASIC: NORMAL
BH CV LOWER VASCULAR RIGHT GREATER SAPH AK SPONT: NORMAL
BH CV LOWER VASCULAR RIGHT GREATER SAPH BK COMPRESS: NORMAL
BH CV LOWER VASCULAR RIGHT LESSER SAPH COMPRESS: NORMAL
BH CV LOWER VASCULAR RIGHT MID FEMORAL AUGMENT: NORMAL
BH CV LOWER VASCULAR RIGHT MID FEMORAL COMPETENT: NORMAL
BH CV LOWER VASCULAR RIGHT MID FEMORAL COMPRESS: NORMAL
BH CV LOWER VASCULAR RIGHT MID FEMORAL PHASIC: NORMAL
BH CV LOWER VASCULAR RIGHT MID FEMORAL SPONT: NORMAL
BH CV LOWER VASCULAR RIGHT PERONEAL AUGMENT: NORMAL
BH CV LOWER VASCULAR RIGHT PERONEAL COMPETENT: NORMAL
BH CV LOWER VASCULAR RIGHT PERONEAL COMPRESS: NORMAL
BH CV LOWER VASCULAR RIGHT PERONEAL PHASIC: NORMAL
BH CV LOWER VASCULAR RIGHT PERONEAL SPONT: NORMAL
BH CV LOWER VASCULAR RIGHT POPLITEAL AUGMENT: NORMAL
BH CV LOWER VASCULAR RIGHT POPLITEAL COMPETENT: NORMAL
BH CV LOWER VASCULAR RIGHT POPLITEAL COMPRESS: NORMAL
BH CV LOWER VASCULAR RIGHT POPLITEAL PHASIC: NORMAL
BH CV LOWER VASCULAR RIGHT POPLITEAL SPONT: NORMAL
BH CV LOWER VASCULAR RIGHT POSTERIOR TIBIAL AUGMENT: NORMAL
BH CV LOWER VASCULAR RIGHT POSTERIOR TIBIAL COMPETENT: NORMAL
BH CV LOWER VASCULAR RIGHT POSTERIOR TIBIAL COMPRESS: NORMAL
BH CV LOWER VASCULAR RIGHT POSTERIOR TIBIAL PHASIC: NORMAL
BH CV LOWER VASCULAR RIGHT POSTERIOR TIBIAL SPONT: NORMAL
BH CV LOWER VASCULAR RIGHT PROXIMAL FEMORAL COMPRESS: NORMAL

## 2024-10-29 PROCEDURE — 93971 EXTREMITY STUDY: CPT | Performed by: SURGERY

## 2024-10-29 PROCEDURE — 93971 EXTREMITY STUDY: CPT

## 2024-10-29 RX ORDER — KETOROLAC TROMETHAMINE 30 MG/ML
60 INJECTION, SOLUTION INTRAMUSCULAR; INTRAVENOUS ONCE
Status: COMPLETED | OUTPATIENT
Start: 2024-10-29 | End: 2024-10-29

## 2024-10-29 RX ADMIN — KETOROLAC TROMETHAMINE 60 MG: 30 INJECTION, SOLUTION INTRAMUSCULAR; INTRAVENOUS at 10:42

## 2024-10-29 NOTE — PROGRESS NOTES
"Chief Complaint  Right ankle and knee pain    Subjective          Maria Ines Garcia presents to [facility]  Ankle Injury       Ms Garcia is a 49 year old female presenting to the clinic with right ankle and knee pain ongoing for 2 weeks (since 10/13/2024). She fell off a ladder and had an inversion injury to her right ankle, noting a pop and subsequent right ankle and knee pain. She was seen by urgent care on 10/13, where x-ray imaging of the right ankle and knee was negative for acute fracture. Care was conservative with recommendations for rest, ice, compression and elevation with a boot. She has not been entirely compliant with wearing the boot, or any ankle brace due to discomfort. She saw only mild improvement until 10/23 when she presented to the emergency room with the same complaint. There, she was given diclofenac, a Toradol shot and a regiment of prednisone which she discontinued prematurely.   Today (10/29) she presents with ankle and knee pain but reports improvement since 10/13. She is able to bear weight, and describes the pain as more of an \"ache\" now instead of the sharp initial pain. She is able to ambulate and drive. She denies any bruising to the ankle itself over the course of the injury. She notes that it is painful when standing and walking, and does not bother her while resting. Additionally she noted that swelling, while still present today, has been improving. There is tenderness to the right medial and lateral malleoli as well as moderate swelling of the ankle region without bruising or erythema.   Also of note she mentions associated knee pain on the posterior aspect of her calf and popliteal fossa that is exacerbated with ankle dorsiflexion while the knee joint is extended, with tibial ecchymoses noted.         Current Outpatient Medications   Medication Instructions    aspirin 81 mg, Daily    bisoprolol (ZEBeta) 5 MG tablet TAKE 1 TABLET BY MOUTH DAILY    " "buPROPion XL (WELLBUTRIN XL) 150 MG 24 hr tablet TAKE 1 TABLET BY MOUTH EVERY MORNING    clindamycin (CLEOCIN T) 1 % external solution Apply to affected area once daily    diclofenac (VOLTAREN) 50 mg, Oral, 3 Times Daily PRN    doxycycline (MONODOX) 50 mg, Oral, Daily    Fexofenadine HCl (ALLERGY 24-HR PO) 1 tablet, Every Morning    predniSONE (DELTASONE) 20 MG tablet Take 1 tablet by mouth 3 (Three) Times a Day With Meals for 3 days, THEN 1 tablet 2 (Two) Times a Day for 3 days, THEN 1 tablet Daily for 3 days.    rosuvastatin (CRESTOR) 5 mg, Oral, Daily    triamcinolone (KENALOG) 0.1 % ointment 1 Application, Topical, 2 Times Daily    vitamin B-12 (CYANOCOBALAMIN) 1,000 mcg, Oral, Daily    vitamin D (ERGOCALCIFEROL) 50,000 Units, Oral, Weekly       The following portions of the patient's history were reviewed and updated as appropriate: allergies, current medications, past family history, past medical history, past social history, past surgical history, and problem list.    Objective   Vital Signs:   /62   Pulse 63   Temp 97.5 °F (36.4 °C)   Ht 162.6 cm (64\")   Wt 87.6 kg (193 lb 3.2 oz)   SpO2 100%   BMI 33.16 kg/m²     BP Readings from Last 3 Encounters:   10/29/24 106/62   10/23/24 129/74   10/13/24 133/78     Wt Readings from Last 3 Encounters:   10/29/24 87.6 kg (193 lb 3.2 oz)   10/23/24 88.5 kg (195 lb)   10/08/24 87.1 kg (192 lb)     BMI is >= 30 and <35. (Class 1 Obesity). The following options were offered after discussion;: exercise counseling/recommendations and nutrition counseling/recommendations     Physical Exam  HENT:      Nose: Nose normal.   Cardiovascular:      Rate and Rhythm: Normal rate and regular rhythm.      Heart sounds: No murmur heard.     No friction rub.   Pulmonary:      Effort: Pulmonary effort is normal.      Breath sounds: Normal breath sounds. No wheezing or rhonchi.   Abdominal:      General: Bowel sounds are normal.   Musculoskeletal:         General: Swelling and " tenderness present. Normal range of motion.      Comments: Swelling and malleolar tenderness of the right ankle. Anterior and posterior drawer testing of the right ankle negative, as well as inversion and eversion testing, and foster test. Positive homans sign on the right.    Skin:     Comments: No erythema or bruising to the ankle joint. Mild bruising and ecchymoses noted in the anterior tibial region.    Neurological:      Mental Status: She is alert.            Result Review :   The following data was reviewed by: Lennox Tran DO on 10/29/2024:  Common labs          2/9/2024    08:02   Common Labs   Glucose 110    BUN 10    Creatinine 0.91    Sodium 142    Potassium 4.8    Chloride 105    Calcium 9.9    Albumin 4.9    Total Bilirubin 0.3    Alkaline Phosphatase 91    AST (SGOT) 18    ALT (SGPT) 15    WBC 7.81    Hemoglobin 14.5    Hematocrit 42.6    Platelets 206    Total Cholesterol 199    Triglycerides 222    HDL Cholesterol 58    LDL Cholesterol  103    Hemoglobin A1C 6.00             Lab Results   Component Value Date    POCPREGUR Negative 11/29/2019    BILIRUBINUR Negative 08/30/2024       Procedures        Assessment and Plan    Diagnoses and all orders for this visit:    1. Acute right ankle pain (Primary)  -     MRI Ankle Right Without Contrast; Future  -     ketorolac (TORADOL) injection 60 mg    2. Sprain of right ankle, unspecified ligament, sequela  -     MRI Ankle Right Without Contrast; Future    3. Edema of right lower extremity  -     Duplex Venous Lower Extremity - Right CAR; Future    Other orders  -     diclofenac (VOLTAREN) 50 MG EC tablet; Take 1 tablet by mouth 3 (Three) Times a Day As Needed (pain).  Dispense: 20 tablet; Refill: 0      Given the duration of symptoms, further imaging of the right ankle joint with MRI was discussed with the patient, and she was agreeable to this. Additionally, with the posterior knee pain, positive homans sign and tibial ecchymoses there is concern  for a thrombosis, further workup of this includes venous doppler of the right lower extremity. She was recommended to stay active on her ankle and keep moving to avoid exacerbation of a potential thrombosis. She was also given a second Toradol shot today as well as a refill of diclofenac for continued management of pain.       Medications Discontinued During This Encounter   Medication Reason    diclofenac (VOLTAREN) 50 MG EC tablet Reorder          Follow Up   Return if symptoms worsen or fail to improve.  Patient was given instructions and counseling regarding her condition or for health maintenance advice. Please see specific information pulled into the AVS if appropriate.       Lennox Tran DO  10/29/24  11:21 EDT

## 2024-10-30 ENCOUNTER — PATIENT MESSAGE (OUTPATIENT)
Dept: FAMILY MEDICINE CLINIC | Facility: CLINIC | Age: 49
End: 2024-10-30
Payer: COMMERCIAL

## 2024-10-30 DIAGNOSIS — M25.561 ACUTE PAIN OF RIGHT KNEE: Primary | ICD-10-CM

## 2024-11-21 ENCOUNTER — OFFICE VISIT (OUTPATIENT)
Dept: FAMILY MEDICINE CLINIC | Facility: CLINIC | Age: 49
End: 2024-11-21
Payer: COMMERCIAL

## 2024-11-21 VITALS
BODY MASS INDEX: 32.39 KG/M2 | TEMPERATURE: 98.2 F | OXYGEN SATURATION: 99 % | WEIGHT: 189.7 LBS | DIASTOLIC BLOOD PRESSURE: 80 MMHG | HEIGHT: 64 IN | SYSTOLIC BLOOD PRESSURE: 130 MMHG | HEART RATE: 78 BPM

## 2024-11-21 DIAGNOSIS — R73.03 PREDIABETES: ICD-10-CM

## 2024-11-21 DIAGNOSIS — M25.561 ACUTE PAIN OF RIGHT KNEE: ICD-10-CM

## 2024-11-21 DIAGNOSIS — E53.8 B12 DEFICIENCY: ICD-10-CM

## 2024-11-21 DIAGNOSIS — E78.5 HYPERLIPIDEMIA LDL GOAL <100: ICD-10-CM

## 2024-11-21 DIAGNOSIS — E55.9 VITAMIN D DEFICIENCY: ICD-10-CM

## 2024-11-21 DIAGNOSIS — Z51.81 MEDICATION MONITORING ENCOUNTER: ICD-10-CM

## 2024-11-21 DIAGNOSIS — M25.571 ACUTE RIGHT ANKLE PAIN: Primary | ICD-10-CM

## 2024-11-21 LAB
BACTERIA UR QL AUTO: ABNORMAL /HPF
BILIRUB UR QL STRIP: NEGATIVE
CLARITY UR: ABNORMAL
COD CRY URNS QL: PRESENT /HPF
COLOR UR: YELLOW
GLUCOSE UR STRIP-MCNC: NEGATIVE MG/DL
HGB UR QL STRIP.AUTO: NEGATIVE
HYALINE CASTS UR QL AUTO: ABNORMAL /LPF
KETONES UR QL STRIP: NEGATIVE
LEUKOCYTE ESTERASE UR QL STRIP.AUTO: NEGATIVE
NITRITE UR QL STRIP: NEGATIVE
PH UR STRIP.AUTO: 5.5 [PH] (ref 5–8)
PROT UR QL STRIP: ABNORMAL
RBC # UR STRIP: ABNORMAL /HPF
REF LAB TEST METHOD: ABNORMAL
SP GR UR STRIP: >1.03 (ref 1–1.03)
SQUAMOUS #/AREA URNS HPF: ABNORMAL /HPF
UROBILINOGEN UR QL STRIP: ABNORMAL
WBC # UR STRIP: ABNORMAL /HPF

## 2024-11-21 PROCEDURE — 81001 URINALYSIS AUTO W/SCOPE: CPT | Performed by: FAMILY MEDICINE

## 2024-11-21 RX ORDER — SCOLOPAMINE TRANSDERMAL SYSTEM 1 MG/1
1 PATCH, EXTENDED RELEASE TRANSDERMAL
Qty: 4 EACH | Refills: 1 | Status: SHIPPED | OUTPATIENT
Start: 2024-11-21

## 2024-11-21 RX ORDER — IBUPROFEN 800 MG/1
800 TABLET, FILM COATED ORAL EVERY 8 HOURS PRN
Qty: 90 TABLET | Refills: 0 | Status: SHIPPED | OUTPATIENT
Start: 2024-11-21

## 2024-11-21 RX ORDER — KETOROLAC TROMETHAMINE 30 MG/ML
60 INJECTION, SOLUTION INTRAMUSCULAR; INTRAVENOUS ONCE
Status: COMPLETED | OUTPATIENT
Start: 2024-11-21 | End: 2024-11-21

## 2024-11-21 RX ORDER — METHYLPREDNISOLONE 4 MG/1
TABLET ORAL
Qty: 21 EACH | Refills: 0 | Status: SHIPPED | OUTPATIENT
Start: 2024-11-21

## 2024-11-21 RX ADMIN — KETOROLAC TROMETHAMINE 60 MG: 30 INJECTION, SOLUTION INTRAMUSCULAR; INTRAVENOUS at 16:18

## 2024-11-21 NOTE — PROGRESS NOTES
Chief Complaint  Right ankle and right knee pain      Subjective          Maria Ines Garcia presents to [facility]    History of Present Illness     History of Present Illness  The patient is a 49-year-old female who presents today for follow-up.    She reports experiencing pain in her right knee and ankle, which she attributes to a fall from a ladder. The pain is described as a popping sensation followed by persistent discomfort. Despite the passage of time, her ankle remains swollen. She has been managing the pain with ibuprofen 800 mg and Advil, taking between 6 to 8 tablets daily. Additionally, she mentions a recent onset of numbness in her little toe, which began approximately 3 weeks ago. She has an upcoming appointment with a podiatrist.    She expresses a desire to have lab tests ordered, including a urinalysis. She recalls a previous visit to urgent care where she was tested for a urinary tract infection, which came back negative. However, she was informed of the presence of blood in her urine but did not follow up on this finding. She is not experiencing any symptoms of a urinary tract infection.    She requests a prescription for scopolamine patches in preparation for an upcoming cruise in February 2024.    IMMUNIZATIONS  She is up to date on her tetanus vaccine.         Current Outpatient Medications   Medication Instructions    aspirin 81 mg, Daily    bisoprolol (ZEBeta) 5 MG tablet TAKE 1 TABLET BY MOUTH DAILY    buPROPion XL (WELLBUTRIN XL) 150 MG 24 hr tablet TAKE 1 TABLET BY MOUTH EVERY MORNING    clindamycin (CLEOCIN T) 1 % external solution Apply to affected area once daily    doxycycline (MONODOX) 50 mg, Oral, Daily    Fexofenadine HCl (ALLERGY 24-HR PO) 1 tablet, Every Morning    ibuprofen (ADVIL,MOTRIN) 800 mg, Oral, Every 8 Hours PRN    methylPREDNISolone (MEDROL) 4 MG dose pack Take as directed on package instructions.    rosuvastatin (CRESTOR) 5 mg, Oral, Daily  "   Scopolamine 1 MG/3DAYS patch 1 patch, Transdermal, Every 72 Hours    triamcinolone (KENALOG) 0.1 % ointment 1 Application, Topical, 2 Times Daily    vitamin B-12 (CYANOCOBALAMIN) 1,000 mcg, Oral, Daily    vitamin D (ERGOCALCIFEROL) 50,000 Units, Oral, Weekly       The following portions of the patient's history were reviewed and updated as appropriate: allergies, current medications, past family history, past medical history, past social history, past surgical history, and problem list.    Objective   Vital Signs:   /80 (BP Location: Left arm, Patient Position: Sitting, Cuff Size: Adult)   Pulse 78   Temp 98.2 °F (36.8 °C) (Oral)   Ht 162.6 cm (64\")   Wt 86 kg (189 lb 11.2 oz)   SpO2 99%   BMI 32.56 kg/m²     BP Readings from Last 3 Encounters:   11/21/24 130/80   10/29/24 106/62   10/23/24 129/74     Wt Readings from Last 3 Encounters:   11/21/24 86 kg (189 lb 11.2 oz)   10/29/24 87.6 kg (193 lb 3.2 oz)   10/23/24 88.5 kg (195 lb)           Physical Exam  Vitals reviewed.   Constitutional:       Appearance: Normal appearance.   HENT:      Head: Normocephalic and atraumatic.      Right Ear: External ear normal.      Left Ear: External ear normal.      Nose: Nose normal.   Eyes:      Conjunctiva/sclera: Conjunctivae normal.   Cardiovascular:      Rate and Rhythm: Normal rate and regular rhythm.      Heart sounds: No murmur heard.     No friction rub. No gallop.   Pulmonary:      Effort: Pulmonary effort is normal.      Breath sounds: Normal breath sounds. No wheezing or rhonchi.   Abdominal:      General: Bowel sounds are normal. There is no distension.      Palpations: Abdomen is soft.      Tenderness: There is no abdominal tenderness.   Musculoskeletal:      Comments: Right knee demonstrates negative valgus and varus stress testing, negative Guido, negative anterior posterior drawer testing.  Effusion noted.  There is swelling noted around the right ankle on the lateral aspect.  She does have some " tenderness with active and passive range of motion of the right ankle.   Skin:     General: Skin is warm and dry.   Neurological:      Mental Status: She is alert and oriented to person, place, and time.      Cranial Nerves: No cranial nerve deficit.   Psychiatric:         Mood and Affect: Mood and affect normal.         Behavior: Behavior normal.         Thought Content: Thought content normal.         Judgment: Judgment normal.            Result Review :   The following data was reviewed by: Lennox Tran DO on 11/21/2024:  Common labs          2/9/2024    08:02   Common Labs   Glucose 110    BUN 10    Creatinine 0.91    Sodium 142    Potassium 4.8    Chloride 105    Calcium 9.9    Albumin 4.9    Total Bilirubin 0.3    Alkaline Phosphatase 91    AST (SGOT) 18    ALT (SGPT) 15    WBC 7.81    Hemoglobin 14.5    Hematocrit 42.6    Platelets 206    Total Cholesterol 199    Triglycerides 222    HDL Cholesterol 58    LDL Cholesterol  103    Hemoglobin A1C 6.00             Lab Results   Component Value Date    POCPREGUR Negative 11/29/2019    BILIRUBINUR Negative 08/30/2024       Results  Laboratory Studies  Last A1c was 6.0%. Lipid panel showed LDL at 103.    Imaging  Normal right lower extremity venous duplex scan.    Procedures        Assessment and Plan    Diagnoses and all orders for this visit:    1. Acute right ankle pain (Primary)    2. Acute pain of right knee    3. Medication monitoring encounter  -     CBC & Differential; Future  -     Comprehensive Metabolic Panel; Future  -     Urinalysis With Microscopic - Urine, Clean Catch; Future    4. Prediabetes  -     CBC & Differential; Future  -     Comprehensive Metabolic Panel; Future  -     Hemoglobin A1c; Future    5. Hyperlipidemia LDL goal <100  -     Lipid Panel; Future    6. B12 deficiency  -     Vitamin B12; Future    7. Vitamin D deficiency  -     Vitamin D,25-Hydroxy; Future    Other orders  -     Scopolamine 1 MG/3DAYS patch; Place 1 patch on the  skin as directed by provider Every 72 (Seventy-Two) Hours.  Dispense: 4 each; Refill: 1  -     ibuprofen (ADVIL,MOTRIN) 800 MG tablet; Take 1 tablet by mouth Every 8 (Eight) Hours As Needed for Moderate Pain.  Dispense: 90 tablet; Refill: 0  -     methylPREDNISolone (MEDROL) 4 MG dose pack; Take as directed on package instructions.  Dispense: 21 each; Refill: 0        Assessment & Plan  1. Right knee and ankle pain.  A Doppler ultrasound was performed during her last visit, yielding normal results. An MRI of the right knee has been ordered. She has been advised to take ibuprofen 800 mg every 8 hours as needed for moderate pain, ensuring it is taken with food. A Medrol pack has been prescribed to help reduce inflammation. She has been instructed not to take Voltaren tablets concurrently with ibuprofen or Advil. A Toradol 60 mg injection will be administered today. Should the MRI results indicate any abnormalities, a referral to a podiatrist or orthopedist will be considered.    2. Prediabetes.  Her previous A1c level was 6.0 percent. Orders for CBC, CMP, A1c, B12, vitamin D, and lipid panel tests have been placed. A urinalysis will be conducted today.    3. Motion sickness.  A prescription for scopolamine patches has been provided. She has been instructed to replace the patch every 72 hours, placing it behind the ear.    Follow-up  Return in a couple of months for follow up.       Medications Discontinued During This Encounter   Medication Reason    diclofenac (VOLTAREN) 50 MG EC tablet           Follow Up   Return in about 2 months (around 1/21/2025) for right knee pain/ankle pain.  Patient was given instructions and counseling regarding her condition or for health maintenance advice. Please see specific information pulled into the AVS if appropriate.     Patient or patient representative verbalized consent for the use of Ambient Listening during the visit with  Lennox Tran DO for chart documentation.  11/21/2024  16:05 EST    Lennox Tran DO  11/21/24  16:05 EST

## 2024-11-27 ENCOUNTER — HOSPITAL ENCOUNTER (OUTPATIENT)
Dept: MRI IMAGING | Facility: HOSPITAL | Age: 49
Discharge: HOME | End: 2024-11-27
Payer: COMMERCIAL

## 2024-11-27 DIAGNOSIS — M25.561 ACUTE PAIN OF RIGHT KNEE: ICD-10-CM

## 2024-11-27 DIAGNOSIS — M25.571 ACUTE RIGHT ANKLE PAIN: ICD-10-CM

## 2024-11-27 DIAGNOSIS — S93.401S SPRAIN OF RIGHT ANKLE, UNSPECIFIED LIGAMENT, SEQUELA: ICD-10-CM

## 2024-11-27 PROCEDURE — 73721 MRI JNT OF LWR EXTRE W/O DYE: CPT

## 2024-12-02 DIAGNOSIS — G89.29 CHRONIC PAIN OF RIGHT KNEE: Primary | ICD-10-CM

## 2024-12-02 DIAGNOSIS — M25.561 CHRONIC PAIN OF RIGHT KNEE: Primary | ICD-10-CM

## 2024-12-03 ENCOUNTER — PATIENT OUTREACH (OUTPATIENT)
Dept: CASE MANAGEMENT | Facility: OTHER | Age: 49
End: 2024-12-03
Payer: COMMERCIAL

## 2024-12-03 NOTE — PLAN OF CARE
Problem: Wellness  Goal: Eat Healthy  Outcome: Progressing  Intervention: My Healthy Eating To Do List  Description:   Why is this important?  When you are ready to manage your nutrition or weight, having a plan and setting goals will help.  Taking small steps to change how you eat and exercise is a good place to start.    Flowsheets (Taken 12/3/2024 1542)  My Healthy Eating To Do List: read food labels for fat, fiber, carbohydrates and portion size  Goal: Learn More About My Health  Outcome: Progressing  Intervention: My Health Knowledge To Do List  Description:   Why is this important?  The best way to learn about your health and care is by talking to the doctor and nurse.  They will answer your questions and give you information in the way that you like best.    Flowsheets (Taken 12/3/2024 1542)  My Health Knowledge To Do List: ask questions  Goal: Optimal Wellbeing  Outcome: Progressing

## 2024-12-03 NOTE — OUTREACH NOTE
Adult Patient Profile  Questions/Answers      Flowsheet Row Most Recent Value   Symptoms/Conditions Managed at Home --  [pre diabetes]   Diabetes Management Strategies routine screenings   Last A1C Result 6.0 on 2/9/24   Diabetes Comment Discussed prediabetes with patient.  Encouraged patent to manage sugars and carbs and choose healthy diet. Emailed patient information on pre diabetes.  Patient is aware she has outstanding labs.   Musculoskeletal Comment Telephone call with patient.  She reports she still has some ankle pain, She reports she had an MRI and it showed two fractures. She is scheduled with Dr. Ann, Podiatry on 12/12/24.            AMBULATORY CASE MANAGEMENT NOTE    Names and Relationships of Patient/Support Persons: Contact: Maria Ines Garcia; Relationship: Self -     Telephone call with patient.  Discussed ankle and pre diabetes.  Encouraged patient to reach out o Employee Case management as needed.     Education Documentation  No documentation found.        JACQUE JEAN-BAPTISTE  Ambulatory Case Management    12/3/2024, 15:45 EST

## 2024-12-05 ENCOUNTER — PATIENT MESSAGE (OUTPATIENT)
Dept: FAMILY MEDICINE CLINIC | Facility: CLINIC | Age: 49
End: 2024-12-05
Payer: COMMERCIAL

## 2024-12-05 NOTE — TELEPHONE ENCOUNTER
Please let patient know that I will send in Augmentin for her.  If she does not start to feel better she really needs to have a proper evaluation meaning,  go to urgent care or the emergency room or come to the office.  Please have her be on a clear liquid diet until her stools improve.

## 2024-12-06 RX ORDER — BISOPROLOL FUMARATE 5 MG/1
5 TABLET, FILM COATED ORAL DAILY
Qty: 90 TABLET | Refills: 3 | Status: SHIPPED | OUTPATIENT
Start: 2024-12-06

## 2024-12-06 RX ORDER — ERGOCALCIFEROL 1.25 MG/1
50000 CAPSULE, LIQUID FILLED ORAL WEEKLY
Qty: 13 CAPSULE | Refills: 3 | Status: SHIPPED | OUTPATIENT
Start: 2024-12-06

## 2024-12-06 RX ORDER — IBUPROFEN 800 MG/1
800 TABLET, FILM COATED ORAL EVERY 8 HOURS PRN
Qty: 90 TABLET | Refills: 0 | Status: SHIPPED | OUTPATIENT
Start: 2024-12-06

## 2024-12-12 ENCOUNTER — OFFICE VISIT (OUTPATIENT)
Dept: PODIATRY | Facility: CLINIC | Age: 49
End: 2024-12-12
Payer: COMMERCIAL

## 2024-12-12 VITALS
DIASTOLIC BLOOD PRESSURE: 77 MMHG | TEMPERATURE: 98 F | BODY MASS INDEX: 32.1 KG/M2 | HEIGHT: 64 IN | SYSTOLIC BLOOD PRESSURE: 120 MMHG | OXYGEN SATURATION: 95 % | HEART RATE: 78 BPM | WEIGHT: 188 LBS

## 2024-12-12 DIAGNOSIS — S92.001A: ICD-10-CM

## 2024-12-12 DIAGNOSIS — S92.124A CLOSED NONDISPLACED FRACTURE OF BODY OF RIGHT TALUS, INITIAL ENCOUNTER: Primary | ICD-10-CM

## 2024-12-12 PROCEDURE — 99203 OFFICE O/P NEW LOW 30 MIN: CPT | Performed by: PODIATRIST

## 2024-12-16 NOTE — PROGRESS NOTES
[facility] - PODIATRY    Today's Date: 24    Patient Name: Maria Ines Garcia  MRN: [id number]  CSN: [id number]  PCP: Lennox Tran DO,   Referring Provider: Farzana Sow, *    SUBJECTIVE     Chief Complaint   Patient presents with    Right Ankle - Pain, Establish Care     Fell off a ladder at home 10/13/24 seen at  exam xrays dx sprain placed in CAM walker which she did not wear seen 10/23/24 due to continued pain exam xrays advised to wear boot   swa PCP 10/29/24 ordered MRI of ankle Venous duplex   MRI dx fractures and patient referred here     HPI: Maria Ines Garcia, a 49 y.o.female, presents to clinic.    Patient is a 49-year-old female presenting with right ankle pain.  Patient states she fell off a ladder back in October.  She was seen in urgent care and was diagnosed with a sprain she subsequently had a lot of pain in her ankle and had an MRI done in early November.  Patient states that she was diagnosed with a fracture and referred here.  Now that she has been wearing the boot it has improved.    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    D & C HYSTEROSCOPY N/A 2019    Procedure: AND MYOSURE;  Surgeon: Mukund Castro MD;  Location: [facility] OR;  Service: Obstetrics/Gynecology    D & C HYSTEROSCOPY ENDOMETRIAL ABLATION N/A 2019    Procedure: DILATATION AND CURETTAGE HYSTEROSCOPY WITH NOVASURE ENDOMETRIAL ABLATION;  Surgeon: Mukund Castro  MD Jb;  Location: [facility] OR;  Service: Obstetrics/Gynecology    DILATATION AND CURETTAGE      [facility]    TONSILLECTOMY AND ADENOIDECTOMY  1978    TUBAL ABDOMINAL LIGATION Bilateral 1999     Family History   Problem Relation Age of Onset    Emphysema Father     COPD Father     Heart disease Mother     Hypertension Mother     Diabetes Mother     Arrhythmia Maternal Grandmother     Heart disease Paternal Uncle     Malig Hyperthermia Neg Hx     Breast cancer Neg Hx     Ovarian cancer Neg Hx     Uterine cancer Neg Hx     Colon cancer Neg Hx      Social History     Socioeconomic History    Marital status:    Tobacco Use    Smoking status: Former     Current packs/day: 0.00     Average packs/day: 1.5 packs/day for 19.4 years (29.1 ttl pk-yrs)     Types: Cigarettes     Start date: 8/3/1989     Quit date: 1/1/2009     Years since quitting: 15.9     Passive exposure: Never    Smokeless tobacco: Never   Vaping Use    Vaping status: Never Used   Substance and Sexual Activity    Alcohol use: Yes     Comment: once per week or less    Drug use: Never    Sexual activity: Not Currently     Partners: Male     Birth control/protection: Post-menopausal, Tubal ligation, Surgical     No Known Allergies  Current Outpatient Medications   Medication Sig Dispense Refill    amoxicillin-clavulanate (AUGMENTIN) 875-125 MG per tablet Take 1 tablet by mouth 2 (Two) Times a Day for 10 days. 20 tablet 0    aspirin 81 MG EC tablet Take 1 tablet by mouth Daily.      bisoprolol (ZEBeta) 5 MG tablet TAKE 1 TABLET BY MOUTH DAILY 90 tablet 3    buPROPion XL (WELLBUTRIN XL) 150 MG 24 hr tablet TAKE 1 TABLET BY MOUTH EVERY MORNING 90 tablet 3    clindamycin (CLEOCIN T) 1 % external solution Apply to affected area once daily 60 mL 11    Fexofenadine HCl (ALLERGY 24-HR PO) Take 1 tablet by mouth Every Morning.      ibuprofen (ADVIL,MOTRIN) 800 MG tablet Take 1 tablet by mouth Every 8 (Eight) Hours As Needed for Moderate Pain. 90 tablet 0     methylPREDNISolone (MEDROL) 4 MG dose pack Take as directed on package instructions. 21 each 0    rosuvastatin (CRESTOR) 5 MG tablet Take 1 tablet by mouth Daily. 30 tablet 5    Scopolamine 1 MG/3DAYS patch Place 1 patch on the skin as directed by provider Every 72 (Seventy-Two) Hours. 4 each 1    triamcinolone (KENALOG) 0.1 % ointment Apply 1 Application topically to the appropriate area as directed 2 (Two) Times a Day. 30 g 0    vitamin B-12 (CYANOCOBALAMIN) 1000 MCG tablet Take 1 tablet by mouth Daily. 90 tablet 1    vitamin D (ERGOCALCIFEROL) 1.25 MG (74141 UT) capsule capsule Take 1 capsule by mouth 1 (One) Time Per Week. 13 capsule 3    doxycycline (MONODOX) 50 MG capsule Take 1 capsule by mouth Daily. (Patient not taking: Reported on 10/29/2024) 90 capsule 5     No current facility-administered medications for this visit.     Review of Systems   Musculoskeletal:  Positive for arthralgias.        Right foot and ankle pain       OBJECTIVE     Vitals:    12/12/24 1501   BP: 120/77   Pulse: 78   Temp: 98 °F (36.7 °C)   SpO2: 95%       WBC   Date Value Ref Range Status   02/09/2024 7.81 3.40 - 10.80 10*3/mm3 Final   07/10/2018 9.7 3.4 - 10.8 x10E3/uL Final     RBC   Date Value Ref Range Status   02/09/2024 4.91 3.77 - 5.28 10*6/mm3 Final   07/10/2018 4.71 3.77 - 5.28 x10E6/uL Final     Hemoglobin   Date Value Ref Range Status   02/09/2024 14.5 12.0 - 15.9 g/dL Final     Hematocrit   Date Value Ref Range Status   02/09/2024 42.6 34.0 - 46.6 % Final     MCV   Date Value Ref Range Status   02/09/2024 86.8 79.0 - 97.0 fL Final     MCH   Date Value Ref Range Status   02/09/2024 29.5 26.6 - 33.0 pg Final     MCHC   Date Value Ref Range Status   02/09/2024 34.0 31.5 - 35.7 g/dL Final     RDW   Date Value Ref Range Status   02/09/2024 12.0 (L) 12.3 - 15.4 % Final     RDW-SD   Date Value Ref Range Status   02/09/2024 37.8 37.0 - 54.0 fl Final     MPV   Date Value Ref Range Status   02/09/2024 12.7 (H) 6.0 - 12.0 fL Final      Platelets   Date Value Ref Range Status   02/09/2024 206 140 - 450 10*3/mm3 Final     Neutrophil %   Date Value Ref Range Status   02/09/2024 70.3 42.7 - 76.0 % Final     Lymphocyte %   Date Value Ref Range Status   02/09/2024 22.2 19.6 - 45.3 % Final     Monocyte %   Date Value Ref Range Status   02/09/2024 5.2 5.0 - 12.0 % Final     Eosinophil %   Date Value Ref Range Status   02/09/2024 1.5 0.3 - 6.2 % Final     Basophil %   Date Value Ref Range Status   02/09/2024 0.5 0.0 - 1.5 % Final     Immature Grans %   Date Value Ref Range Status   02/09/2024 0.3 0.0 - 0.5 % Final     Neutrophils, Absolute   Date Value Ref Range Status   02/09/2024 5.49 1.70 - 7.00 10*3/mm3 Final     Lymphocytes, Absolute   Date Value Ref Range Status   02/09/2024 1.73 0.70 - 3.10 10*3/mm3 Final     Monocytes, Absolute   Date Value Ref Range Status   02/09/2024 0.41 0.10 - 0.90 10*3/mm3 Final     Eosinophils, Absolute   Date Value Ref Range Status   02/09/2024 0.12 0.00 - 0.40 10*3/mm3 Final     Basophils, Absolute   Date Value Ref Range Status   02/09/2024 0.04 0.00 - 0.20 10*3/mm3 Final     Immature Grans, Absolute   Date Value Ref Range Status   02/09/2024 0.02 0.00 - 0.05 10*3/mm3 Final     nRBC   Date Value Ref Range Status   02/09/2024 0.0 0.0 - 0.2 /100 WBC Final         Lab Results   Component Value Date    GLUCOSE 110 (H) 02/09/2024    BUN 10 02/09/2024    CREATININE 0.91 02/09/2024    EGFRIFNONA 77 08/19/2021    BCR 11.0 02/09/2024    K 4.8 02/09/2024    CO2 26.0 02/09/2024    CALCIUM 9.9 02/09/2024    ALBUMIN 4.9 02/09/2024    AST 18 02/09/2024    ALT 15 02/09/2024       Patient seen in no apparent distress.      PHYSICAL EXAM:     Foot/Ankle Exam    GENERAL  Appearance:  appears stated age  Orientation:  AAOx3  Affect:  appropriate  Gait:  unimpaired  Assistance:  independent  Right shoe gear: casual shoe  Left shoe gear: casual shoe    VASCULAR     Right Foot Vascularity   Normal vascular exam    Dorsalis pedis:   2+  Posterior tibial:  2+  Skin temperature:  warm  Edema grading:  None  CFT:  < 3 seconds  Pedal hair growth:  Present  Varicosities:  none     Left Foot Vascularity   Normal vascular exam    Dorsalis pedis:  2+  Posterior tibial:  2+  Skin temperature:  warm  Edema grading:  None  CFT:  < 3 seconds  Pedal hair growth:  Present  Varicosities:  none     NEUROLOGIC     Right Foot Neurologic   Normal sensation    Light touch sensation: normal  Vibratory sensation: normal  Hot/Cold sensation: normal  Protective Sensation using [place] Monofilament:   Sites intact: 10  Sites tested: 10     Left Foot Neurologic   Normal sensation    Light touch sensation: normal  Vibratory sensation: normal  Hot/Cold sensation:  normal  Protective Sensation using [place] Monofilament:   Sites intact: 10  Sites tested: 10    MUSCULOSKELETAL     Right Foot Musculoskeletal   Tenderness:  (To subtalar joint and right medial and lateral talus)    MUSCLE STRENGTH     Right Foot Muscle Strength   Foot dorsiflexion:  4  Foot plantar flexion:  4  Foot inversion:  4  Foot eversion:  4     Left Foot Muscle Strength   Foot dorsiflexion:  4  Foot plantar flexion:  4  Foot inversion:  4  Foot eversion:  4    RANGE OF MOTION     Right Foot Range of Motion   Foot and ankle ROM within normal limits       Left Foot Range of Motion   Foot and ankle ROM within normal limits      DERMATOLOGIC      Right Foot Dermatologic   Skin  Right foot skin is intact.      Left Foot Dermatologic   Skin  Left foot skin is intact.       RADIOLOGY:        MRI Ankle Right Without Contrast    Result Date: 12/2/2024  Narrative: MRI ANKLE RIGHT WO CONTRAST Date of Exam: 11/27/2024 3:15 PM EST Indication: right ankle since fall from ladder about 2 weeks ago.  Comparison: None available. Technique:  Routine multiplanar/multisequence images of the right ankle were obtained without contrast administration. Findings: There is an intra-articular fracture of the  talus involving the lateral aspect of the subtalar facet. Alignment is near-anatomic. There is T2 high signal consistent with marrow edema throughout the talus as well as the posterior aspect of the calcaneus near the posterior subtalar facet. There is a comminuted fracture of the lateral talar process with less than 2 mm displacement. There is a fracture through the anterior process of the calcaneus. 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 Moderate osteoarthritic changes are noted in the midfoot with prominent spurring. There is marrow edema noted in the navicular bone probably representing a combination of posttraumatic contusion and osteoarthritic change. A moderate tibiotalar joint effusion is noted. A small subtalar joint effusion is noted. Soft tissue edema is noted around the ankle. Fluid surrounds the peroneus brevis tendon proximal to the lateral malleolus. The tendon itself appears intact. Fluid surrounds the flexor hallucis tendon proximal to the medial malleolus. The tendon otherwise appears unremarkable. Other visualized tendons around the ankle appear normal. There is thickening and intermediate signal within the anterior talofibular ligament consistent with a sprain. There is a sprain of the calcaneofibular ligament. There is a partial tear of the deltoid ligament deep  fibers. Edema is noted in the sinus tarsi. The plantar fascia appears normal.     Impression: Impression: 1.Fractures of the talus and calcaneus, as detailed above. 2.Prominent osseous contusions involving the talus and calcaneus. 3.Moderate osteoarthritic changes in the midfoot. 4.Sprains of the anterior talofibular and calcaneofibular ligaments. 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  Comparison: Right knee radiographs 10/13/2024 Technique:  Routine multiplanar/multisequence images of the right knee were obtained without contrast administration. Findings: The medial and lateral menisci are normal. The articular cartilage of the medial and lateral femorotibial compartments appears grossly preserved without high-grade chondral loss. The medial and lateral supporting structures are unremarkable. The anterior cruciate ligament and posterior cruciate ligament are normal. The quadriceps tendon and patellar tendon are normal. There is diffuse chondral thinning of the patellar articular cartilage with chondromalacia and low to moderate-grade chondral irregularity and chondral fissuring. There is some chondromalacia and low-grade fissuring of the trochlear cartilage. There is a small knee joint effusion. No popliteal cyst. The posterior knee neurovasculature appears unremarkable. Normal appearance of the muscles and subcutaneous tissues. No focal bony lesion. No fracture or bony contusion.     Impression: Impression: No acute/traumatic findings. No internal derangement. Areas of moderate-grade chondral loss and chondral irregularity in the patellofemoral compartment. Small knee joint effusion. Electronically Signed: Damien Jackson MD  12/2/2024 11:50 AM EST  Workstation ID: [id number]     ASSESSMENT/PLAN     Diagnoses and all orders for this visit:    1. Closed nondisplaced fracture of body of right talus, initial encounter (Primary)    2. Traumatic closed nondisplaced fracture of calcaneus, right, initial encounter        Comprehensive lower extremity examination and evaluation was performed.    Patient to begin stretching exercises and icing in the evening as tolerated. Discussed compression therapy and resting the extremity.  Anti-inflammatory medication to begin taking if okay by PCP.    Weightbearing as  tolerated in the boot, can transition out of boot if tolerated.  Return to clinic in 1 month.    Discussed findings and treatment plan including risks, benefits, and treatment options with patient in detail. Patient agreed with treatment plan.    Medications and allergies reviewed.  Reviewed available lab values along with other pertinent labs.  These were discussed with the patient.    An After Visit Summary was printed and given to the patient at discharge, including (if requested) any available informative/educational handouts regarding diagnosis, treatment, or medications. All questions were answered to patient/family satisfaction. Should symptoms fail to improve or worsen they agree to call or return to clinic or to go to the Emergency Department. Discussed the importance of following up with any needed screening tests/labs/specialist appointments and any requested follow-up recommended by me today. Importance of maintaining follow-up discussed and patient accepts that missed appointments can delay diagnosis and potentially lead to worsening of conditions.    No follow-ups on file., or sooner if acute issues arise.    This document has been electronically signed by James Ann DPM on December 16, 2024 12:21 EST

## 2024-12-23 ENCOUNTER — TELEPHONE (OUTPATIENT)
Dept: FAMILY MEDICINE CLINIC | Facility: CLINIC | Age: 49
End: 2024-12-23
Payer: COMMERCIAL

## 2024-12-23 DIAGNOSIS — Z12.11 SCREENING FOR COLON CANCER: Primary | ICD-10-CM

## 2025-01-15 ENCOUNTER — LAB (OUTPATIENT)
Dept: LAB | Facility: HOSPITAL | Age: 50
End: 2025-01-15
Payer: COMMERCIAL

## 2025-01-15 DIAGNOSIS — E78.5 HYPERLIPIDEMIA LDL GOAL <100: ICD-10-CM

## 2025-01-15 DIAGNOSIS — E55.9 VITAMIN D DEFICIENCY: ICD-10-CM

## 2025-01-15 DIAGNOSIS — E53.8 B12 DEFICIENCY: ICD-10-CM

## 2025-01-15 DIAGNOSIS — Z51.81 MEDICATION MONITORING ENCOUNTER: ICD-10-CM

## 2025-01-15 DIAGNOSIS — R73.03 PREDIABETES: ICD-10-CM

## 2025-01-15 LAB
25(OH)D3 SERPL-MCNC: 28.7 NG/ML (ref 30–100)
ALBUMIN SERPL-MCNC: 4.1 G/DL (ref 3.5–5.2)
ALBUMIN/GLOB SERPL: 1.2 G/DL
ALP SERPL-CCNC: 83 U/L (ref 39–117)
ALT SERPL W P-5'-P-CCNC: 15 U/L (ref 1–33)
ANION GAP SERPL CALCULATED.3IONS-SCNC: 8.7 MMOL/L (ref 5–15)
AST SERPL-CCNC: 17 U/L (ref 1–32)
BASOPHILS # BLD AUTO: 0.02 10*3/MM3 (ref 0–0.2)
BASOPHILS NFR BLD AUTO: 0.4 % (ref 0–1.5)
BILIRUB SERPL-MCNC: 0.3 MG/DL (ref 0–1.2)
BUN SERPL-MCNC: 8 MG/DL (ref 6–20)
BUN/CREAT SERPL: 8.6 (ref 7–25)
CALCIUM SPEC-SCNC: 10.2 MG/DL (ref 8.6–10.5)
CHLORIDE SERPL-SCNC: 105 MMOL/L (ref 98–107)
CHOLEST SERPL-MCNC: 211 MG/DL (ref 0–200)
CO2 SERPL-SCNC: 26.3 MMOL/L (ref 22–29)
CREAT SERPL-MCNC: 0.93 MG/DL (ref 0.57–1)
DEPRECATED RDW RBC AUTO: 37.4 FL (ref 37–54)
EGFRCR SERPLBLD CKD-EPI 2021: 74.6 ML/MIN/1.73
EOSINOPHIL # BLD AUTO: 0.19 10*3/MM3 (ref 0–0.4)
EOSINOPHIL NFR BLD AUTO: 3.4 % (ref 0.3–6.2)
ERYTHROCYTE [DISTWIDTH] IN BLOOD BY AUTOMATED COUNT: 11.8 % (ref 12.3–15.4)
GLOBULIN UR ELPH-MCNC: 3.3 GM/DL
GLUCOSE SERPL-MCNC: 113 MG/DL (ref 65–99)
HBA1C MFR BLD: 6.1 % (ref 4.8–5.6)
HCT VFR BLD AUTO: 42.5 % (ref 34–46.6)
HDLC SERPL-MCNC: 47 MG/DL (ref 40–60)
HGB BLD-MCNC: 14.4 G/DL (ref 12–15.9)
IMM GRANULOCYTES # BLD AUTO: 0.02 10*3/MM3 (ref 0–0.05)
IMM GRANULOCYTES NFR BLD AUTO: 0.4 % (ref 0–0.5)
LDLC SERPL CALC-MCNC: 124 MG/DL (ref 0–100)
LDLC/HDLC SERPL: 2.53 {RATIO}
LYMPHOCYTES # BLD AUTO: 1.52 10*3/MM3 (ref 0.7–3.1)
LYMPHOCYTES NFR BLD AUTO: 27.1 % (ref 19.6–45.3)
MCH RBC QN AUTO: 29.5 PG (ref 26.6–33)
MCHC RBC AUTO-ENTMCNC: 33.9 G/DL (ref 31.5–35.7)
MCV RBC AUTO: 87.1 FL (ref 79–97)
MONOCYTES # BLD AUTO: 0.38 10*3/MM3 (ref 0.1–0.9)
MONOCYTES NFR BLD AUTO: 6.8 % (ref 5–12)
NEUTROPHILS NFR BLD AUTO: 3.48 10*3/MM3 (ref 1.7–7)
NEUTROPHILS NFR BLD AUTO: 61.9 % (ref 42.7–76)
NRBC BLD AUTO-RTO: 0 /100 WBC (ref 0–0.2)
PLATELET # BLD AUTO: 194 10*3/MM3 (ref 140–450)
PMV BLD AUTO: 11.8 FL (ref 6–12)
POTASSIUM SERPL-SCNC: 4.2 MMOL/L (ref 3.5–5.2)
PROT SERPL-MCNC: 7.4 G/DL (ref 6–8.5)
RBC # BLD AUTO: 4.88 10*6/MM3 (ref 3.77–5.28)
SODIUM SERPL-SCNC: 140 MMOL/L (ref 136–145)
TRIGL SERPL-MCNC: 225 MG/DL (ref 0–150)
VIT B12 BLD-MCNC: 637 PG/ML (ref 211–946)
VLDLC SERPL-MCNC: 40 MG/DL (ref 5–40)
WBC NRBC COR # BLD AUTO: 5.61 10*3/MM3 (ref 3.4–10.8)

## 2025-01-15 PROCEDURE — 83036 HEMOGLOBIN GLYCOSYLATED A1C: CPT

## 2025-01-15 PROCEDURE — 85025 COMPLETE CBC W/AUTO DIFF WBC: CPT

## 2025-01-15 PROCEDURE — 82607 VITAMIN B-12: CPT

## 2025-01-15 PROCEDURE — 82306 VITAMIN D 25 HYDROXY: CPT

## 2025-01-15 PROCEDURE — 80053 COMPREHEN METABOLIC PANEL: CPT

## 2025-01-15 PROCEDURE — 80061 LIPID PANEL: CPT

## 2025-01-16 ENCOUNTER — CLINICAL SUPPORT (OUTPATIENT)
Dept: GASTROENTEROLOGY | Facility: CLINIC | Age: 50
End: 2025-01-16
Payer: COMMERCIAL

## 2025-01-16 ENCOUNTER — PREP FOR SURGERY (OUTPATIENT)
Dept: OTHER | Facility: HOSPITAL | Age: 50
End: 2025-01-16
Payer: COMMERCIAL

## 2025-01-16 ENCOUNTER — TELEPHONE (OUTPATIENT)
Dept: GASTROENTEROLOGY | Facility: CLINIC | Age: 50
End: 2025-01-16
Payer: COMMERCIAL

## 2025-01-16 DIAGNOSIS — Z12.11 ENCOUNTER FOR SCREENING FOR MALIGNANT NEOPLASM OF COLON: Primary | ICD-10-CM

## 2025-01-16 RX ORDER — POLYETHYLENE GLYCOL 3350, SODIUM SULFATE ANHYDROUS, SODIUM BICARBONATE, SODIUM CHLORIDE, POTASSIUM CHLORIDE 236; 22.74; 6.74; 5.86; 2.97 G/4L; G/4L; G/4L; G/4L; G/4L
4 POWDER, FOR SOLUTION ORAL ONCE
Qty: 4000 ML | Refills: 0 | Status: SHIPPED | OUTPATIENT
Start: 2025-01-16 | End: 2025-01-17

## 2025-01-16 NOTE — PROGRESS NOTES
Maria Ines Garcia  1975  49 y.o.    Reason for call: Screening Colonoscopy  Prep prescribed: Fidencio - pt lupillo a m  Prep instructions reviewed with patient and sent to patient via [facility]  Is the patient currently on any injectable or oral medications for weight loss or diabetes? No  Clearance needed? No  If yes, what clearance is needed? N/A  Clearance has been requested from n/a  The patient has been scheduled for: Colonoscopy    Maria Ines Garcia is aware they have been scheduled for a screening colonoscopy. Patient has expressed they are not having any symptoms at all.       After your procedure, you will be contacted with results. Please confirm the best phone # to reach the patient: [phone]  Family history of colon cancer? No  If yes, indicate relative: na  Tentative Procedure Date: 2025    Family History   Problem Relation Age of Onset    Emphysema Father     COPD Father     Heart disease Mother     Hypertension Mother     Diabetes Mother     Arrhythmia Maternal Grandmother     Heart disease Paternal Uncle     Malig Hyperthermia Neg Hx     Breast cancer Neg Hx     Ovarian cancer Neg Hx     Uterine cancer Neg Hx     Colon cancer Neg Hx      Past Medical History:   Diagnosis Date    Abnormal Pap smear of cervix 2024    LGSIL    Anxiety     COVID-19 2023    Diverticulitis of colon     GI (gastrointestinal bleed)     Heavy periods     History of hepatitis B     Hyperlipidemia     Hypertension     Infectious viral hepatitis     Hep B    Rosacea     Seasonal allergies     Uterine fibroid      No Known Allergies  Past Surgical History:   Procedure Laterality Date     SECTION      COLPOSCOPY  2024    CRANIOPLASTY FOR CRANIAL DEFECT      performed to design plates, 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,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    D & C HYSTEROSCOPY N/A 2019    Procedure: AND MYOSURE;  Surgeon: Mukund Castro MD;  Location: [facility] OR;   Service: Obstetrics/Gynecology    D & C HYSTEROSCOPY ENDOMETRIAL ABLATION N/A 2019    Procedure: DILATATION AND CURETTAGE HYSTEROSCOPY WITH NOVASURE ENDOMETRIAL ABLATION;  Surgeon: Mukund Castro MD;  Location: [facility] OR;  Service: Obstetrics/Gynecology    DILATATION AND CURETTAGE      [facility]    TONSILLECTOMY AND ADENOIDECTOMY  1978    TUBAL ABDOMINAL LIGATION Bilateral      Social History     Socioeconomic History    Marital status:    Tobacco Use    Smoking status: Former     Current packs/day: 0.00     Average packs/day: 1.5 packs/day for 19.4 years (29.1 ttl pk-yrs)     Types: Cigarettes     Start date: 8/3/1989     Quit date: 2009     Years since quittin.0     Passive exposure: Never    Smokeless tobacco: Never   Vaping Use    Vaping status: Never Used   Substance and Sexual Activity    Alcohol use: Not Currently     Comment: once per week or less    Drug use: Never    Sexual activity: Not Currently     Partners: Male     Birth control/protection: Tubal ligation, Surgical       Current Outpatient Medications:     aspirin 81 MG EC tablet, Take 1 tablet by mouth Daily., Disp: , Rfl:     bisoprolol (ZEBeta) 5 MG tablet, TAKE 1 TABLET BY MOUTH DAILY, Disp: 90 tablet, Rfl: 3    buPROPion XL (WELLBUTRIN XL) 150 MG 24 hr tablet, TAKE 1 TABLET BY MOUTH EVERY MORNING, Disp: 90 tablet, Rfl: 3    clindamycin (CLEOCIN T) 1 % external solution, Apply to affected area once daily, Disp: 60 mL, Rfl: 11    doxycycline (MONODOX) 50 MG capsule, Take 1 capsule by mouth Daily., Disp: 90 capsule, Rfl: 5    Fexofenadine HCl (ALLERGY 24-HR PO), Take 1 tablet by mouth Every Morning., Disp: , Rfl:     ibuprofen (ADVIL,MOTRIN) 800 MG tablet, Take 1 tablet by mouth Every 8 (Eight) Hours As Needed for Moderate Pain., Disp: 90 tablet, Rfl: 0    methylPREDNISolone (MEDROL) 4 MG dose pack, Take as directed on package instructions., Disp: 21 each, Rfl: 0    rosuvastatin (CRESTOR) 5 MG tablet, Take 1  tablet by mouth Daily., Disp: 30 tablet, Rfl: 5    Scopolamine 1 MG/3DAYS patch, Place 1 patch on the skin as directed by provider Every 72 (Seventy-Two) Hours., Disp: 4 each, Rfl: 1    triamcinolone (KENALOG) 0.1 % ointment, Apply 1 Application topically to the appropriate area as directed 2 (Two) Times a Day., Disp: 30 g, Rfl: 0    vitamin B-12 (CYANOCOBALAMIN) 1000 MCG tablet, Take 1 tablet by mouth Daily., Disp: 90 tablet, Rfl: 1    vitamin D (ERGOCALCIFEROL) 1.25 MG (17443 UT) capsule capsule, Take 1 capsule by mouth 1 (One) Time Per Week., Disp: 13 capsule, Rfl: 3

## 2025-01-16 NOTE — TELEPHONE ENCOUNTER
Called patient for nurse/ma visit, no answer. LVM letting patient know I was calling bc she had an appt to schedule a telehealth and if she cannot make this call to please reschedule her appt.

## 2025-01-17 ENCOUNTER — TELEPHONE (OUTPATIENT)
Dept: FAMILY MEDICINE CLINIC | Facility: CLINIC | Age: 50
End: 2025-01-17
Payer: COMMERCIAL

## 2025-01-17 NOTE — TELEPHONE ENCOUNTER
Carmen Spoke with Patient. She is concerned about her cholesterol and wanted to know if increasing her medication dose would be beneficial with her family history of heart problems. She is also concerned with the abnormal result of her RDW in the CBC and Differential. She would like to speak with you about both of these things at her follow-up appointment.     Dr Tran will follow up and discuss further at next OV 1/28

## 2025-01-17 NOTE — TELEPHONE ENCOUNTER
Patient called requesting her lab results. She is most concerned about her CBC results.  Please call and advise.

## 2025-01-22 ENCOUNTER — TELEPHONE (OUTPATIENT)
Dept: OBSTETRICS AND GYNECOLOGY | Facility: CLINIC | Age: 50
End: 2025-01-22

## 2025-01-22 NOTE — TELEPHONE ENCOUNTER
Amber    Yes - appointment to evaluate problem is necessary.  Prolapse is not an emergency.      Thanks    Matthew

## 2025-02-24 ENCOUNTER — PATIENT MESSAGE (OUTPATIENT)
Dept: FAMILY MEDICINE CLINIC | Facility: CLINIC | Age: 50
End: 2025-02-24
Payer: COMMERCIAL

## 2025-02-25 ENCOUNTER — TELEPHONE (OUTPATIENT)
Dept: FAMILY MEDICINE CLINIC | Facility: CLINIC | Age: 50
End: 2025-02-25
Payer: COMMERCIAL

## 2025-02-25 NOTE — TELEPHONE ENCOUNTER
Phone call placed to patient with update that the provider is requesting her to come in for an appointment today with patient stated that she cannot today. Appointment made for Thursday for follow up. Patient instructed on follow up with her PCP regarding making appointments from last visit and communicating her needs as soon as she hs them with voiced agreement. Patient states that her concern is getting in her colonoscopy quicker so she can know what is going on as she believes she has diverticulitis. Educated patient on medication regime as the patient stated that she had been taking her Ibuprofen more than 3 times a day. Patient verbalized understanding of follow up and process. Requested patient to keep her appointment on Thursday with agreement to do so. Follow up to her PCP and referral team.

## 2025-02-25 NOTE — TELEPHONE ENCOUNTER
Please contact this patient and make her an appointment.  She needs to be seen for further evaluation in this regard as I am not able to assess her fully over the messages that I am receiving and we will need to review the chart to see what we can do to help her out.  Please see about getting her in this afternoon.

## 2025-02-26 ENCOUNTER — TELEPHONE (OUTPATIENT)
Dept: GASTROENTEROLOGY | Facility: CLINIC | Age: 50
End: 2025-02-26
Payer: COMMERCIAL

## 2025-02-26 NOTE — TELEPHONE ENCOUNTER
S/w pt, she verbalized understanding that PEBBLES Rojas has reached out to her PCP, he would like to see her for an evaluation. She verbalized understanding and will call her PCP, she states she will not be able to go to her appt with him tomorrow.

## 2025-02-26 NOTE — TELEPHONE ENCOUNTER
Hub staff attempted to follow warm transfer process and was unsuccessful     Caller: Maria Ines Garcia    Relationship to patient: Self    Best call back number: [phone]    Patient is needing: PATIENT CALLED REQUESTING A CALL BACK FROM [facility] TO SEE ABOUT BUMPING UP HER COLONOSCOPY APPT

## 2025-03-04 ENCOUNTER — OFFICE VISIT (OUTPATIENT)
Dept: FAMILY MEDICINE CLINIC | Facility: CLINIC | Age: 50
End: 2025-03-04
Payer: COMMERCIAL

## 2025-03-04 VITALS
DIASTOLIC BLOOD PRESSURE: 80 MMHG | BODY MASS INDEX: 29.89 KG/M2 | TEMPERATURE: 97.9 F | HEART RATE: 91 BPM | WEIGHT: 175.1 LBS | SYSTOLIC BLOOD PRESSURE: 122 MMHG | HEIGHT: 64 IN | OXYGEN SATURATION: 96 %

## 2025-03-04 DIAGNOSIS — R19.7 DIARRHEA, UNSPECIFIED TYPE: ICD-10-CM

## 2025-03-04 DIAGNOSIS — Z51.81 MEDICATION MONITORING ENCOUNTER: ICD-10-CM

## 2025-03-04 DIAGNOSIS — K25.3 ACUTE GASTRIC ULCER, UNSPECIFIED WHETHER GASTRIC ULCER HEMORRHAGE OR PERFORATION PRESENT: ICD-10-CM

## 2025-03-04 DIAGNOSIS — N95.1 MENOPAUSAL SYMPTOMS: ICD-10-CM

## 2025-03-04 DIAGNOSIS — R19.5 DARK STOOLS: ICD-10-CM

## 2025-03-04 DIAGNOSIS — F32.81 PREMENSTRUAL DYSPHORIC DISORDER: ICD-10-CM

## 2025-03-04 DIAGNOSIS — R10.32 LEFT LOWER QUADRANT PAIN: Primary | ICD-10-CM

## 2025-03-04 DIAGNOSIS — J06.9 UPPER RESPIRATORY TRACT INFECTION, UNSPECIFIED TYPE: ICD-10-CM

## 2025-03-04 LAB
ALBUMIN SERPL-MCNC: 4.1 G/DL (ref 3.5–5.2)
ALBUMIN/GLOB SERPL: 1.2 G/DL
ALP SERPL-CCNC: 116 U/L (ref 39–117)
ALT SERPL W P-5'-P-CCNC: 17 U/L (ref 1–33)
ANION GAP SERPL CALCULATED.3IONS-SCNC: 11 MMOL/L (ref 5–15)
AST SERPL-CCNC: 20 U/L (ref 1–32)
BASOPHILS # BLD AUTO: 0.02 10*3/MM3 (ref 0–0.2)
BASOPHILS NFR BLD AUTO: 0.2 % (ref 0–1.5)
BILIRUB SERPL-MCNC: <0.2 MG/DL (ref 0–1.2)
BUN SERPL-MCNC: 6 MG/DL (ref 6–20)
BUN/CREAT SERPL: 7.2 (ref 7–25)
CALCIUM SPEC-SCNC: 10.1 MG/DL (ref 8.6–10.5)
CHLORIDE SERPL-SCNC: 102 MMOL/L (ref 98–107)
CO2 SERPL-SCNC: 28 MMOL/L (ref 22–29)
CREAT SERPL-MCNC: 0.83 MG/DL (ref 0.57–1)
CRP SERPL-MCNC: 9.63 MG/DL (ref 0–0.5)
DEPRECATED RDW RBC AUTO: 37.2 FL (ref 37–54)
EGFRCR SERPLBLD CKD-EPI 2021: 85.5 ML/MIN/1.73
EOSINOPHIL # BLD AUTO: 0.04 10*3/MM3 (ref 0–0.4)
EOSINOPHIL NFR BLD AUTO: 0.4 % (ref 0.3–6.2)
ERYTHROCYTE [DISTWIDTH] IN BLOOD BY AUTOMATED COUNT: 11.8 % (ref 12.3–15.4)
GLOBULIN UR ELPH-MCNC: 3.3 GM/DL
GLUCOSE SERPL-MCNC: 104 MG/DL (ref 65–99)
HCT VFR BLD AUTO: 41.2 % (ref 34–46.6)
HGB BLD-MCNC: 13.8 G/DL (ref 12–15.9)
IMM GRANULOCYTES # BLD AUTO: 0.08 10*3/MM3 (ref 0–0.05)
IMM GRANULOCYTES NFR BLD AUTO: 0.8 % (ref 0–0.5)
LIPASE SERPL-CCNC: 27 U/L (ref 13–60)
LYMPHOCYTES # BLD AUTO: 2.2 10*3/MM3 (ref 0.7–3.1)
LYMPHOCYTES NFR BLD AUTO: 22.7 % (ref 19.6–45.3)
MCH RBC QN AUTO: 28.9 PG (ref 26.6–33)
MCHC RBC AUTO-ENTMCNC: 33.5 G/DL (ref 31.5–35.7)
MCV RBC AUTO: 86.4 FL (ref 79–97)
MONOCYTES # BLD AUTO: 0.76 10*3/MM3 (ref 0.1–0.9)
MONOCYTES NFR BLD AUTO: 7.9 % (ref 5–12)
NEUTROPHILS NFR BLD AUTO: 6.58 10*3/MM3 (ref 1.7–7)
NEUTROPHILS NFR BLD AUTO: 68 % (ref 42.7–76)
NRBC BLD AUTO-RTO: 0 /100 WBC (ref 0–0.2)
PLATELET # BLD AUTO: 345 10*3/MM3 (ref 140–450)
PMV BLD AUTO: 10.9 FL (ref 6–12)
POTASSIUM SERPL-SCNC: 4.6 MMOL/L (ref 3.5–5.2)
PROT SERPL-MCNC: 7.4 G/DL (ref 6–8.5)
RBC # BLD AUTO: 4.77 10*6/MM3 (ref 3.77–5.28)
SODIUM SERPL-SCNC: 141 MMOL/L (ref 136–145)
WBC NRBC COR # BLD AUTO: 9.68 10*3/MM3 (ref 3.4–10.8)

## 2025-03-04 PROCEDURE — 86140 C-REACTIVE PROTEIN: CPT | Performed by: FAMILY MEDICINE

## 2025-03-04 PROCEDURE — 83690 ASSAY OF LIPASE: CPT | Performed by: FAMILY MEDICINE

## 2025-03-04 PROCEDURE — 99214 OFFICE O/P EST MOD 30 MIN: CPT | Performed by: FAMILY MEDICINE

## 2025-03-04 PROCEDURE — 85025 COMPLETE CBC W/AUTO DIFF WBC: CPT | Performed by: FAMILY MEDICINE

## 2025-03-04 PROCEDURE — 80053 COMPREHEN METABOLIC PANEL: CPT | Performed by: FAMILY MEDICINE

## 2025-03-04 RX ORDER — SUCRALFATE ORAL 1 G/10ML
1 SUSPENSION ORAL 4 TIMES DAILY
Qty: 473 ML | Refills: 0 | Status: SHIPPED | OUTPATIENT
Start: 2025-03-04

## 2025-03-04 RX ORDER — ROSUVASTATIN CALCIUM 5 MG/1
5 TABLET, COATED ORAL DAILY
Qty: 90 TABLET | Refills: 3 | Status: SHIPPED | OUTPATIENT
Start: 2025-03-04

## 2025-03-04 RX ORDER — OMEPRAZOLE 40 MG/1
40 CAPSULE, DELAYED RELEASE ORAL 2 TIMES DAILY
Qty: 60 CAPSULE | Refills: 1 | Status: SHIPPED | OUTPATIENT
Start: 2025-03-04

## 2025-03-04 RX ORDER — ASPIRIN 81 MG/1
81 TABLET ORAL DAILY
Qty: 90 TABLET | Refills: 3 | Status: SHIPPED | OUTPATIENT
Start: 2025-03-04

## 2025-03-04 RX ORDER — BUPROPION HYDROCHLORIDE 150 MG/1
150 TABLET ORAL EVERY MORNING
Qty: 90 TABLET | Refills: 3 | Status: SHIPPED | OUTPATIENT
Start: 2025-03-04 | End: 2026-03-04

## 2025-03-04 RX ORDER — LANOLIN ALCOHOL/MO/W.PET/CERES
1000 CREAM (GRAM) TOPICAL DAILY
Qty: 90 TABLET | Refills: 1 | Status: SHIPPED | OUTPATIENT
Start: 2025-03-04

## 2025-03-04 NOTE — PROGRESS NOTES
Chief Complaint  Follow-up (Dark stool and stomach cramps prior to bowel movement.) and Constipation (At times, then diarrhea.)    Subjective          Maria Ines Garcia presents to [facility]    History of Present Illness     History of Present Illness  The patient is a 49-year-old female who presents today to discuss concerns about dark stool.    She reports experiencing loose, dark stools with occasional blood and mucus. She describes her stools as non-formed, bloody, and mucusy, rating them as a 5 or 6 on the [place] stool chart. She has been experiencing lower abdominal pain for the past 4 weeks. She does not experience any upper abdominal pain. She has not undergone a colonoscopy or CT scan. She has not been diagnosed with Crohn's disease or ulcerative colitis. She reports no dysuria. She has no known history of peptic ulcers. She reports no food allergies or associated symptoms such as numbness, tingling, or rash. She has not identified any specific foods that exacerbate her condition.    She has a history of diverticulitis, which was self-diagnosed approximately 6 to 7 years ago. She has not had a CT scan or colonoscopy. She was treated with antibiotics at that time, which she reports as beneficial. She has discontinued the use of nuts in her diet following her previous treatment for diverticulitis. She was unaware of the potential for ibuprofen to cause inflammation in diverticulitis. She has been using ibuprofen for ankle pain but discontinued it over a month ago. She reports no improvement in her symptoms since discontinuing ibuprofen.    She has a history of hemorrhoids, which have not been formally diagnosed. She retains her appendix and gallbladder. She experiences constipation, often going 3 to 4 days without a bowel movement, followed by small amounts of diarrhea. She occasionally observes bloody mucus in her stool when she coughs hard.    She has been prescribed  "Wellbutrin by her OB-GYN for hormonal management and is seeking a refill. She has attempted to discontinue the medication but found it challenging.    She went to urgent care for a viral head cold and was checked for COVID-19 and influenza on 02/26/2025, both of which were negative. She returned on 03/02/2025 and was prescribed amoxicillin, which has been helpful for her upper respiratory infection.    She is requesting refills for aspirin, Crestor, and B12.    MEDICATIONS  Current: Aspirin, Crestor, B12, Wellbutrin, amoxicillin         Current Outpatient Medications   Medication Instructions    amoxicillin (AMOXIL) 500 mg, Oral, 2 Times Daily    aspirin 81 mg, Oral, Daily    bisoprolol (ZEBeta) 5 MG tablet TAKE 1 TABLET BY MOUTH DAILY    buPROPion XL (WELLBUTRIN XL) 150 MG 24 hr tablet TAKE 1 TABLET BY MOUTH EVERY MORNING    Fexofenadine HCl (ALLERGY 24-HR PO) 1 tablet, Every Morning    omeprazole (PRILOSEC) 40 mg, Oral, 2 Times Daily    rosuvastatin (CRESTOR) 5 mg, Oral, Daily    sucralfate (CARAFATE) 1 g, Oral, 4 Times Daily    vitamin B-12 (CYANOCOBALAMIN) 1,000 mcg, Oral, Daily    vitamin D (ERGOCALCIFEROL) 50,000 Units, Oral, Weekly       The following portions of the patient's history were reviewed and updated as appropriate: allergies, current medications, past family history, past medical history, past social history, past surgical history, and problem list.    Objective   Vital Signs:   /80   Pulse 91   Temp 97.9 °F (36.6 °C) (Oral)   Ht 162.6 cm (64\")   Wt 79.4 kg (175 lb 1.6 oz)   SpO2 96%   BMI 30.06 kg/m²     BP Readings from Last 3 Encounters:   03/04/25 122/80   03/02/25 136/75   02/26/25 142/88     Wt Readings from Last 3 Encounters:   03/04/25 79.4 kg (175 lb 1.6 oz)   03/02/25 78.9 kg (174 lb)   02/26/25 80.1 kg (176 lb 8 oz)           Physical Exam  Vitals reviewed.   Constitutional:       Appearance: Normal appearance.   HENT:      Head: Normocephalic and atraumatic.      Right Ear: " External ear normal.      Left Ear: External ear normal.      Nose: Nose normal.   Eyes:      Conjunctiva/sclera: Conjunctivae normal.   Cardiovascular:      Rate and Rhythm: Normal rate and regular rhythm.      Heart sounds: No murmur heard.     No friction rub. No gallop.   Pulmonary:      Effort: Pulmonary effort is normal.      Breath sounds: Normal breath sounds. No wheezing or rhonchi.   Abdominal:      General: Bowel sounds are normal.      Palpations: Abdomen is soft. There is no mass.      Comments: Abdomen appears slightly distended with tenderness noted in the left lower quadrant region.  No epigastric or right upper quadrant tenderness.  No right lower quadrant tenderness.   Skin:     General: Skin is warm and dry.   Neurological:      Mental Status: She is alert and oriented to person, place, and time.      Cranial Nerves: No cranial nerve deficit.   Psychiatric:         Mood and Affect: Mood and affect normal.         Behavior: Behavior normal.         Thought Content: Thought content normal.         Judgment: Judgment normal.            Result Review :   The following data was reviewed by: Lennox Tran DO on 03/04/2025:  Common labs          1/15/2025    08:51   Common Labs   Glucose 113    BUN 8    Creatinine 0.93    Sodium 140    Potassium 4.2    Chloride 105    Calcium 10.2    Albumin 4.1    Total Bilirubin 0.3    Alkaline Phosphatase 83    AST (SGOT) 17    ALT (SGPT) 15    WBC 5.61    Hemoglobin 14.4    Hematocrit 42.5    Platelets 194    Total Cholesterol 211    Triglycerides 225    HDL Cholesterol 47    LDL Cholesterol  124    Hemoglobin A1C 6.10             Lab Results   Component Value Date    SARSANTIGEN Not Detected 02/26/2025    FLUAAG Not Detected 02/26/2025    FLUBAG Not Detected 02/26/2025    RAPSCRN Negative 03/02/2025    POCPREGUR Negative 11/29/2019    BILIRUBINUR Negative 11/21/2024       Results      Procedures        Assessment and Plan    Diagnoses and all orders for this  visit:    1. Left lower quadrant pain (Primary)  -     CT Abdomen Pelvis With Contrast; Future  -     Lipase  -     CBC & Differential  -     Comprehensive Metabolic Panel  -     C-reactive protein    2. Premenstrual dysphoric disorder    3. Menopausal symptoms  -     buPROPion XL (WELLBUTRIN XL) 150 MG 24 hr tablet; TAKE 1 TABLET BY MOUTH EVERY MORNING  Dispense: 90 tablet; Refill: 3    4. Upper respiratory tract infection, unspecified type    5. Diarrhea, unspecified type  -     H. Pylori Antigen, Stool - Stool, Per Rectum; Future  -     Pancreatic Elastase, Fecal - Stool, Per Rectum; Future  -     Calprotectin, Fecal - Stool, Per Rectum; Future  -     Fecal Lactoferrin Qual. - Stool, Per Rectum; Future  -     Occult Blood, Fecal By Immunoassay - Stool, Per Rectum; Future  -     Clostridioides difficile Toxin, PCR - Stool, Per Rectum; Future  -     Enteric Parasite Panel - Stool, Per Rectum; Future  -     Enteric Bacterial Panel - Stool, Per Rectum; Future  -     CT Abdomen Pelvis With Contrast; Future  -     CBC & Differential  -     Comprehensive Metabolic Panel    6. Dark stools  -     CT Abdomen Pelvis With Contrast; Future  -     CBC & Differential  -     Comprehensive Metabolic Panel    7. Medication monitoring encounter  -     CBC & Differential  -     Comprehensive Metabolic Panel  -     C-reactive protein    8. Acute gastric ulcer, unspecified whether gastric ulcer hemorrhage or perforation present    Other orders  -     vitamin B-12 (CYANOCOBALAMIN) 1000 MCG tablet; Take 1 tablet by mouth Daily.  Dispense: 90 tablet; Refill: 1  -     aspirin 81 MG EC tablet; Take 1 tablet by mouth Daily.  Dispense: 90 tablet; Refill: 3  -     rosuvastatin (CRESTOR) 5 MG tablet; Take 1 tablet by mouth Daily.  Dispense: 90 tablet; Refill: 3  -     sucralfate (Carafate) 1 GM/10ML suspension; Take 10 mL by mouth 4 (Four) Times a Day.  Dispense: 473 mL; Refill: 0  -     omeprazole (priLOSEC) 40 MG capsule; Take 1 capsule by  mouth 2 (Two) Times a Day.  Dispense: 60 capsule; Refill: 1        Assessment & Plan  1. Left lower quadrant abdominal pain.  The patient's symptoms include left lower quadrant abdominal pain, dark stools, and bloody mucus in the stool. There is concern for a potential stomach bleeding issue, possibly due to excessive ibuprofen intake. A stat CT scan of the abdomen and pelvis with contrast has been ordered to be scheduled for tomorrow to investigate the cause of the pain. Carafate liquid, to be taken 4 times daily, and omeprazole 20 mg, to be taken twice daily, have been prescribed to manage potential stomach bleeding. Blood work, including CBC, CMP, lipase, and CRP, will be conducted to check for inflammation and other abnormalities. Stool studies will be performed to check for H. pylori, elastase, pancreatic insufficiency, inflammation in the colon, blood, C. difficile, parasites, and bacteria.    2. Dark stools.  The patient reports dark stools, which may indicate a gastrointestinal bleeding issue. Stool studies will be performed to check for H. pylori, elastase, pancreatic insufficiency, inflammation in the colon, blood, C. difficile, parasites, and bacteria. A stat CT scan of the abdomen and pelvis with contrast has been ordered to further investigate the cause. Carafate liquid and omeprazole 40 mg have been prescribed to manage potential stomach bleeding.    3. Bloody mucus in stool.  The patient reports bloody mucus in the stool, which may be related to a gastrointestinal issue. Stool studies will be performed to check for H. pylori, elastase, pancreatic insufficiency, inflammation in the colon, blood, C. difficile, parasites, and bacteria. A stat CT scan of the abdomen and pelvis with contrast has been ordered to further investigate the cause. Carafate liquid and omeprazole 40 mg have been prescribed to manage potential gastric ulceration.    4. Medication management.  Prescriptions for aspirin, Crestor,  "B12, and Wellbutrin have been refilled. The patient is advised to continue these medications as prescribed.    5. Upper respiratory infection.  The patient was previously diagnosed with an upper respiratory infection and was prescribed amoxicillin, which has been helpful. She is advised to continue the course of antibiotics as prescribed by the urgent care clinic.    6. Suspected stomach ulcer.  There is concern for a potential stomach ulcer due to the patient's symptoms and history of ibuprofen use. Carafate liquid and omeprazole 20 mg have been prescribed to manage potential stomach bleeding. A stat CT scan of the abdomen and pelvis with contrast has been ordered to further investigate the cause. Blood work, including CBC, CMP, lipase, and CRP, will be conducted to check for inflammation and other abnormalities.    She has never formally been diagnosed with diverticulitis although has taken antibiotics on occasion for \"flareups\" of diverticulitis.  I discussed that she really needs a CT for further evaluation.  Given ongoing symptoms and lack of response to Augmentin I think this would be prudent.  I also think in the future rather than just having colonoscopy she would need to be considered for a EGD as well.  I had reached out to Darline Boland with GI to discuss the case prior to patient coming in as there was some confusion in there being a request for patient to have the colonoscopy moved up.  If she does have diverticulitis it is best to wait a few weeks to allow the diverticulitis to improve prior to doing colonoscopy.  Once the CT has been completed I plan to be in contact with the patient as well as in contact with GI to determine the next best course of action.    Follow-up  The patient will follow up in 1 month.       Medications Discontinued During This Encounter   Medication Reason    aspirin 81 MG EC tablet Reorder    vitamin B-12 (CYANOCOBALAMIN) 1000 MCG tablet Reorder    buPROPion XL " (WELLBUTRIN XL) 150 MG 24 hr tablet Reorder    rosuvastatin (CRESTOR) 5 MG tablet Reorder          Follow Up   Return in about 1 month (around 4/4/2025) for loose stools.  Patient was given instructions and counseling regarding her condition or for health maintenance advice. Please see specific information pulled into the AVS if appropriate.     Patient or patient representative verbalized consent for the use of Ambient Listening during the visit with  Lennox Tran DO for chart documentation. 3/4/2025  09:48 EST    Lennox Tran DO  03/04/25  10:16 EST

## 2025-03-05 ENCOUNTER — HOSPITAL ENCOUNTER (OUTPATIENT)
Dept: CT IMAGING | Facility: HOSPITAL | Age: 50
Discharge: HOME | End: 2025-03-05
Admitting: FAMILY MEDICINE
Payer: COMMERCIAL

## 2025-03-05 DIAGNOSIS — R19.5 DARK STOOLS: ICD-10-CM

## 2025-03-05 DIAGNOSIS — R10.32 LEFT LOWER QUADRANT PAIN: ICD-10-CM

## 2025-03-05 DIAGNOSIS — R19.7 DIARRHEA, UNSPECIFIED TYPE: ICD-10-CM

## 2025-03-05 DIAGNOSIS — K57.32 SIGMOID DIVERTICULITIS: Primary | ICD-10-CM

## 2025-03-05 PROCEDURE — 74177 CT ABD & PELVIS W/CONTRAST: CPT

## 2025-03-05 PROCEDURE — 25510000001 IOPAMIDOL PER 1 ML: Performed by: FAMILY MEDICINE

## 2025-03-05 RX ORDER — IOPAMIDOL 755 MG/ML
100 INJECTION, SOLUTION INTRAVASCULAR
Status: COMPLETED | OUTPATIENT
Start: 2025-03-05 | End: 2025-03-05

## 2025-03-05 RX ORDER — CIPROFLOXACIN 500 MG/1
500 TABLET, FILM COATED ORAL 2 TIMES DAILY
Qty: 28 TABLET | Refills: 0 | Status: SHIPPED | OUTPATIENT
Start: 2025-03-05 | End: 2025-03-20

## 2025-03-05 RX ORDER — METRONIDAZOLE 500 MG/1
500 TABLET ORAL 3 TIMES DAILY
Qty: 42 TABLET | Refills: 0 | Status: SHIPPED | OUTPATIENT
Start: 2025-03-05 | End: 2025-03-20

## 2025-03-05 RX ADMIN — IOPAMIDOL 100 ML: 755 INJECTION, SOLUTION INTRAVENOUS at 18:52

## 2025-03-07 ENCOUNTER — TELEPHONE (OUTPATIENT)
Dept: GASTROENTEROLOGY | Facility: CLINIC | Age: 50
End: 2025-03-07
Payer: COMMERCIAL

## 2025-03-07 NOTE — TELEPHONE ENCOUNTER
Attempted to call patient to schedule next available Office Visit appointment for new patient with Darline ISAAC on 03.11.25 @ 9:00. [facility] 2x    
Patient is scheduled with Darline ROGEL, 3.11.2025 9:00AM.   
daughter

## 2025-03-11 ENCOUNTER — OFFICE VISIT (OUTPATIENT)
Dept: GASTROENTEROLOGY | Facility: CLINIC | Age: 50
End: 2025-03-11
Payer: COMMERCIAL

## 2025-03-11 VITALS
HEIGHT: 64 IN | BODY MASS INDEX: 30.83 KG/M2 | DIASTOLIC BLOOD PRESSURE: 68 MMHG | HEART RATE: 77 BPM | WEIGHT: 180.6 LBS | SYSTOLIC BLOOD PRESSURE: 105 MMHG

## 2025-03-11 DIAGNOSIS — K57.92 DIVERTICULITIS: ICD-10-CM

## 2025-03-11 DIAGNOSIS — K92.1 BLOOD IN STOOL: ICD-10-CM

## 2025-03-11 DIAGNOSIS — K59.04 CHRONIC IDIOPATHIC CONSTIPATION: Primary | ICD-10-CM

## 2025-03-11 PROCEDURE — 99214 OFFICE O/P EST MOD 30 MIN: CPT | Performed by: NURSE PRACTITIONER

## 2025-03-11 RX ORDER — DOCUSATE SODIUM 100 MG/1
100 CAPSULE, LIQUID FILLED ORAL 2 TIMES DAILY
Qty: 60 CAPSULE | Refills: 3 | Status: SHIPPED | OUTPATIENT
Start: 2025-03-11

## 2025-03-11 NOTE — PROGRESS NOTES
Patient Name: Maria Ines Garcia   Visit Date: 03/11/2025   Patient ID: [id number]  Provider: PEBBLES Castaneda    Sex: female  Location:  Location Address:  Location Phone: [address]  [place] [address] [phone]    YOB: 1975  Age: 49 y.o.   Primary Care Provider Lennox Tran DO      Referring Provider: Lennox Tran DO        Chief Complaint  Diverticulitis    History of Present Illness  New pt presents with hx diverticulitis, first time was many years ago. Pt states she's had a bloody BM with mucus since Jan, pt states she has abd pain and back pain , + constipation, has taken laxatives and had a better BM w/o blood.   Patient treated for suspected diverticulitis with Augmentin prior to CT by urgent care, pt was taking Ibuprofen 800 BID d/t fall around the same time.   CT abdomen and pelvis with contrast 3/5/2025: Inflammation surrounding a sigmoid diverticulum consistent with diverticulitis.  She was given Cipro and Flagyl for 14 days by primary care    Pt feels she is doing better with Cipro and Flagyl, [place] # 6-7, still with some blood seen in water. Pt states she has urge to have BM with cramps/strain, but only passes small amount of stool. Currently having a daily BM but states she does have hx of chronic constipation and interested in taking something for this.   Pt has never had a colonoscopy   Pt has been on omeprazole - just given last week and Carafate. Pt states she is not having HB/dysphagia, denies n/v. No abd pain w meals and following regular diet now, has lower abd pain with BM.   Pt denies black stool, states it has looked dark to her at times, and this is why PCP rx'd Omeprazole. Denies tarry stools. Normal Hgb.   Pt has lost 15-20# since November, pt has been eating less since sx started in Jan and she started a new job and has been more active    Past Medical History:   Diagnosis Date    Abnormal Pap smear of cervix 06/03/2024    LGSIL    Allergic      Anxiety     COVID-19 2023    Diverticulitis of colon     Diverticulosis     GI (gastrointestinal bleed)     Heavy periods     History of hepatitis B     Hyperlipidemia     Hypertension     Infectious viral hepatitis     Hep B    Rosacea     Seasonal allergies     Uterine fibroid     Varicella        Past Surgical History:   Procedure Laterality Date     SECTION  1992    COLPOSCOPY  2024    CRANIOPLASTY FOR CRANIAL DEFECT      performed to design plates, 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,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    D & C HYSTEROSCOPY N/A 2019    Procedure: AND MYOSURE;  Surgeon: Mukund Castro MD;  Location: [facility] OR;  Service: Obstetrics/Gynecology    D & C HYSTEROSCOPY ENDOMETRIAL ABLATION N/A 2019    Procedure: DILATATION AND CURETTAGE HYSTEROSCOPY WITH NOVASURE ENDOMETRIAL ABLATION;  Surgeon: Mukund Castro MD;  Location: [facility];  Service: Obstetrics/Gynecology    DILATATION AND CURETTAGE      MAB    TONSILLECTOMY AND ADENOIDECTOMY      TUBAL ABDOMINAL LIGATION Bilateral        No Known Allergies    Family History   Problem Relation Age of Onset    Emphysema Father     COPD Father     Heart disease Mother     Hypertension Mother     Diabetes Mother     Coronary artery disease Mother     Arrhythmia Maternal Grandmother     Heart disease Paternal Uncle     Malig Hyperthermia Neg Hx     Breast cancer Neg Hx     Ovarian cancer Neg Hx     Uterine cancer Neg Hx     Colon cancer Neg Hx         Social History     Tobacco Use    Smoking status: Former     Current packs/day: 0.00     Average packs/day: 1.5 packs/day for 19.4 years (29.1 ttl pk-yrs)     Types: Cigarettes     Start date: 8/3/1989     Quit date: 2009     Years since quittin.2     Passive exposure: Never    Smokeless tobacco: Never   Vaping Use    Vaping status: Never Used   Substance Use Topics    Alcohol use: Not Currently     Comment: once per  "week or less    Drug use: Never       Objective     Vital Signs:   /68 (BP Location: Left arm, Patient Position: Sitting, Cuff Size: Adult)   Pulse 77   Ht 162.6 cm (64\")   Wt 81.9 kg (180 lb 9.6 oz)   BMI 31.00 kg/m²       Physical Exam  Constitutional:       General: The patient is not in acute distress.     Appearance: Normal appearance.   HENT:      Head: Normocephalic and atraumatic.      Nose: Nose normal.   Pulmonary:      Effort: Pulmonary effort is normal. No respiratory distress.   Abdominal:      General: Abdomen is flat.      Palpations: Abdomen is soft. There is no mass.      Tenderness: There is no abdominal tenderness. There is no guarding.   Musculoskeletal:      Cervical back: Neck supple.      Right lower leg: No edema.      Left lower leg: No edema.   Skin:     General: Skin is warm and dry.   Neurological:      General: No focal deficit present.      Mental Status: The patient is alert and oriented to person, place, and time.      Gait: Gait normal.   Psychiatric:         Mood and Affect: Mood normal.         Speech: Speech normal.         Behavior: Behavior normal.         Thought Content: Thought content normal.     Result Review :   The following data was reviewed by: PEBBLES Castaneda on 03/11/2025:    CBC w/diff          1/15/2025    08:51 3/4/2025    10:23   CBC w/Diff   WBC 5.61  9.68    RBC 4.88  4.77    Hemoglobin 14.4  13.8    Hematocrit 42.5  41.2    MCV 87.1  86.4    MCH 29.5  28.9    MCHC 33.9  33.5    RDW 11.8  11.8    Platelets 194  345    Neutrophil Rel % 61.9  68.0    Immature Granulocyte Rel % 0.4  0.8    Lymphocyte Rel % 27.1  22.7    Monocyte Rel % 6.8  7.9    Eosinophil Rel % 3.4  0.4    Basophil Rel % 0.4  0.2      CMP          1/15/2025    08:51 3/4/2025    10:23   CMP   Glucose 113  104    BUN 8  6    Creatinine 0.93  0.83    EGFR 74.6  85.5    Sodium 140  141    Potassium 4.2  4.6    Chloride 105  102    Calcium 10.2  10.1    Total Protein 7.4  7.4  "   Albumin 4.1  4.1    Globulin 3.3  3.3    Total Bilirubin 0.3  <0.2    Alkaline Phosphatase 83  116    AST (SGOT) 17  20    ALT (SGPT) 15  17    Albumin/Globulin Ratio 1.2  1.2    BUN/Creatinine Ratio 8.6  7.2    Anion Gap 8.7  11.0                    Assessment and Plan    Diagnoses and all orders for this visit:    1. Chronic idiopathic constipation (Primary)    2. Diverticulitis    3. Blood in stool    Other orders  -     docusate sodium (Colace) 100 MG capsule; Take 1 capsule by mouth 2 (Two) Times a Day.  Dispense: 60 capsule; Refill: 3  -     polyethylene glycol (GoLYTELY) 236 g solution; Take per office instructions  Dispense: 4000 mL; Refill: 0              Follow Up   Return if symptoms worsen or fail to improve.  Patient has been treated for diverticulitis x 2 since January, currently on Cipro and Flagyl and starting to improve.  Colace 100 BID if needed for constipation.   In 8 weeks, will check colonoscopy.   Colonoscopy Surgical Risk and Benefits: Possible risks/complications, benefits, and alternatives to surgical or invasive procedure have been explained to patient and/or legal guardian; risks include bleeding, infection, and perforation. Patient has been evaluated and can tolerate anesthesia and/or sedation. Risks, benefits, and alternatives to anesthesia and sedation have been explained to patient and/or legal guardian.   Pt may d/c Omeprazole and Carafate  - see how sx w/o it, pt will call if she sees any black stool. Denying any type of upper abd pain or HB.   Advised pt to stop NSAIDs  (Advil, Aleve, Ibuprofen, Motrin, Naproxen).   May take Tylenol, max dose is 2000 mg/d.   We discussed with diverticulosis the importance of daily fiber, but do not begin until all abdominal symptoms are resolved.    Patient was given instructions and counseling regarding her condition or for health maintenance advice. Please see specific information pulled into the AVS if appropriate.

## 2025-03-28 ENCOUNTER — PATIENT MESSAGE (OUTPATIENT)
Dept: GASTROENTEROLOGY | Facility: CLINIC | Age: 50
End: 2025-03-28
Payer: COMMERCIAL

## 2025-03-28 DIAGNOSIS — K92.1 BLOOD IN STOOL: Primary | ICD-10-CM

## 2025-04-01 ENCOUNTER — LAB (OUTPATIENT)
Facility: HOSPITAL | Age: 50
End: 2025-04-01
Payer: COMMERCIAL

## 2025-04-01 DIAGNOSIS — K92.1 BLOOD IN STOOL: ICD-10-CM

## 2025-04-01 LAB
DEPRECATED RDW RBC AUTO: 38.1 FL (ref 37–54)
ERYTHROCYTE [DISTWIDTH] IN BLOOD BY AUTOMATED COUNT: 12 % (ref 12.3–15.4)
HCT VFR BLD AUTO: 36.8 % (ref 34–46.6)
HGB BLD-MCNC: 12.4 G/DL (ref 12–15.9)
MCH RBC QN AUTO: 29.3 PG (ref 26.6–33)
MCHC RBC AUTO-ENTMCNC: 33.7 G/DL (ref 31.5–35.7)
MCV RBC AUTO: 87 FL (ref 79–97)
PLATELET # BLD AUTO: 206 10*3/MM3 (ref 140–450)
PMV BLD AUTO: 12.6 FL (ref 6–12)
RBC # BLD AUTO: 4.23 10*6/MM3 (ref 3.77–5.28)
WBC NRBC COR # BLD AUTO: 6.69 10*3/MM3 (ref 3.4–10.8)

## 2025-04-01 PROCEDURE — 36415 COLL VENOUS BLD VENIPUNCTURE: CPT

## 2025-04-01 PROCEDURE — 85027 COMPLETE CBC AUTOMATED: CPT

## 2025-04-01 PROCEDURE — 80053 COMPREHEN METABOLIC PANEL: CPT

## 2025-04-02 LAB
ALBUMIN SERPL-MCNC: 4.1 G/DL (ref 3.5–5.2)
ALBUMIN/GLOB SERPL: 1.5 G/DL
ALP SERPL-CCNC: 88 U/L (ref 39–117)
ALT SERPL W P-5'-P-CCNC: 11 U/L (ref 1–33)
ANION GAP SERPL CALCULATED.3IONS-SCNC: 11.4 MMOL/L (ref 5–15)
AST SERPL-CCNC: 17 U/L (ref 1–32)
BILIRUB SERPL-MCNC: <0.2 MG/DL (ref 0–1.2)
BUN SERPL-MCNC: 13 MG/DL (ref 6–20)
BUN/CREAT SERPL: 14.4 (ref 7–25)
CALCIUM SPEC-SCNC: 9.7 MG/DL (ref 8.6–10.5)
CHLORIDE SERPL-SCNC: 104 MMOL/L (ref 98–107)
CO2 SERPL-SCNC: 23.6 MMOL/L (ref 22–29)
CREAT SERPL-MCNC: 0.9 MG/DL (ref 0.57–1)
EGFRCR SERPLBLD CKD-EPI 2021: 77.6 ML/MIN/1.73
GLOBULIN UR ELPH-MCNC: 2.8 GM/DL
GLUCOSE SERPL-MCNC: 104 MG/DL (ref 65–99)
POTASSIUM SERPL-SCNC: 3.9 MMOL/L (ref 3.5–5.2)
PROT SERPL-MCNC: 6.9 G/DL (ref 6–8.5)
SODIUM SERPL-SCNC: 139 MMOL/L (ref 136–145)

## 2025-04-09 ENCOUNTER — OFFICE VISIT (OUTPATIENT)
Dept: FAMILY MEDICINE CLINIC | Facility: CLINIC | Age: 50
End: 2025-04-09
Payer: COMMERCIAL

## 2025-04-09 VITALS
TEMPERATURE: 97.9 F | HEIGHT: 64 IN | DIASTOLIC BLOOD PRESSURE: 72 MMHG | HEART RATE: 82 BPM | WEIGHT: 175.4 LBS | OXYGEN SATURATION: 100 % | BODY MASS INDEX: 29.94 KG/M2 | SYSTOLIC BLOOD PRESSURE: 112 MMHG

## 2025-04-09 DIAGNOSIS — M65.341 TRIGGER RING FINGER OF RIGHT HAND: ICD-10-CM

## 2025-04-09 DIAGNOSIS — K57.32 SIGMOID DIVERTICULITIS: ICD-10-CM

## 2025-04-09 DIAGNOSIS — S92.124S: ICD-10-CM

## 2025-04-09 DIAGNOSIS — M25.511 RIGHT SHOULDER PAIN, UNSPECIFIED CHRONICITY: ICD-10-CM

## 2025-04-09 DIAGNOSIS — M25.571 CHRONIC PAIN OF RIGHT ANKLE: Primary | ICD-10-CM

## 2025-04-09 DIAGNOSIS — S92.001S: ICD-10-CM

## 2025-04-09 DIAGNOSIS — G89.29 CHRONIC PAIN OF RIGHT KNEE: ICD-10-CM

## 2025-04-09 DIAGNOSIS — K92.1 BLOOD IN STOOL: ICD-10-CM

## 2025-04-09 DIAGNOSIS — Z51.81 MEDICATION MONITORING ENCOUNTER: ICD-10-CM

## 2025-04-09 DIAGNOSIS — R19.7 DIARRHEA, UNSPECIFIED TYPE: ICD-10-CM

## 2025-04-09 DIAGNOSIS — G89.29 CHRONIC PAIN OF RIGHT ANKLE: Primary | ICD-10-CM

## 2025-04-09 DIAGNOSIS — M25.561 CHRONIC PAIN OF RIGHT KNEE: ICD-10-CM

## 2025-04-09 RX ORDER — SULFAMETHOXAZOLE AND TRIMETHOPRIM 800; 160 MG/1; MG/1
1 TABLET ORAL 2 TIMES DAILY
Qty: 28 TABLET | Refills: 0 | Status: SHIPPED | OUTPATIENT
Start: 2025-04-09 | End: 2025-04-24

## 2025-04-09 RX ORDER — METRONIDAZOLE 500 MG/1
500 TABLET ORAL 3 TIMES DAILY
Qty: 42 TABLET | Refills: 0 | Status: SHIPPED | OUTPATIENT
Start: 2025-04-09 | End: 2025-04-24

## 2025-04-09 NOTE — PROGRESS NOTES
Chief Complaint  Follow-up, Ankle Pain (right), Diverticulitis, and Hand Pain (Right middle )    Subjective          Maria Ines Garcia presents to [facility]    History of Present Illness     History of Present Illness  The patient is a 49-year-old female who presents for follow-up of right ankle pain, diverticulitis, trigger finger, and right shoulder pain.    She reports chronic right ankle pain following a fall from a ladder in October. An MRI conducted in November revealed a fracture of the talus and calcaneus. She has been under the care of Dr. Ann, a podiatrist, since December and has transitioned out of the boot. Despite this, she continues to experience a limp and tenderness in the ankle, although without constant pain. The pain is localized to the outer aspect of the ankle and is exacerbated by standing on concrete surfaces, a requirement of her job. She has attempted to alleviate the discomfort by changing her footwear four times. The pain is particularly noticeable when she exits her vehicle after driving, but it subsides upon movement.    She has been diagnosed with diverticulitis and has had two episodes since January. She was previously treated with Cipro and Flagyl, which resulted in some improvement. However, she is uncertain if she is experiencing another flare-up or if there is an alternative cause for her symptoms. She has not yet completed a stool sample test. She reports occasional bloody mucus discharge during bowel movements, but no solid stool. She is currently taking Colace twice daily and Augmentin. She reports feeling better than during her initial visit but experienced severe abdominal pain following a large bowel movement last week. This pain was accompanied by back pain. She has no history of acid reflux. She has been making dietary modifications, including increasing her fiber intake and consuming yogurt. She is scheduled for a colonoscopy in May and  "is concerned about the potential impact of antibiotics on this procedure. She has been advised to repeat her blood work in two weeks due to concerns about bleeding. She reports no current dark stools.    She has been experiencing locking of her right ring finger at night for the past one to two months, which she attributes to arthritis. This condition causes pain and difficulty in making a fist.    She also reports right shoulder pain, which is more pronounced at night. The pain originates from the front of the shoulder and radiates downwards.    MEDICATIONS  Current: Colace, Augmentin  Past: Cipro, Flagyl         Current Outpatient Medications   Medication Instructions    Aspirin Low Dose 81 mg, Oral, Daily    bisoprolol (ZEBeta) 5 MG tablet TAKE 1 TABLET BY MOUTH DAILY    buPROPion XL (WELLBUTRIN XL) 150 MG 24 hr tablet TAKE 1 TABLET BY MOUTH EVERY MORNING    docusate sodium (COLACE) 100 mg, Oral, 2 Times Daily    Fexofenadine HCl (ALLERGY 24-HR PO) 1 tablet, Every Morning    metroNIDAZOLE (FLAGYL) 500 mg, Oral, 3 Times Daily    omeprazole (PRILOSEC) 40 mg, Oral, 2 Times Daily    polyethylene glycol (GoLYTELY) 236 g solution Take per office instructions    rosuvastatin (CRESTOR) 5 mg, Oral, Daily    sucralfate (CARAFATE) 1 g, Oral, 4 Times Daily    sulfamethoxazole-trimethoprim (Bactrim DS) 800-160 MG per tablet 1 tablet, Oral, 2 Times Daily    vitamin B-12 (CYANOCOBALAMIN) 1,000 mcg, Oral, Daily    vitamin D (ERGOCALCIFEROL) 50,000 Units, Oral, Weekly       The following portions of the patient's history were reviewed and updated as appropriate: allergies, current medications, past family history, past medical history, past social history, past surgical history, and problem list.    Objective   Vital Signs:   /72   Pulse 82   Temp 97.9 °F (36.6 °C) (Oral)   Ht 162.6 cm (64\")   Wt 79.6 kg (175 lb 6.4 oz)   SpO2 100%   BMI 30.11 kg/m²     BP Readings from Last 3 Encounters:   04/09/25 112/72   03/11/25 " 105/68   03/04/25 122/80     Wt Readings from Last 3 Encounters:   04/09/25 79.6 kg (175 lb 6.4 oz)   03/11/25 81.9 kg (180 lb 9.6 oz)   03/04/25 79.4 kg (175 lb 1.6 oz)           Physical Exam  Vitals reviewed.   Constitutional:       Appearance: Normal appearance.   HENT:      Head: Normocephalic and atraumatic.      Right Ear: External ear normal.      Left Ear: External ear normal.      Nose: Nose normal.   Eyes:      Conjunctiva/sclera: Conjunctivae normal.   Cardiovascular:      Rate and Rhythm: Normal rate and regular rhythm.      Heart sounds: No murmur heard.     No friction rub. No gallop.   Pulmonary:      Effort: Pulmonary effort is normal.      Breath sounds: Normal breath sounds. No wheezing or rhonchi.   Abdominal:      General: Bowel sounds are normal. There is no distension.      Palpations: Abdomen is soft.      Tenderness: There is abdominal tenderness.      Comments: Patient has tenderness to palpation left lower quadrant region.  This is mild compared to her previous exam.   Musculoskeletal:      Comments: The right ankle demonstrates some pain with plantarflexion..  There is some swelling on the lateral aspect noted as well.  No deformity appreciated.  The right shoulder demonstrates positive empty can test with positive Vincent liftoff testing and positive external rotation testing.    Reproducible trigger finger noted involving the fourth digit of the right hand.   Skin:     General: Skin is warm and dry.   Neurological:      Mental Status: She is alert and oriented to person, place, and time.      Cranial Nerves: No cranial nerve deficit.   Psychiatric:         Mood and Affect: Mood and affect normal.         Behavior: Behavior normal.         Thought Content: Thought content normal.         Judgment: Judgment normal.            Result Review :   The following data was reviewed by: Lennox Tran DO on 04/09/2025:  Common labs          1/15/2025    08:51 3/4/2025    10:23 4/1/2025    15:23    Common Labs   Glucose 113  104  104    BUN 8  6  13    Creatinine 0.93  0.83  0.90    Sodium 140  141  139    Potassium 4.2  4.6  3.9    Chloride 105  102  104    Calcium 10.2  10.1  9.7    Albumin 4.1  4.1  4.1    Total Bilirubin 0.3  <0.2  <0.2    Alkaline Phosphatase 83  116  88    AST (SGOT) 17  20  17    ALT (SGPT) 15  17  11    WBC 5.61  9.68  6.69    Hemoglobin 14.4  13.8  12.4    Hematocrit 42.5  41.2  36.8    Platelets 194  345  206    Total Cholesterol 211      Triglycerides 225      HDL Cholesterol 47      LDL Cholesterol  124      Hemoglobin A1C 6.10               Lab Results   Component Value Date    SARSANTIGEN Not Detected 02/26/2025    FLUAAG Not Detected 02/26/2025    FLUBAG Not Detected 02/26/2025    RAPSCRN Negative 03/02/2025    POCPREGUR Negative 11/29/2019    BILIRUBINUR Negative 11/21/2024       Results  Laboratory Studies  Hemoglobin levels were normal. C-reactive protein was elevated. Lipase level was normal.    Imaging  MRI of the right ankle showed a fracture of the talus and the calcaneus, prominent osseous contusions involving the talus and the calcaneus, moderate osteoarthritic changes, sprain of the anterior talofibular and calcaneofibular ligament, and moderate tibiotalar joint effusion.  Right knee imaging showed areas of moderate grade chondral loss and chondral irregularity in the patellofemoral compartment.    Procedures        Assessment and Plan    Diagnoses and all orders for this visit:    1. Chronic pain of right ankle (Primary)    2. Closed nondisplaced fracture of body of right talus, sequela    3. Closed traumatic nondisplaced fracture of right calcaneus, sequela    4. Chronic pain of right knee    5. Medication monitoring encounter    6. Sigmoid diverticulitis  -     C-reactive protein; Future    7. Blood in stool    8. Trigger ring finger of right hand    9. Right shoulder pain, unspecified chronicity    10. Diarrhea, unspecified type    Other orders  -      sulfamethoxazole-trimethoprim (Bactrim DS) 800-160 MG per tablet; Take 1 tablet by mouth 2 (Two) Times a Day for 14 days.  Dispense: 28 tablet; Refill: 0  -     metroNIDAZOLE (Flagyl) 500 MG tablet; Take 1 tablet by mouth 3 (Three) Times a Day for 14 days.  Dispense: 42 tablet; Refill: 0        Assessment & Plan  1. Chronic right ankle pain.  The patient has chronic right ankle pain following a fall from a ladder in October. An MRI in November showed a fracture of the talus and calcaneus, prominent osseous contusions, moderate osteoarthritic changes, sprain of the anterior talofibular and calcaneofibular ligaments, and moderate tibiotalar joint effusion. She was previously seen by Dr. Ann in podiatry and was placed in a boot. She missed a follow-up appointment in January. She is advised to schedule a follow-up appointment with Dr. Ann to assess the need for further intervention.    2. Diverticulitis.  The patient has ongoing issues with diverticulitis, experiencing symptoms such as bloody mucus during bowel movements and abdominal pain.  While her symptoms do not appear as severe as it did last time it certainly a consideration given the previous issues she has had.  She has been treated with antibiotics twice since January (Cipro and Flagyl). A third round of antibiotics, Bactrim and Flagyl, will be prescribed for two weeks. A repeat CRP test will be ordered to monitor inflammation levels. She is advised to complete stool studies if symptoms persist after the antibiotic course. If recurrent flare-ups occur, a surgical consultation may be necessary.  We discussed consideration for repeat imaging as well in the future if needed.    3. Trigger finger.  The patient has been experiencing locking and pain in the right ring finger for the past month or two, consistent with trigger finger. She is advised to use an over-the-counter splint initially. If symptoms persist, a steroid injection can be  considered.    4. Right shoulder pain.  The patient reports pain in the right shoulder, especially at night, which radiates down the arm. An x-ray will be considered if symptoms worsen.    Follow-up  The patient will follow up in 6 weeks.       There are no discontinued medications.       Follow Up   Return in about 6 weeks (around 5/21/2025) for diverticulitis.  Patient was given instructions and counseling regarding her condition or for health maintenance advice. Please see specific information pulled into the AVS if appropriate.     Patient or patient representative verbalized consent for the use of Ambient Listening during the visit with  Lennox Tran DO for chart documentation. 4/9/2025  16:05 EDT    Lennox Tran DO  04/09/25  16:27 EDT

## 2025-04-21 ENCOUNTER — LAB (OUTPATIENT)
Facility: HOSPITAL | Age: 50
End: 2025-04-21
Payer: COMMERCIAL

## 2025-04-21 DIAGNOSIS — K57.32 SIGMOID DIVERTICULITIS: ICD-10-CM

## 2025-04-21 DIAGNOSIS — K92.1 BLOOD IN STOOL: ICD-10-CM

## 2025-04-21 LAB
CRP SERPL-MCNC: 2.21 MG/DL (ref 0–0.5)
HCT VFR BLD AUTO: 39.2 % (ref 34–46.6)
HGB BLD-MCNC: 12.9 G/DL (ref 12–15.9)

## 2025-04-21 PROCEDURE — 36415 COLL VENOUS BLD VENIPUNCTURE: CPT

## 2025-04-21 PROCEDURE — 86140 C-REACTIVE PROTEIN: CPT

## 2025-04-21 PROCEDURE — 85018 HEMOGLOBIN: CPT

## 2025-04-21 PROCEDURE — 85014 HEMATOCRIT: CPT

## 2025-04-26 PROCEDURE — 87086 URINE CULTURE/COLONY COUNT: CPT | Performed by: FAMILY MEDICINE

## 2025-05-02 ENCOUNTER — PATIENT ROUNDING (BHMG ONLY) (OUTPATIENT)
Dept: URGENT CARE | Facility: CLINIC | Age: 50
End: 2025-05-02
Payer: COMMERCIAL

## 2025-05-02 NOTE — ED NOTES
Thank you for letting us care for you in your recent visit to our urgent care center. We would love to hear about your experience with us. Was this the first time you have visited our location?    We're always looking for ways to make our patients' experiences even better. Do you have any recommendations on ways we may improve?     I appreciate you taking the time to respond. Please be on the lookout for a survey about your recent visit from [employer] via text or email. We would greatly appreciate if you could fill that out and turn it back in. We want your voice to be heard and we value your feedback.   Thank you for choosing [facility] for your healthcare needs.

## 2025-05-06 ENCOUNTER — TELEPHONE (OUTPATIENT)
Dept: GASTROENTEROLOGY | Facility: CLINIC | Age: 50
End: 2025-05-06
Payer: COMMERCIAL

## 2025-05-06 ENCOUNTER — ANESTHESIA EVENT (OUTPATIENT)
Dept: GASTROENTEROLOGY | Facility: HOSPITAL | Age: 50
End: 2025-05-06
Payer: COMMERCIAL

## 2025-05-06 NOTE — ANESTHESIA PREPROCEDURE EVALUATION
Anesthesia Evaluation     Patient summary reviewed and Nursing notes reviewed   NPO Solid Status: > 8 hours  NPO Liquid Status: > 2 hours           Airway   Mallampati: III  TM distance: >3 FB  Neck ROM: full  Possible difficult intubation and Small opening  Dental    (+) upper dentures    Pulmonary - normal exam    breath sounds clear to auscultation  Cardiovascular - normal exam    ECG reviewed  Rhythm: regular  Rate: normal    (+) hypertension, hyperlipidemia      Neuro/Psych  (+) psychiatric history Anxiety  GI/Hepatic/Renal/Endo    (+) GI bleeding resolved, hepatitis B, liver disease    Musculoskeletal     Abdominal  - normal exam    Abdomen: soft.  Bowel sounds: normal.   Substance History      OB/GYN          Other        ROS/Med Hx Other:     EKG 9/18/2024  SR    ECHO 3/20/2018  -Normal global strain -21.9  -Left Ventricle: Calculated EF = 65.5%  -There is no evidence of pericardial effusion.  -Trace-to-mild mitral valve regurgitation is present    Stress Test 4/21/2022  -Arrhythmias were not significant during stress.  -No ECG evidence of myocardial ischemia. Negative clinical evidence of myocardial ischemia. Findings consistent with a normal ECG stress test   -Asymptomatic for chest pain. ECG is negative for ischemia.   -Ectopy: none  -B/P is appropriate.   -Exercise tolerance is good.       HCG: N/A - S/P BTL                      Anesthesia Plan    ASA 3     general   total IV anesthesia  (Total IV Anesthesia    Patient understands anesthesia not responsible for dental damage.    Discussed risks with pt including aspiration, allergic reactions, apnea, advanced airway placement. Pt verbalized understanding. All questions answered.     )  intravenous induction     Anesthetic plan, risks, benefits, and alternatives have been provided, discussed and informed consent has been obtained with: patient and mother.  Pre-procedure education provided  Plan discussed with CRNA.        CODE STATUS:

## 2025-05-06 NOTE — TELEPHONE ENCOUNTER
ENDO RECONCILIATION  Verify source of procedure(s): Nurse/MA screening with Martha GASPAR MA on 1.16.25    Referral Rec'd      TIME OUT-CONFIRM CORRECT PROCEDURE: Colonoscopy        Cardiology: No  Pulmonology: No  Blood thinner: No  GLP-1: No  Additional DX/indication for procedure: N/A  Please include any other notes relevant to endo reconciliation: N/A

## 2025-05-07 ENCOUNTER — HOSPITAL ENCOUNTER (OUTPATIENT)
Facility: HOSPITAL | Age: 50
Setting detail: HOSPITAL OUTPATIENT SURGERY
Discharge: HOME | End: 2025-05-07
Attending: INTERNAL MEDICINE | Admitting: INTERNAL MEDICINE
Payer: COMMERCIAL

## 2025-05-07 ENCOUNTER — ANESTHESIA (OUTPATIENT)
Dept: GASTROENTEROLOGY | Facility: HOSPITAL | Age: 50
End: 2025-05-07
Payer: COMMERCIAL

## 2025-05-07 VITALS
OXYGEN SATURATION: 97 % | WEIGHT: 167.77 LBS | HEART RATE: 82 BPM | RESPIRATION RATE: 15 BRPM | DIASTOLIC BLOOD PRESSURE: 80 MMHG | BODY MASS INDEX: 28.8 KG/M2 | TEMPERATURE: 98.4 F | SYSTOLIC BLOOD PRESSURE: 112 MMHG

## 2025-05-07 DIAGNOSIS — Z12.11 ENCOUNTER FOR SCREENING FOR MALIGNANT NEOPLASM OF COLON: ICD-10-CM

## 2025-05-07 PROCEDURE — 25010000002 GLUCAGON (RDNA) PER 1 MG: Performed by: NURSE ANESTHETIST, CERTIFIED REGISTERED

## 2025-05-07 PROCEDURE — 88305 TISSUE EXAM BY PATHOLOGIST: CPT | Performed by: INTERNAL MEDICINE

## 2025-05-07 PROCEDURE — 25010000002 LIDOCAINE PF 2% 2 % SOLUTION: Performed by: NURSE ANESTHETIST, CERTIFIED REGISTERED

## 2025-05-07 PROCEDURE — 25010000002 PROPOFOL 10 MG/ML EMULSION: Performed by: NURSE ANESTHETIST, CERTIFIED REGISTERED

## 2025-05-07 PROCEDURE — 25810000003 LACTATED RINGERS PER 1000 ML: Performed by: NURSE ANESTHETIST, CERTIFIED REGISTERED

## 2025-05-07 RX ORDER — LIDOCAINE HYDROCHLORIDE 20 MG/ML
INJECTION, SOLUTION EPIDURAL; INFILTRATION; INTRACAUDAL; PERINEURAL AS NEEDED
Status: DISCONTINUED | OUTPATIENT
Start: 2025-05-07 | End: 2025-05-07 | Stop reason: SURG

## 2025-05-07 RX ORDER — PROPOFOL 10 MG/ML
VIAL (ML) INTRAVENOUS AS NEEDED
Status: DISCONTINUED | OUTPATIENT
Start: 2025-05-07 | End: 2025-05-07 | Stop reason: SURG

## 2025-05-07 RX ORDER — SODIUM CHLORIDE, SODIUM LACTATE, POTASSIUM CHLORIDE, CALCIUM CHLORIDE 600; 310; 30; 20 MG/100ML; MG/100ML; MG/100ML; MG/100ML
30 INJECTION, SOLUTION INTRAVENOUS CONTINUOUS
Status: DISCONTINUED | OUTPATIENT
Start: 2025-05-07 | End: 2025-05-07 | Stop reason: HOSPADM

## 2025-05-07 RX ORDER — IBUPROFEN 600 MG/1
TABLET ORAL AS NEEDED
Status: DISCONTINUED | OUTPATIENT
Start: 2025-05-07 | End: 2025-05-07 | Stop reason: SURG

## 2025-05-07 RX ADMIN — GLUCAGON 0.5 MG: KIT at 10:20

## 2025-05-07 RX ADMIN — LIDOCAINE HYDROCHLORIDE 80 MG: 20 INJECTION, SOLUTION INTRAVENOUS at 10:09

## 2025-05-07 RX ADMIN — PROPOFOL 100 MG: 10 INJECTION, EMULSION INTRAVENOUS at 10:25

## 2025-05-07 RX ADMIN — PROPOFOL 100 MG: 10 INJECTION, EMULSION INTRAVENOUS at 10:09

## 2025-05-07 RX ADMIN — PROPOFOL 225 MCG/KG/MIN: 10 INJECTION, EMULSION INTRAVENOUS at 10:11

## 2025-05-07 RX ADMIN — SODIUM CHLORIDE, POTASSIUM CHLORIDE, SODIUM LACTATE AND CALCIUM CHLORIDE: 600; 310; 30; 20 INJECTION, SOLUTION INTRAVENOUS at 10:07

## 2025-05-07 NOTE — H&P
Pre Procedure History & Physical    Chief Complaint:   Screening    Subjective     HPI:   Colon cancer screening    Past Medical History:   Past Medical History:   Diagnosis Date    Abnormal Pap smear of cervix 2024    LGSIL    Allergic     Anxiety     COVID-19 2023    Diverticulitis of colon     Diverticulosis     GI (gastrointestinal bleed)     Heavy periods     History of hepatitis B     Hyperlipidemia     Hypertension     Infectious viral hepatitis     Hep B    Rosacea     Seasonal allergies     Uterine fibroid     Varicella        Past Surgical History:  Past Surgical History:   Procedure Laterality Date     SECTION      COLPOSCOPY  2024    CRANIOPLASTY FOR CRANIAL DEFECT      performed to design plates, 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,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    D & C HYSTEROSCOPY N/A 2019    Procedure: AND MYOSURE;  Surgeon: Mukund Castro MD;  Location: [facility];  Service: Obstetrics/Gynecology    D & C HYSTEROSCOPY ENDOMETRIAL ABLATION N/A 2019    Procedure: DILATATION AND CURETTAGE HYSTEROSCOPY WITH NOVASURE ENDOMETRIAL ABLATION;  Surgeon: Mukund Castro MD;  Location: [facility];  Service: Obstetrics/Gynecology    DILATATION AND CURETTAGE      MAB    TONSILLECTOMY AND ADENOIDECTOMY      TUBAL ABDOMINAL LIGATION Bilateral        Family History:  Family History   Problem Relation Age of Onset    Emphysema Father     COPD Father     Heart disease Mother     Hypertension Mother     Diabetes Mother     Coronary artery disease Mother     Arrhythmia Maternal Grandmother     Heart disease Paternal Uncle     Malig Hyperthermia Neg Hx     Breast cancer Neg Hx     Ovarian cancer Neg Hx     Uterine cancer Neg Hx     Colon cancer Neg Hx        Social History:   reports that she quit smoking about 16 years ago. Her smoking use included cigarettes. She started smoking about 35 years ago. She has a 29.1  pack-year smoking history. She has never been exposed to tobacco smoke. She has never used smokeless tobacco. She reports that she does not currently use alcohol. She reports that she does not use drugs.    Medications:   Medications Prior to Admission   Medication Sig Dispense Refill Last Dose/Taking    bisoprolol (ZEBeta) 5 MG tablet Take 1 tablet by mouth Daily. 90 tablet 3 5/6/2025    buPROPion XL (WELLBUTRIN XL) 150 MG 24 hr tablet TAKE 1 TABLET BY MOUTH EVERY MORNING 90 tablet 3 5/6/2025    Fexofenadine HCl (ALLERGY 24-HR PO) Take 1 tablet by mouth Every Morning.   5/6/2025    rosuvastatin (CRESTOR) 5 MG tablet Take 1 tablet by mouth Daily. 90 tablet 3 5/6/2025    vitamin B-12 (CYANOCOBALAMIN) 1000 MCG tablet Take 1 tablet by mouth Daily. 90 tablet 1 5/6/2025    vitamin D (ERGOCALCIFEROL) 1.25 MG (71606 UT) capsule capsule Take 1 capsule by mouth 1 (One) Time Per Week. 13 capsule 3 5/6/2025    docusate sodium (Colace) 100 MG capsule Take 1 capsule by mouth 2 (Two) Times a Day. 60 capsule 3     phenazopyridine (PYRIDIUM) 200 MG tablet Take 1 tablet by mouth 3 (Three) Times a Day As Needed for Bladder Spasms. 6 tablet 0        Allergies:  Patient has no known allergies.        Objective     Blood pressure 113/63, pulse 89, temperature 98.3 °F (36.8 °C), temperature source Temporal, resp. rate 16, weight 76.1 kg (167 lb 12.3 oz), SpO2 100%, not currently breastfeeding.    Physical Exam   Constitutional: Pt is oriented to person, place, and time and well-developed, well-nourished, and in no distress.   Mouth/Throat: Oropharynx is clear and moist.   Neck: Normal range of motion.   Cardiovascular: Normal rate, regular rhythm and normal heart sounds.    Pulmonary/Chest: Effort normal and breath sounds normal.   Abdominal: Soft. Nontender  Skin: Skin is warm and dry.   Psychiatric: Mood, memory, affect and judgment normal.     Assessment & Plan     Diagnosis:  Colon cancer screening    Anticipated Surgical  Procedure:  Colonoscopy    The risks, benefits, and alternatives of this procedure have been discussed with the patient or the responsible party- the patient understands and agrees to proceed.

## 2025-05-07 NOTE — ANESTHESIA POSTPROCEDURE EVALUATION
Patient: Maria Ines Garcia    Procedure Summary       Date: 05/07/25 Room / Location: [facility] ENDOSCOPY 3 / [facility] ENDOSCOPY    Anesthesia Start: 1007 Anesthesia Stop: 1040    Procedure: COLONOSCOPY with cold snare polypectomy, biopsies Diagnosis:       Encounter for screening for malignant neoplasm of colon      (Encounter for screening for malignant neoplasm of colon [Z12.11])    Surgeons: Jose Rosa MD Provider: Rhea Mitchell CRNA    Anesthesia Type: general ASA Status: 3            Anesthesia Type: general    Vitals  Vitals Value Taken Time   /80 05/07/25 10:53   Temp 36.9 °C (98.4 °F) 05/07/25 10:53   Pulse 82 05/07/25 10:53   Resp 15 05/07/25 10:53   SpO2 97 % 05/07/25 10:53           Post Anesthesia Care and Evaluation    Patient location during evaluation: bedside  Patient participation: complete - patient participated  Level of consciousness: awake  Pain management: adequate    Airway patency: patent  Anesthetic complications: No anesthetic complications  PONV Status: controlled  Cardiovascular status: acceptable and stable  Respiratory status: acceptable

## 2025-05-08 ENCOUNTER — RESULTS FOLLOW-UP (OUTPATIENT)
Dept: GASTROENTEROLOGY | Facility: HOSPITAL | Age: 50
End: 2025-05-08
Payer: COMMERCIAL

## 2025-05-08 LAB
CYTO UR: NORMAL
LAB AP CASE REPORT: NORMAL
LAB AP CLINICAL INFORMATION: NORMAL
PATH REPORT.FINAL DX SPEC: NORMAL
PATH REPORT.GROSS SPEC: NORMAL

## 2025-05-14 NOTE — TELEPHONE ENCOUNTER
Attempted to contact patient, left voicemail message to return call. Provided direct contact information.    Care Gap updated:  5 year colon recall placed

## 2025-05-16 NOTE — TELEPHONE ENCOUNTER
Patient called into the office. She is aware of results. Patient states she has a follow up with PCP next week and will discuss a referral to colorectal surgeon, Dr. Flowers. Patient states she has also started Metamucil gummies, recommended by Dr. Rosa and has seen improvement in constipation.

## 2025-05-22 ENCOUNTER — OFFICE VISIT (OUTPATIENT)
Dept: FAMILY MEDICINE CLINIC | Facility: CLINIC | Age: 50
End: 2025-05-22
Payer: COMMERCIAL

## 2025-05-22 VITALS
SYSTOLIC BLOOD PRESSURE: 112 MMHG | TEMPERATURE: 97.8 F | HEIGHT: 64 IN | OXYGEN SATURATION: 98 % | BODY MASS INDEX: 29.19 KG/M2 | HEART RATE: 75 BPM | DIASTOLIC BLOOD PRESSURE: 58 MMHG | WEIGHT: 171 LBS

## 2025-05-22 DIAGNOSIS — M65.341 TRIGGER RING FINGER OF RIGHT HAND: ICD-10-CM

## 2025-05-22 DIAGNOSIS — N89.8 VAGINAL DISCHARGE: ICD-10-CM

## 2025-05-22 DIAGNOSIS — K57.92 DIVERTICULITIS: Primary | ICD-10-CM

## 2025-05-22 DIAGNOSIS — M25.571 CHRONIC PAIN OF RIGHT ANKLE: ICD-10-CM

## 2025-05-22 DIAGNOSIS — G89.29 CHRONIC PAIN OF RIGHT ANKLE: ICD-10-CM

## 2025-05-22 LAB
BACTERIAL VAGINOSIS VAG-IMP: NEGATIVE
CANDIDA DNA VAG QL NAA+PROBE: DETECTED
CANDIDA DNA VAG QL NAA+PROBE: NOT DETECTED
T VAGINALIS DNA VAG QL NAA+PROBE: NOT DETECTED

## 2025-05-22 PROCEDURE — 99213 OFFICE O/P EST LOW 20 MIN: CPT | Performed by: FAMILY MEDICINE

## 2025-05-22 PROCEDURE — 81515 NFCT DS BV&VAGINITIS DNA ALG: CPT | Performed by: FAMILY MEDICINE

## 2025-05-22 RX ORDER — FLUCONAZOLE 150 MG/1
TABLET ORAL
Qty: 2 TABLET | Refills: 0 | Status: SHIPPED | OUTPATIENT
Start: 2025-05-22 | End: 2025-05-23

## 2025-05-22 NOTE — PROGRESS NOTES
Chief Complaint  Follow-up and Diverticulitis    Subjective          Maria Ines Garcia presents to [facility]    History of Present Illness     History of Present Illness  The patient is a 49-year-old female who presents today to follow up for diverticulitis.    She has been managing her diverticulitis with Metamucil, as recommended by Dr. Bruno's nurse, which she reports has significantly improved her condition. She has not experienced any recent episodes of diverticulitis. During her colonoscopy, she was informed that a severe flare-up could necessitate an emergency room visit and the use of a colostomy bag. Consequently, she is considering a consultation with a surgeon. Despite the inflammation observed during her colonoscopy, she believes her condition has stabilized. Since the onset of her symptoms earlier this year, she has completed four courses of antibiotics. She also discovered that aspirin was causing significant bleeding, leading her to discontinue its use. She has been supplementing her diet with fiber, taking two Metamucil gummies in the morning, consuming a Metamucil cookie bar, and eating an apple. She was previously prescribed Colace by Dr. Darline Boland but found it ineffective. She has discontinued the use of ibuprofen after learning about its potential impact on diverticulitis.    She has not yet scheduled an appointment with a podiatrist for her right ankle. She has been wearing Go shoes, which she believes may be causing discomfort due to inadequate stretching of the tendon at the bottom of her foot. She reports that her condition has improved but still wishes to consult a podiatrist. She has previously seen a podiatrist once but has not returned since.    She has been experiencing locking of her ring finger at night for the past 1 to 2 months. She was informed that a steroid injection might be considered as a treatment option. She reports that her arm  "and shoulder have been causing her significant pain. However, she notes that the triggering of her finger has not been as severe recently and she is unable to reproduce the locking.    She suspects she may have a vaginal yeast infection and requests testing. She reports experiencing mild vaginal discharge but no severe itching. She confirms that she has not had any new sexual partners.         Current Outpatient Medications   Medication Instructions    bisoprolol (ZEBETA) 5 mg, Oral, Daily    buPROPion XL (WELLBUTRIN XL) 150 MG 24 hr tablet TAKE 1 TABLET BY MOUTH EVERY MORNING    Fexofenadine HCl (ALLERGY 24-HR PO) 1 tablet, Every Morning    fluconazole (Diflucan) 150 MG tablet Take 1 tablet PO once, repeat dose in 3 days    phenazopyridine (PYRIDIUM) 200 mg, Oral, 3 Times Daily PRN    rosuvastatin (CRESTOR) 5 mg, Oral, Daily    Stool Softener 100 mg, Oral, 2 Times Daily    vitamin B-12 (CYANOCOBALAMIN) 1,000 mcg, Oral, Daily    vitamin D (ERGOCALCIFEROL) 50,000 Units, Oral, Weekly       The following portions of the patient's history were reviewed and updated as appropriate: allergies, current medications, past family history, past medical history, past social history, past surgical history, and problem list.    Objective   Vital Signs:   /58   Pulse 75   Temp 97.8 °F (36.6 °C) (Oral)   Ht 162.6 cm (64\")   Wt 77.6 kg (171 lb)   SpO2 98%   BMI 29.35 kg/m²     BP Readings from Last 3 Encounters:   05/22/25 112/58   05/07/25 112/80   04/26/25 123/73     Wt Readings from Last 3 Encounters:   05/22/25 77.6 kg (171 lb)   05/07/25 76.1 kg (167 lb 12.3 oz)   04/26/25 77.9 kg (171 lb 11.2 oz)           Physical Exam  Vitals reviewed.   Constitutional:       Appearance: Normal appearance.   HENT:      Head: Normocephalic and atraumatic.      Right Ear: External ear normal.      Left Ear: External ear normal.      Nose: Nose normal.   Eyes:      Conjunctiva/sclera: Conjunctivae normal.   Cardiovascular:      Rate " and Rhythm: Normal rate and regular rhythm.      Heart sounds: No murmur heard.     No friction rub. No gallop.   Pulmonary:      Effort: Pulmonary effort is normal.      Breath sounds: Normal breath sounds. No wheezing or rhonchi.   Abdominal:      General: Bowel sounds are normal. There is no distension.      Palpations: Abdomen is soft.      Tenderness: There is no abdominal tenderness.   Musculoskeletal:      Comments: No reproducible trigger finger noted today involving the fourth digit of the right hand.   Skin:     General: Skin is warm and dry.   Neurological:      Mental Status: She is alert and oriented to person, place, and time.      Cranial Nerves: No cranial nerve deficit.   Psychiatric:         Mood and Affect: Mood and affect normal.         Behavior: Behavior normal.         Thought Content: Thought content normal.         Judgment: Judgment normal.            Result Review :   The following data was reviewed by: Lennox Tran DO on 05/22/2025:  Common labs          3/4/2025    10:23 4/1/2025    15:23 4/21/2025    15:30   Common Labs   Glucose 104  104     BUN 6  13     Creatinine 0.83  0.90     Sodium 141  139     Potassium 4.6  3.9     Chloride 102  104     Calcium 10.1  9.7     Albumin 4.1  4.1     Total Bilirubin <0.2  <0.2     Alkaline Phosphatase 116  88     AST (SGOT) 20  17     ALT (SGPT) 17  11     WBC 9.68  6.69     Hemoglobin 13.8  12.4  12.9    Hematocrit 41.2  36.8  39.2    Platelets 345  206              Lab Results   Component Value Date    SARSANTIGEN Not Detected 02/26/2025    FLUAAG Not Detected 02/26/2025    FLUBAG Not Detected 02/26/2025    RAPSCRN Negative 03/02/2025    POCPREGUR Negative 11/29/2019    BILIRUBINUR Negative 04/26/2025       Results  Diagnostic Testing   - Colonoscopy: Mild to moderate, nonspecific acute and chronic inflammation in the sigmoid colon, no cancer. Small polyp in the ascending colon negative for cancer.    Procedures        Assessment and Plan     Diagnoses and all orders for this visit:    1. Diverticulitis (Primary)  -     Ambulatory Referral to General Surgery    2. Chronic pain of right ankle    3. Trigger ring finger of right hand    4. Vaginal discharge  -     [facility] Vaginosis Panel - Swab, Vagina    Other orders  -     fluconazole (Diflucan) 150 MG tablet; Take 1 tablet PO once, repeat dose in 3 days  Dispense: 2 tablet; Refill: 0        Assessment & Plan  1. Recurrent diverticulitis.  - Patient has received antibiotics on 3 separate occasions for recurrent diverticulitis.  She is now symptomatically improved and symptoms have resolved.  - Discussed the need for a surgical consultation due to recurrent episodes and potential complications.  - Referral to Dr. Flowers for recurrent diverticulitis will be initiated.  Patient would like to discuss her options    2. Right ankle pain.  - History of fractures of the talus and calcaneus, osseous contusions involving the talus and calcaneus, osteoarthritis, and sprains of the anterior talofibular and calcaneal fibular ligaments.  - Patient reports improvement with new footwear but still desires a podiatry consultation.  - Previous referral to podiatry was made; patient will schedule an appointment when ready.  - Continued monitoring of symptoms and management with appropriate footwear.    3. Trigger finger.  - Presented with a reproducible trigger finger during the last visit.  - Currently unable to reproduce the triggering, indicating improvement.  - Discussed that an injection is not necessary at this time due to the lack of symptoms.  - Will continue to monitor and reassess if symptoms recur.    4. Suspected vaginal yeast infection.  - Reports a little bit of discharge and mild itching, no new sexual partners.  - A vaginal swab will be provided for self-collection to confirm the diagnosis.  - Prescription for Diflucan 150 mg, to be taken orally as needed, will be sent to the pharmacy.    Follow-up  The patient  will follow up in 3 months.       There are no discontinued medications.       Follow Up   Return in about 3 months (around 8/22/2025) for diverticulitis.  Patient was given instructions and counseling regarding her condition or for health maintenance advice. Please see specific information pulled into the AVS if appropriate.     Patient or patient representative verbalized consent for the use of Ambient Listening during the visit with  Lennox Tran DO for chart documentation. 5/22/2025  16:52 EDT    Lennox Tran DO  05/22/25  16:52 EDT

## 2025-05-23 ENCOUNTER — PATIENT MESSAGE (OUTPATIENT)
Dept: FAMILY MEDICINE CLINIC | Facility: CLINIC | Age: 50
End: 2025-05-23
Payer: COMMERCIAL

## 2025-05-26 RX ORDER — CIPROFLOXACIN 500 MG/1
500 TABLET, FILM COATED ORAL 2 TIMES DAILY
Qty: 28 TABLET | Refills: 0 | Status: SHIPPED | OUTPATIENT
Start: 2025-05-26 | End: 2025-05-27 | Stop reason: SDUPTHER

## 2025-05-26 RX ORDER — METRONIDAZOLE 500 MG/1
500 TABLET ORAL 3 TIMES DAILY
Qty: 42 TABLET | Refills: 0 | Status: SHIPPED | OUTPATIENT
Start: 2025-05-26 | End: 2025-05-27 | Stop reason: SDUPTHER

## 2025-05-27 RX ORDER — METRONIDAZOLE 500 MG/1
500 TABLET ORAL 3 TIMES DAILY
Qty: 42 TABLET | Refills: 0 | Status: SHIPPED | OUTPATIENT
Start: 2025-05-27 | End: 2025-06-10

## 2025-05-27 RX ORDER — CIPROFLOXACIN 500 MG/1
500 TABLET, FILM COATED ORAL 2 TIMES DAILY
Qty: 28 TABLET | Refills: 0 | Status: SHIPPED | OUTPATIENT
Start: 2025-05-27 | End: 2025-06-10

## 2025-05-29 ENCOUNTER — TELEPHONE (OUTPATIENT)
Dept: FAMILY MEDICINE CLINIC | Facility: CLINIC | Age: 50
End: 2025-05-29

## 2025-05-29 NOTE — TELEPHONE ENCOUNTER
Phone call placed to patient with request to followup on Monday for any further symptoms since she took her last two this am with agreement to do so.

## 2025-05-29 NOTE — TELEPHONE ENCOUNTER
Caller: Maria Ines Garcia    Relationship to patient: Self    Best call back number: [phone]    Patient is needing: Patient called in and said that she would like a call back from [facility] if possible. Patient is taking her last dose of medication for yeast infection and would like to be re-tested for yeast infection. Patient said it is okay to leave message on phone.

## 2025-06-01 ENCOUNTER — PATIENT ROUNDING (BHMG ONLY) (OUTPATIENT)
Dept: URGENT CARE | Facility: CLINIC | Age: 50
End: 2025-06-01
Payer: COMMERCIAL

## 2025-06-01 NOTE — ED NOTES
Thank you for letting us care for you in your recent visit to our urgent care center. We would love to hear about your experience with us. Was this the first time you have visited our location?    We're always looking for ways to make our patients' experiences even better. Do you have any recommendations on ways we may improve?     I appreciate you taking the time to respond. Please be on the lookout for a survey about your recent visit from [employer] via text or email. We would greatly appreciate if you could fill that out and turn it back in. We want your voice to be heard and we value your feedback.   Thank you for choosing [facility] for your healthcare needs.

## 2025-06-01 NOTE — ED NOTES
Thank you for letting us care for you in your recent visit to our urgent care center. We would love to hear about your experience with us. Was this the first time you have visited our location?    We're always looking for ways to make our patients' experiences even better. Do you have any recommendations on ways we may improve?     I appreciate you taking the time to respond. Please be on the lookout for a survey about your recent visit from [employer] via text or email. We would greatly appreciate if you could fill that out and turn it back in. We want your voice to be heard and we value your feedback.   Thank you for choosing [facility] for your healthcare needs.

## 2025-06-04 ENCOUNTER — TELEPHONE (OUTPATIENT)
Dept: FAMILY MEDICINE CLINIC | Facility: CLINIC | Age: 50
End: 2025-06-04

## 2025-06-04 NOTE — TELEPHONE ENCOUNTER
Caller: Maria Ines Garcia    Relationship to patient: Self    Best call back number: [phone]    Patient is needing: Patient called in and would like to have an order to test again for yeast infection. Patient said that she is still having discharge and light symptoms. Patient is requesting a call back please.

## 2025-06-05 ENCOUNTER — OFFICE VISIT (OUTPATIENT)
Dept: SURGERY | Facility: CLINIC | Age: 50
End: 2025-06-05
Payer: COMMERCIAL

## 2025-06-05 ENCOUNTER — PATIENT MESSAGE (OUTPATIENT)
Dept: FAMILY MEDICINE CLINIC | Facility: CLINIC | Age: 50
End: 2025-06-05
Payer: COMMERCIAL

## 2025-06-05 VITALS — HEIGHT: 64 IN | BODY MASS INDEX: 29.01 KG/M2 | WEIGHT: 169.9 LBS

## 2025-06-05 DIAGNOSIS — N89.8 VAGINAL ITCHING: Primary | ICD-10-CM

## 2025-06-05 DIAGNOSIS — K57.32 SIGMOID DIVERTICULITIS: Primary | ICD-10-CM

## 2025-06-05 NOTE — LETTER
2025     Lennox Tran DO  [address]  [address]  [facility] [address]    Patient: Maria Ines Garcia   YOB: 1975   Date of Visit: 2025     Dear Lennox Tran DO:       Thank you for referring Maria Ines Garcia to me for evaluation. Below are the relevant portions of my assessment and plan of care.    If you have questions, please do not hesitate to call me. I look forward to following Maria Ines along with you.         Sincerely,        Long Flowers MD        CC: MD Kristie Piedra, Long Van MD  25 [phone]  Sign when Signing Visit  General Surgery/Colorectal Surgery Note    Patient Name:  Maria Ines Garcia  YOB: 1975  [id number]    Referring Provider: Lennox Tran DO      Patient Care Team:  Lennox Tran DO as PCP - General (Family Medicine)  Leida Nielsen, [facility]CO as Ambulatory  ([facility])    Chief complaint diverticulitis    Subjective.     History of present illness:    History of multiple episodes of diverticulitis since 2024.  She has been on multiple rounds of antibiotics.  No history of hospitalization or abscess.  CT abdomen and pelvis 2025 with sigmoid diverticulitis.  No previous abdominal surgery.  No blood thinner use.  No tobacco use.  No chest pain.  Colonoscopy by Dr. Rosa May 2025 with diverticulosis and polyps removed.      History:  Past Medical History:   Diagnosis Date   • Abnormal Pap smear of cervix 2024    LGSIL   • Allergic    • Anxiety    • COVID-19 2023   • Diverticulitis of colon    • Diverticulosis    • GI (gastrointestinal bleed)    • Heavy periods    • History of hepatitis B    • Hyperlipidemia    • Hypertension    • Infectious viral hepatitis     Hep B   • Rosacea    • Seasonal allergies    • Uterine fibroid    • Varicella        Past Surgical History:   Procedure Laterality Date   •  SECTION     • COLONOSCOPY     • COLONOSCOPY N/A  2025    Procedure: COLONOSCOPY with cold snare polypectomy, biopsies;  Surgeon: Jose Rosa MD;  Location: [facility] ENDOSCOPY;  Service: Gastroenterology;  Laterality: N/A;  diverticulosis, colon polyp,   • COLPOSCOPY  2024   • CRANIOPLASTY FOR CRANIAL DEFECT      performed to design plates, 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,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   • D & C HYSTEROSCOPY N/A 2019    Procedure: AND MYOSURE;  Surgeon: Mukund Castro MD;  Location: [facility];  Service: Obstetrics/Gynecology   • D & C HYSTEROSCOPY ENDOMETRIAL ABLATION N/A 2019    Procedure: DILATATION AND CURETTAGE HYSTEROSCOPY WITH NOVASURE ENDOMETRIAL ABLATION;  Surgeon: Mukund Castro MD;  Location: [facility];  Service: Obstetrics/Gynecology   • DILATATION AND CURETTAGE      MAB   • TONSILLECTOMY AND ADENOIDECTOMY     • TUBAL ABDOMINAL LIGATION Bilateral        Family History   Problem Relation Age of Onset   • Emphysema Father    • COPD Father    • Heart disease Mother    • Hypertension Mother    • Diabetes Mother    • Coronary artery disease Mother    • Arrhythmia Maternal Grandmother    • Heart disease Paternal Uncle    • Malig Hyperthermia Neg Hx    • Breast cancer Neg Hx    • Ovarian cancer Neg Hx    • Uterine cancer Neg Hx    • Colon cancer Neg Hx        Social History     Tobacco Use   • Smoking status: Former     Current packs/day: 0.00     Average packs/day: 1.5 packs/day for 19.4 years (29.1 ttl pk-yrs)     Types: Cigarettes     Start date: 8/3/1989     Quit date: 2009     Years since quittin.4     Passive exposure: Never   • Smokeless tobacco: Never   Vaping Use   • Vaping status: Never Used   Substance Use Topics   • Alcohol use: Not Currently     Comment: once per week or less   • Drug use: Never       Review of Systems  All systems were reviewed and negative except for:   Review of Systems   Constitutional: Negative for chills, fever and unexpected  weight loss.   HENT: Negative for congestion, nosebleeds and voice change.    Eyes: Negative for blurred vision, double vision and discharge.   Respiratory: Negative for apnea, chest tightness and shortness of breath.    Cardiovascular: Negative for chest pain and leg swelling.   Gastrointestinal:        See HPI   Endocrine: Negative for cold intolerance and heat intolerance.   Genitourinary: Negative for dysuria, hematuria and urgency.   Musculoskeletal: Negative for back pain, joint swelling and neck pain.   Skin: Negative for color change and dry skin.   Neurological: Negative for dizziness and confusion.   Hematological: Negative for adenopathy.   Psychiatric/Behavioral: Negative for agitation and behavioral problems.     MEDS:  Prior to Admission medications    Medication Sig Start Date End Date Taking? Authorizing Provider   bisoprolol (ZEBeta) 5 MG tablet Take 1 tablet by mouth Daily. 12/6/24  Yes Mae Espino MD   buPROPion XL (WELLBUTRIN XL) 150 MG 24 hr tablet TAKE 1 TABLET BY MOUTH EVERY MORNING 3/4/25 3/4/26 Yes Lennox Tran DO   ciprofloxacin (Cipro) 500 MG tablet Take 1 tablet by mouth 2 (Two) Times a Day for 14 days. 5/27/25 6/19/25 Yes Lennox Tran DO   Fexofenadine HCl (ALLERGY 24-HR PO) Take 1 tablet by mouth Every Morning.   Yes ProviderStefani MD   metroNIDAZOLE (FLAGYL) 500 MG tablet Take 1 tablet by mouth 3 (Three) Times a Day for 14 days. 5/27/25 6/19/25 Yes Lennox Tran DO   rosuvastatin (CRESTOR) 5 MG tablet Take 1 tablet by mouth Daily. 3/4/25  Yes Lennox Tran DO   vitamin B-12 (CYANOCOBALAMIN) 1000 MCG tablet Take 1 tablet by mouth Daily. 3/4/25  Yes Lennox Tran DO   vitamin D (ERGOCALCIFEROL) 1.25 MG (60212 UT) capsule capsule Take 1 capsule by mouth 1 (One) Time Per Week. 12/6/24  Yes Lennox Tran DO   docusate sodium (Colace) 100 MG capsule Take 1 capsule by mouth 2 (Two) Times a Day. 3/11/25   Darline Boland, APRN     "    Allergies:  Patient has no known allergies.    Objective    Vital Signs   BP: (135)/(83) 135/83    Physical Exam:     General Appearance:    Alert, cooperative, in no acute distress   Head:    Normocephalic, without obvious abnormality, atraumatic   Eyes:          Conjunctivae and sclerae normal, no icterus,     Ears:    Ears appear intact with no abnormalities noted   Throat:   No oral lesions, no thrush, oral mucosa moist   Neck:   No adenopathy, supple, trachea midline, no thyromegaly   Back:     No kyphosis present, no scoliosis present, no skin lesions,      erythema or scars, no tenderness to percussion or                   palpation,   range of motion normal   Lungs:     Clear to auscultation,respirations regular, even and                  unlabored    Heart:    Regular rhythm and normal rate, normal S1 and S2, no            murmur, no gallop, no rub, no click   Chest Wall:    No abnormalities observed   Abdomen:     Normal bowel sounds, no masses, no organomegaly, soft        non-tender, non-distended, no guarding, no rebound                tenderness   Rectal:        Extremities:   Moves all extremities well, no edema, no cyanosis, no             redness   Pulses:   Pulses palpable and equal bilaterally   Skin:   No bleeding, bruising or rash   Lymph nodes:   No palpable adenopathy   Neurologic:   A/o x 4 with no deficits       Results Review:   {Results Review:[id number]::\"I reviewed the patient's new clinical results.\"    LABS/IMAGING:  Results for orders placed or performed in visit on 05/22/25   [facility] Vaginosis Panel - Swab, Vagina    Collection Time: 05/22/25  4:54 PM    Specimen: Vagina; Swab   Result Value Ref Range    Bacterial vaginosis Negative Negative    Candida glabrata/krusei Detected (A) Not Detected    CANDIDA GROUP Not Detected Not Detected    Trichomonas vaginalis Not Detected Not Detected        Result Review: Labs  Result Review  Imaging  Med Tab  Media     Assessment & Plan    Multiple " episodes of sigmoid diverticulitis    Discussion with patient.  I reviewed her CAT scan findings and recent colonoscopy.  With her multiple episodes of sigmoid diverticulitis, I recommended robotic possible open sigmoid colectomy.  Procedure and recovery discussed.  Benefits and alternatives discussed.  Risk procedure including risk of anesthesia, bleeding, infection, conversion open, damage to surrounding structures including the ureter, heart attack, stroke, blood clot, pneumonia, hernia, anastomotic leak discussed.  All questions answered.  She will think about it.  If she proceeds she was instructed to use chlorhexidine the night before surgery.  Oral and mechanical bowel prep.  Thank you for the consult.           This document has been electronically signed by Long Flowers MD  June 6, 2025 17:41 EDT   Chest X Ray ordered

## 2025-06-06 ENCOUNTER — OFFICE VISIT (OUTPATIENT)
Dept: OBSTETRICS AND GYNECOLOGY | Facility: CLINIC | Age: 50
End: 2025-06-06
Payer: COMMERCIAL

## 2025-06-06 ENCOUNTER — PROCEDURE VISIT (OUTPATIENT)
Dept: OBSTETRICS AND GYNECOLOGY | Facility: CLINIC | Age: 50
End: 2025-06-06
Payer: COMMERCIAL

## 2025-06-06 VITALS — BODY MASS INDEX: 28.65 KG/M2 | SYSTOLIC BLOOD PRESSURE: 135 MMHG | DIASTOLIC BLOOD PRESSURE: 83 MMHG | WEIGHT: 167 LBS

## 2025-06-06 DIAGNOSIS — N76.0 VULVOVAGINITIS: ICD-10-CM

## 2025-06-06 DIAGNOSIS — N81.2 UTEROVAGINAL PROLAPSE, INCOMPLETE: ICD-10-CM

## 2025-06-06 DIAGNOSIS — Z01.419 VISIT FOR GYNECOLOGIC EXAMINATION: Primary | ICD-10-CM

## 2025-06-06 DIAGNOSIS — Z12.31 VISIT FOR SCREENING MAMMOGRAM: Primary | ICD-10-CM

## 2025-06-06 PROCEDURE — 99396 PREV VISIT EST AGE 40-64: CPT | Performed by: OBSTETRICS & GYNECOLOGY

## 2025-06-06 NOTE — PROGRESS NOTES
[facility]  [address]  [place], [place] [address]  Phone: [phone] / Fax:  [phone]      2025    [address] [facility] [place] [address]    Lennox Tran DO    Chief Complaint   Patient presents with    Gynecologic Exam     Last pap 6/3/24  Last mammo 6/3/24  Pt stated she believes she may still have a yeast infection.        Maria Ines Garcia is here for annual gynecologic exam.  HPI - Patient with normal pap one year ago.  She had a normal mammogram last year and underwent screening again today.  Patient reports vaginal irritation and itching; she was treated for presumptive yeast infection but symptoms persist.  She is requesting testing.  She had a colonoscopy recently with diverticular disease and a recommendation for re-screening in 5 years.  She reports some prolapse symptoms.    Past Medical History:   Diagnosis Date    Abnormal Pap smear of cervix 2024    LGSIL    Allergic     Anxiety     COVID-19 2023    Diverticulitis of colon     Diverticulosis     GI (gastrointestinal bleed)     Heavy periods     History of hepatitis B     Hyperlipidemia     Hypertension     Infectious viral hepatitis     Hep B    Rosacea     Seasonal allergies     Uterine fibroid     Varicella        Past Surgical History:   Procedure Laterality Date     SECTION      COLONOSCOPY      COLONOSCOPY N/A 2025    Procedure: COLONOSCOPY with cold snare polypectomy, biopsies;  Surgeon: Jose Rosa MD;  Location: [facility] ENDOSCOPY;  Service: Gastroenterology;  Laterality: N/A;  diverticulosis, colon polyp,    COLPOSCOPY  2024    CRANIOPLASTY FOR CRANIAL DEFECT      performed to design plates, 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;  Service: Obstetrics/Gynecology    DILATATION AND CURETTAGE      MAB    TONSILLECTOMY AND ADENOIDECTOMY  1978    TUBAL ABDOMINAL LIGATION Bilateral 1999       No Known Allergies    Social History     Socioeconomic History    Marital status:    Tobacco Use    Smoking status: Former     Current packs/day: 0.00     Average packs/day: 1.5 packs/day for 19.4 years (29.1 ttl pk-yrs)     Types: Cigarettes     Start date: 8/3/1989     Quit date: 2009     Years since quittin.4     Passive exposure: Never    Smokeless tobacco: Never   Vaping Use    Vaping status: Never Used   Substance and Sexual Activity    Alcohol use: Not Currently     Comment: once per week or less    Drug use: Never    Sexual activity: Defer       Family History   Problem Relation Age of Onset    Emphysema Father     COPD Father     Heart disease Mother     Hypertension Mother     Diabetes Mother     Coronary artery disease Mother     Arrhythmia Maternal Grandmother     Heart disease Paternal Uncle     Malig Hyperthermia Neg Hx     Breast cancer Neg Hx     Ovarian cancer Neg Hx     Uterine cancer Neg Hx     Colon cancer Neg Hx        No LMP recorded. (Menstrual status: Tubal ligation).    OB History          3    Para        Term                AB   1    Living             SAB   1    IAB        Ectopic        Molar        Multiple        Live Births   2                Vitals:    25 1136   BP: 135/83   Weight: 75.8 kg (167 lb)       Physical Exam  Constitutional:       Appearance: Normal appearance. She is well-developed.   Genitourinary:      Right Labia: No tenderness or lesions.     Left Labia: No tenderness or lesions.     No vaginal discharge or tenderness.        Right Adnexa: not tender and not full.     Left  Adnexa: not tender and not full.     No cervical motion tenderness or lesion.      Uterus is prolapsed.      Uterus is not enlarged or tender.   Breasts:     Right: No mass or nipple discharge.      Left: No mass or nipple discharge.   HENT:      Right Ear: External ear normal.      Left Ear: External ear normal.      Nose: Nose normal.   Eyes:      Conjunctiva/sclera: Conjunctivae normal.   Neck:      Thyroid: No thyromegaly.   Cardiovascular:      Rate and Rhythm: Normal rate and regular rhythm.      Heart sounds: Normal heart sounds.   Pulmonary:      Effort: Pulmonary effort is normal.      Breath sounds: No stridor. No wheezing.   Abdominal:      Palpations: Abdomen is soft.      Tenderness: There is no abdominal tenderness. There is no guarding or rebound.   Musculoskeletal:         General: Normal range of motion.      Cervical back: Normal range of motion and neck supple.   Neurological:      Mental Status: She is alert.      Coordination: Coordination normal.   Skin:     General: Skin is warm and dry.   Psychiatric:         Mood and Affect: Mood normal.         Behavior: Behavior normal.         Thought Content: Thought content normal.         Judgment: Judgment normal.   Vitals reviewed. Exam conducted with a chaperone present.         Diagnoses and all orders for this visit:    1. Visit for gynecologic examination (Primary)        -     Discussed importance of regular screening and breast awareness.  Screening up to date.  2. Vulvovaginitis  -     NuSwab VG+ - Swab, Vagina  -     Await swab.  If negative, consideration for hormonal cream.  3. Uterovaginal prolapse, incomplete        -     Patient with mild prolapse.  For now, consider conservative measures.      Mukund Castro MD

## 2025-06-06 NOTE — TELEPHONE ENCOUNTER
I went ahead and placed a lab order.  Please have patient come in at her convenience to have this done.

## 2025-06-06 NOTE — PROGRESS NOTES
General Surgery/Colorectal Surgery Note    Patient Name:  Maria Ines Garcia  YOB: 1975  [id number]    Referring Provider: Lennox Tran DO      Patient Care Team:  Lennox Tran DO as PCP - General (Family Medicine)  Leida iNelsen, RN as Ambulatory  ([facility])    Chief complaint diverticulitis    Subjective .     History of present illness:    History of multiple episodes of diverticulitis since 2024.  She has been on multiple rounds of antibiotics.  No history of hospitalization or abscess.  CT abdomen and pelvis 2025 with sigmoid diverticulitis.  No previous abdominal surgery.  No blood thinner use.  No tobacco use.  No chest pain.  Colonoscopy by Dr. Rosa May 2025 with diverticulosis and polyps removed.      History:  Past Medical History:   Diagnosis Date    Abnormal Pap smear of cervix 2024    LGSIL    Allergic     Anxiety     COVID-19 2023    Diverticulitis of colon     Diverticulosis     GI (gastrointestinal bleed)     Heavy periods     History of hepatitis B     Hyperlipidemia     Hypertension     Infectious viral hepatitis     Hep B    Rosacea     Seasonal allergies     Uterine fibroid     Varicella        Past Surgical History:   Procedure Laterality Date     SECTION      COLONOSCOPY      COLONOSCOPY N/A 2025    Procedure: COLONOSCOPY with cold snare polypectomy, biopsies;  Surgeon: Jose Rosa MD;  Location: [facility] ENDOSCOPY;  Service: Gastroenterology;  Laterality: N/A;  diverticulosis, colon polyp,    COLPOSCOPY  2024    CRANIOPLASTY FOR CRANIAL DEFECT      performed to design plates, 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,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    D & C HYSTEROSCOPY N/A 2019    Procedure: AND MYOSURE;  Surgeon: Mukund Castro MD;  Location: [facility] OR;  Service: Obstetrics/Gynecology    D & C HYSTEROSCOPY ENDOMETRIAL ABLATION N/A 2019    Procedure:  DILATATION AND CURETTAGE HYSTEROSCOPY WITH NOVASURE ENDOMETRIAL ABLATION;  Surgeon: Mukund Castro MD;  Location: [facility] OR;  Service: Obstetrics/Gynecology    DILATATION AND CURETTAGE      MAB    TONSILLECTOMY AND ADENOIDECTOMY  1978    TUBAL ABDOMINAL LIGATION Bilateral 1999       Family History   Problem Relation Age of Onset    Emphysema Father     COPD Father     Heart disease Mother     Hypertension Mother     Diabetes Mother     Coronary artery disease Mother     Arrhythmia Maternal Grandmother     Heart disease Paternal Uncle     Malig Hyperthermia Neg Hx     Breast cancer Neg Hx     Ovarian cancer Neg Hx     Uterine cancer Neg Hx     Colon cancer Neg Hx        Social History     Tobacco Use    Smoking status: Former     Current packs/day: 0.00     Average packs/day: 1.5 packs/day for 19.4 years (29.1 ttl pk-yrs)     Types: Cigarettes     Start date: 8/3/1989     Quit date: 2009     Years since quittin.4     Passive exposure: Never    Smokeless tobacco: Never   Vaping Use    Vaping status: Never Used   Substance Use Topics    Alcohol use: Not Currently     Comment: once per week or less    Drug use: Never       Review of Systems  All systems were reviewed and negative except for:   Review of Systems   Constitutional: Negative for chills, fever and unexpected weight loss.   HENT: Negative for congestion, nosebleeds and voice change.    Eyes: Negative for blurred vision, double vision and discharge.   Respiratory: Negative for apnea, chest tightness and shortness of breath.    Cardiovascular: Negative for chest pain and leg swelling.   Gastrointestinal:        See HPI   Endocrine: Negative for cold intolerance and heat intolerance.   Genitourinary: Negative for dysuria, hematuria and urgency.   Musculoskeletal: Negative for back pain, joint swelling and neck pain.   Skin: Negative for color change and dry skin.   Neurological: Negative for dizziness and confusion.   Hematological:  Negative for adenopathy.   Psychiatric/Behavioral: Negative for agitation and behavioral problems.     MEDS:  Prior to Admission medications    Medication Sig Start Date End Date Taking? Authorizing Provider   bisoprolol (ZEBeta) 5 MG tablet Take 1 tablet by mouth Daily. 12/6/24  Yes Mae Espino MD   buPROPion XL (WELLBUTRIN XL) 150 MG 24 hr tablet TAKE 1 TABLET BY MOUTH EVERY MORNING 3/4/25 3/4/26 Yes Lennox Tran DO   ciprofloxacin (Cipro) 500 MG tablet Take 1 tablet by mouth 2 (Two) Times a Day for 14 days. 5/27/25 6/19/25 Yes Lennox Tran DO   Fexofenadine HCl (ALLERGY 24-HR PO) Take 1 tablet by mouth Every Morning.   Yes Provider, MD Stefani   metroNIDAZOLE (FLAGYL) 500 MG tablet Take 1 tablet by mouth 3 (Three) Times a Day for 14 days. 5/27/25 6/19/25 Yes Lennox Tran DO   rosuvastatin (CRESTOR) 5 MG tablet Take 1 tablet by mouth Daily. 3/4/25  Yes Lennox Tran DO   vitamin B-12 (CYANOCOBALAMIN) 1000 MCG tablet Take 1 tablet by mouth Daily. 3/4/25  Yes Lennox Tran DO   vitamin D (ERGOCALCIFEROL) 1.25 MG (64111 UT) capsule capsule Take 1 capsule by mouth 1 (One) Time Per Week. 12/6/24  Yes Lennox Tran DO   docusate sodium (Colace) 100 MG capsule Take 1 capsule by mouth 2 (Two) Times a Day. 3/11/25   Darline Boland, PEBBLES        Allergies:  Patient has no known allergies.    Objective     Vital Signs   BP: (135)/(83) 135/83    Physical Exam:     General Appearance:    Alert, cooperative, in no acute distress   Head:    Normocephalic, without obvious abnormality, atraumatic   Eyes:          Conjunctivae and sclerae normal, no icterus,     Ears:    Ears appear intact with no abnormalities noted   Throat:   No oral lesions, no thrush, oral mucosa moist   Neck:   No adenopathy, supple, trachea midline, no thyromegaly   Back:     No kyphosis present, no scoliosis present, no skin lesions,      erythema or scars, no tenderness to percussion or                "    palpation,   range of motion normal   Lungs:     Clear to auscultation,respirations regular, even and                  unlabored    Heart:    Regular rhythm and normal rate, normal S1 and S2, no            murmur, no gallop, no rub, no click   Chest Wall:    No abnormalities observed   Abdomen:     Normal bowel sounds, no masses, no organomegaly, soft        non-tender, non-distended, no guarding, no rebound                tenderness   Rectal:        Extremities:   Moves all extremities well, no edema, no cyanosis, no             redness   Pulses:   Pulses palpable and equal bilaterally   Skin:   No bleeding, bruising or rash   Lymph nodes:   No palpable adenopathy   Neurologic:   A/o x 4 with no deficits       Results Review:   {Results Review:76095::\"I reviewed the patient's new clinical results.\"    LABS/IMAGING:  Results for orders placed or performed in visit on 05/22/25   [facility] Vaginosis Panel - Swab, Vagina    Collection Time: 05/22/25  4:54 PM    Specimen: Vagina; Swab   Result Value Ref Range    Bacterial vaginosis Negative Negative    Candida glabrata/krusei Detected (A) Not Detected    CANDIDA GROUP Not Detected Not Detected    Trichomonas vaginalis Not Detected Not Detected        Result Review : Labs  Result Review  Imaging  Med Tab  Media     Assessment & Plan     Multiple episodes of sigmoid diverticulitis    Discussion with patient.  I reviewed her CAT scan findings and recent colonoscopy.  With her multiple episodes of sigmoid diverticulitis, I recommended robotic possible open sigmoid colectomy.  Procedure and recovery discussed.  Benefits and alternatives discussed.  Risk procedure including risk of anesthesia, bleeding, infection, conversion open, damage to surrounding structures including the ureter, heart attack, stroke, blood clot, pneumonia, hernia, anastomotic leak discussed.  All questions answered.  She will think about it.  If she proceeds she was instructed to use chlorhexidine the " night before surgery.  Oral and mechanical bowel prep.  Thank you for the consult.           This document has been electronically signed by Long Flowers MD  June 6, 2025 17:41 EDT

## 2025-06-09 ENCOUNTER — TELEPHONE (OUTPATIENT)
Dept: OBSTETRICS AND GYNECOLOGY | Facility: CLINIC | Age: 50
End: 2025-06-09
Payer: COMMERCIAL

## 2025-06-09 LAB
A VAGINAE DNA VAG QL NAA+PROBE: NORMAL SCORE
BVAB2 DNA VAG QL NAA+PROBE: NORMAL SCORE
C ALBICANS DNA VAG QL NAA+PROBE: NEGATIVE
C GLABRATA DNA VAG QL NAA+PROBE: NEGATIVE
C TRACH DNA SPEC QL NAA+PROBE: NEGATIVE
MEGA1 DNA VAG QL NAA+PROBE: NORMAL SCORE
N GONORRHOEA DNA VAG QL NAA+PROBE: NEGATIVE
T VAGINALIS DNA VAG QL NAA+PROBE: NEGATIVE

## 2025-06-09 NOTE — TELEPHONE ENCOUNTER
Pt is aware of vaginal culture being negative for infection. However Pt is requesting prescription for hormonal cream that was previously discussed at last appt with Pt.  thanks

## 2025-06-10 ENCOUNTER — PREP FOR SURGERY (OUTPATIENT)
Dept: OTHER | Facility: HOSPITAL | Age: 50
End: 2025-06-10
Payer: COMMERCIAL

## 2025-06-10 ENCOUNTER — TELEPHONE (OUTPATIENT)
Dept: SURGERY | Facility: CLINIC | Age: 50
End: 2025-06-10
Payer: COMMERCIAL

## 2025-06-10 DIAGNOSIS — K57.32 SIGMOID DIVERTICULITIS: Primary | ICD-10-CM

## 2025-06-10 RX ORDER — SODIUM CHLORIDE, SODIUM LACTATE, POTASSIUM CHLORIDE, CALCIUM CHLORIDE 600; 310; 30; 20 MG/100ML; MG/100ML; MG/100ML; MG/100ML
50 INJECTION, SOLUTION INTRAVENOUS CONTINUOUS
OUTPATIENT
Start: 2025-06-10 | End: 2025-06-11

## 2025-06-10 RX ORDER — ALVIMOPAN 12 MG/1
12 CAPSULE ORAL ONCE
OUTPATIENT
Start: 2025-06-10 | End: 2025-06-10

## 2025-06-10 RX ORDER — SODIUM CHLORIDE 0.9 % (FLUSH) 0.9 %
10 SYRINGE (ML) INJECTION EVERY 12 HOURS SCHEDULED
OUTPATIENT
Start: 2025-06-10

## 2025-06-10 RX ORDER — METRONIDAZOLE 500 MG/1
TABLET ORAL
Qty: 6 TABLET | Refills: 0 | Status: SHIPPED | OUTPATIENT
Start: 2025-06-10

## 2025-06-10 RX ORDER — SODIUM, POTASSIUM,MAG SULFATES 17.5-3.13G
1 SOLUTION, RECONSTITUTED, ORAL ORAL EVERY 12 HOURS
Qty: 354 ML | Refills: 0 | Status: SHIPPED | OUTPATIENT
Start: 2025-06-10

## 2025-06-10 RX ORDER — SODIUM CHLORIDE 9 MG/ML
40 INJECTION, SOLUTION INTRAVENOUS AS NEEDED
OUTPATIENT
Start: 2025-06-10

## 2025-06-10 RX ORDER — NEOMYCIN SULFATE 500 MG/1
TABLET ORAL
Qty: 6 TABLET | Refills: 0 | Status: SHIPPED | OUTPATIENT
Start: 2025-06-10

## 2025-06-10 RX ORDER — METRONIDAZOLE 500 MG/100ML
500 INJECTION, SOLUTION INTRAVENOUS ONCE
OUTPATIENT
Start: 2025-06-10 | End: 2025-06-10

## 2025-06-10 RX ORDER — SODIUM CHLORIDE 0.9 % (FLUSH) 0.9 %
10 SYRINGE (ML) INJECTION AS NEEDED
OUTPATIENT
Start: 2025-06-10

## 2025-06-10 NOTE — TELEPHONE ENCOUNTER
PATIENT WAS SEEN IN THE OFFICE AND 6/5 AND SURGERY WAS DISCUSSED. SHE HAS DECIDED TO PROCEED WITH ROBOTIC SIGMOID COLECTOMY AND WOULD LIKE TO SCHEDULE. INFORMED PATIENT THAT SHE WOULD GET A CALL FROM US WITH INSTRUCTIONS ON THE ORAL AND MECHANICAL BOWEL PREP AND SURGERY DATE ONCE DR DOMINGUEZ PLACED THE ORDERS.

## 2025-06-11 RX ORDER — ESTRADIOL 0.1 MG/G
1 CREAM VAGINAL DAILY
Qty: 42.5 G | Refills: 3 | Status: SHIPPED | OUTPATIENT
Start: 2025-06-11

## 2025-06-20 ENCOUNTER — TELEPHONE (OUTPATIENT)
Dept: SURGERY | Facility: CLINIC | Age: 50
End: 2025-06-20
Payer: COMMERCIAL

## 2025-06-20 NOTE — TELEPHONE ENCOUNTER
Called patient to make her aware that disability paperwork was received. No answer left VM. Needs to sign JANEL.

## 2025-06-20 NOTE — TELEPHONE ENCOUNTER
Patient walked into clinic, paid $25 fee, completed JANEL[facility]

## 2025-07-11 ENCOUNTER — PRE-ADMISSION TESTING (OUTPATIENT)
Dept: PREADMISSION TESTING | Facility: HOSPITAL | Age: 50
End: 2025-07-11
Payer: COMMERCIAL

## 2025-07-11 VITALS
DIASTOLIC BLOOD PRESSURE: 81 MMHG | HEART RATE: 66 BPM | BODY MASS INDEX: 28.23 KG/M2 | WEIGHT: 165.34 LBS | HEIGHT: 64 IN | SYSTOLIC BLOOD PRESSURE: 122 MMHG | RESPIRATION RATE: 18 BRPM | OXYGEN SATURATION: 94 % | TEMPERATURE: 98.7 F

## 2025-07-11 DIAGNOSIS — Z01.818 PREOP TESTING: Primary | ICD-10-CM

## 2025-07-11 LAB
ABO GROUP BLD: NORMAL
ALBUMIN SERPL-MCNC: 4.2 G/DL (ref 3.5–5.2)
ALBUMIN/GLOB SERPL: 1.5 G/DL
ALP SERPL-CCNC: 85 U/L (ref 39–117)
ALT SERPL W P-5'-P-CCNC: 13 U/L (ref 1–33)
ANION GAP SERPL CALCULATED.3IONS-SCNC: 9.6 MMOL/L (ref 5–15)
AST SERPL-CCNC: 15 U/L (ref 1–32)
BASOPHILS # BLD AUTO: 0.03 10*3/MM3 (ref 0–0.2)
BASOPHILS NFR BLD AUTO: 0.4 % (ref 0–1.5)
BILIRUB SERPL-MCNC: 0.2 MG/DL (ref 0–1.2)
BUN SERPL-MCNC: 9.4 MG/DL (ref 6–20)
BUN/CREAT SERPL: 11.8 (ref 7–25)
CALCIUM SPEC-SCNC: 10 MG/DL (ref 8.6–10.5)
CHLORIDE SERPL-SCNC: 101 MMOL/L (ref 98–107)
CO2 SERPL-SCNC: 25.4 MMOL/L (ref 22–29)
CREAT SERPL-MCNC: 0.8 MG/DL (ref 0.57–1)
DEPRECATED RDW RBC AUTO: 41.8 FL (ref 37–54)
EGFRCR SERPLBLD CKD-EPI 2021: 89.3 ML/MIN/1.73
EOSINOPHIL # BLD AUTO: 0.13 10*3/MM3 (ref 0–0.4)
EOSINOPHIL NFR BLD AUTO: 1.7 % (ref 0.3–6.2)
ERYTHROCYTE [DISTWIDTH] IN BLOOD BY AUTOMATED COUNT: 13 % (ref 12.3–15.4)
GLOBULIN UR ELPH-MCNC: 2.8 GM/DL
GLUCOSE SERPL-MCNC: 125 MG/DL (ref 65–99)
HCT VFR BLD AUTO: 39.3 % (ref 34–46.6)
HGB BLD-MCNC: 12.7 G/DL (ref 12–15.9)
IMM GRANULOCYTES # BLD AUTO: 0.01 10*3/MM3 (ref 0–0.05)
IMM GRANULOCYTES NFR BLD AUTO: 0.1 % (ref 0–0.5)
LYMPHOCYTES # BLD AUTO: 1.85 10*3/MM3 (ref 0.7–3.1)
LYMPHOCYTES NFR BLD AUTO: 24.8 % (ref 19.6–45.3)
MCH RBC QN AUTO: 28.3 PG (ref 26.6–33)
MCHC RBC AUTO-ENTMCNC: 32.3 G/DL (ref 31.5–35.7)
MCV RBC AUTO: 87.7 FL (ref 79–97)
MONOCYTES # BLD AUTO: 0.51 10*3/MM3 (ref 0.1–0.9)
MONOCYTES NFR BLD AUTO: 6.8 % (ref 5–12)
NEUTROPHILS NFR BLD AUTO: 4.92 10*3/MM3 (ref 1.7–7)
NEUTROPHILS NFR BLD AUTO: 66.2 % (ref 42.7–76)
NRBC BLD AUTO-RTO: 0 /100 WBC (ref 0–0.2)
PLATELET # BLD AUTO: 218 10*3/MM3 (ref 140–450)
PMV BLD AUTO: 11.5 FL (ref 6–12)
POTASSIUM SERPL-SCNC: 4.3 MMOL/L (ref 3.5–5.2)
PROT SERPL-MCNC: 7 G/DL (ref 6–8.5)
RBC # BLD AUTO: 4.48 10*6/MM3 (ref 3.77–5.28)
RH BLD: NEGATIVE
SODIUM SERPL-SCNC: 136 MMOL/L (ref 136–145)
WBC NRBC COR # BLD AUTO: 7.45 10*3/MM3 (ref 3.4–10.8)

## 2025-07-11 PROCEDURE — 80053 COMPREHEN METABOLIC PANEL: CPT

## 2025-07-11 PROCEDURE — 85025 COMPLETE CBC W/AUTO DIFF WBC: CPT

## 2025-07-11 PROCEDURE — 36415 COLL VENOUS BLD VENIPUNCTURE: CPT

## 2025-07-11 PROCEDURE — 86900 BLOOD TYPING SEROLOGIC ABO: CPT

## 2025-07-11 PROCEDURE — 86901 BLOOD TYPING SEROLOGIC RH(D): CPT

## 2025-07-11 NOTE — DISCHARGE INSTRUCTIONS
PATIENT INSTRUCTED TO BE:    - NOTHING TO EAT OR CHEW AFTER MIDNIGHT     -FOLLOW BOWEL PREP AS INSTRUCTED    -DRINK ENSURE PRESURGERY DRINKS AS INSTRUCTED     - TO HOLD ALL VITAMINS, SUPPLEMENTS, NSAIDS FOR ONE WEEK PRIOR TO THEIR SURGICAL PROCEDURE     -INSTRUCTED TO TAKE THE FOLLOWING MEDICATIONS THE DAY OF SURGERY WITH SIPS OF WATER: WELLBUTRIN, FEXOFENADINE, BISOPROLOL    - DO NOT BRING ANY MEDICATIONS WITH YOU TO THE HOSPITAL THE DAY OF SURGERY, EXCEPT IF USE INHALERS. BRING INHALERS DAY OF SURGERY      -MAKE SURE YOU HAVE A RIDE HOME OR SOMEONE TO STAY WITH YOU THE DAY OF THE PROCEDURE AFTER YOU GO HOME     - FOLLOW ANY OTHER INSTRUCTIONS GIVEN TO YOU BY YOUR SURGEON'S OFFICE.     - DAY OF SURGERY __7/18__, [facility]. TAKE ELEVATOR TO FIRST FLOOR, TURN LEFT AND CHECK IN WITH REGISTRATION    - YOU WILL RECEIVE A PHONE CALL THE DAY PRIOR TO SURGERY BETWEEN 1PM AND 4 PM WITH ARRIVAL TIME, IF YOUR SURGERY IS ON A MONDAY YOU WILL RECEIVE A CALL THE FRIDAY PRIOR TO SURGERY DATE    - BRING CASH OR CREDIT CARD FOR COPAYMENT OF MEDICATIONS AFTER SURGERY IF YOU USE THE HOSPITAL PHARMACY (MEDS TO BED)    - PREADMISSION TESTING NURSE CHECO BURKS [phone] IF HAVE ANY QUESTIONS     -PATIENT PROVIDED THE NUMBER FOR PREOP SURGICAL DEPT IF HAD QUESTIONS AFTER HOURS PRIOR TO SURGERY ([phone]).  INFORMED PT IF NO ANSWER, LEAVE A MESSAGE AND SOMEONE WILL RETURN THEIR CALL       PATIENT VERBALIZED UNDERSTANDING Clear Liquid Diet        Find out when you need to start a clear liquid diet.   Think of “clear liquids” as anything you could read a newspaper through. This includes things like water, broth, sports drinks, or tea WITHOUT any kind of milk or cream.           Once you are told to start a clear liquid diet, only drink these things until 2 hours before arrival to the hospital or when the hospital says to stop. Total volume limitation: 8 oz.       Clear liquids you CAN drink:   Water   Clear  broth: beef, chicken, vegetable, or bone broth with nothing in it   Gatorade   Lemonade or Ashok-aid   Soda   Tea, coffee (NO cream or honey)   Jell-O (without fruit)   Popsicles (without fruit or cream)   Italian ices   Juice without pulp: apple, white, grape   You may use salt, pepper, and sugar    Do NOT drink:   Milk or cream   Soy milk, almond milk, coconut milk, or other non-dairy drinks and   creamers   Milkshakes or smoothies   Tomato juice   Orange juice   Grapefruit juice   Cream soups or any other than broth         Clear Liquid Diet:  Do NOT eat any solid food.  Do NOT eat or suck on mints or candy.  Do NOT chew gum.  Do NOT drink thick liquids like milk or juice with pulp in it.  Do NOT add milk, cream, or anything like soy milk or almond milk to coffee or tea.   PREOPERATIVE (BEFORE SURGERY)              BATHING INSTRUCTIONS  Instructions:    You will need to shower 1 2  separate times utilizing the soap provided; at the times indicated   below:     - 7/17 PM   - 7/18 AM      Wash your hair and face with normal shampoo and soap, rinse it well before using the surgical soap.      In the shower, wet the skin completely with water from your neck to your feet. Apply the cleanser to your   body ONLY FROM THE NECK TO YOUR FEET.     Do NOT USE THE CLEANSER ON YOUR FACE, HEAD, OR GENITAL (PRIVATE) AREAS.   Keep it out of your eyes, ears, and mouth because of the risk of injury to those areas.      Scrub with a clean washcloth for each bath utilizing the soap provided from the top of your body to the   bottom starting at the neck area.      Pay close attention to your armpits, groin area, and the site of surgery.      Wash your body gently for 5 minutes. Stand outside the stream or turn off the water while scrubbing your   body. Do NOT wash with your regular soap after the surgical cleanser is used.      RINSE THE CLEANSER OFF COMPLETELY with plenty of water. Rinse the area again thoroughly.      Dry off with a  clean towel. The surgical soap can cause dryness; however do NOT APPLY LOTION,   CREAM, POWDER, and/or DEODORANT AFTER SHOWERING.     Be sure to where clean clothes after showering.      Ensure CLEAN BED LINENS AFTER FIRST wash with the surgical soap.      NO PETS ALLOWED IN THE BED with you after utilizing the surgical soap.

## 2025-07-15 ENCOUNTER — PATIENT OUTREACH (OUTPATIENT)
Dept: CASE MANAGEMENT | Facility: OTHER | Age: 50
End: 2025-07-15
Payer: COMMERCIAL

## 2025-07-15 NOTE — OUTREACH NOTE
AMBULATORY CASE MANAGEMENT NOTE    Names and Relationships of Patient/Support Persons: Contact: Maria Ines Garcia; Relationship: Self -     Patient Outreach    Telephone call with patient. Offered Employee Case Management.  Patient declined at this time. Encouraged to reach out in the future if needed. Patient thanked me for the call.    Education Documentation  No documentation found.        JACQUE JEAN-BAPTISTE  Ambulatory Case Management    7/15/2025, 13:50 EDT

## 2025-07-17 ENCOUNTER — ANESTHESIA EVENT (OUTPATIENT)
Dept: PERIOP | Facility: HOSPITAL | Age: 50
End: 2025-07-17
Payer: COMMERCIAL

## 2025-07-17 ENCOUNTER — TELEPHONE (OUTPATIENT)
Dept: SURGERY | Facility: CLINIC | Age: 50
End: 2025-07-17

## 2025-07-17 RX ORDER — LANOLIN ALCOHOL/MO/W.PET/CERES
1000 CREAM (GRAM) TOPICAL DAILY
Qty: 90 TABLET | Refills: 1 | Status: SHIPPED | OUTPATIENT
Start: 2025-07-17

## 2025-07-18 ENCOUNTER — HOSPITAL ENCOUNTER (INPATIENT)
Facility: HOSPITAL | Age: 50
LOS: 1 days | Discharge: HOME | DRG: 331 | End: 2025-07-19
Attending: SURGERY | Admitting: SURGERY
Payer: COMMERCIAL

## 2025-07-18 ENCOUNTER — ANESTHESIA (OUTPATIENT)
Dept: PERIOP | Facility: HOSPITAL | Age: 50
End: 2025-07-18
Payer: COMMERCIAL

## 2025-07-18 DIAGNOSIS — R26.2 DIFFICULTY IN WALKING: Primary | ICD-10-CM

## 2025-07-18 DIAGNOSIS — K57.32 SIGMOID DIVERTICULITIS: ICD-10-CM

## 2025-07-18 PROBLEM — Z90.49 STATUS POST LAPAROSCOPIC COLECTOMY: Status: ACTIVE | Noted: 2025-07-18

## 2025-07-18 LAB
ABO GROUP BLD: NORMAL
BLD GP AB SCN SERPL QL: NEGATIVE
RH BLD: NEGATIVE
T&S EXPIRATION DATE: NORMAL

## 2025-07-18 PROCEDURE — 44207 L COLECTOMY/COLOPROCTOSTOMY: CPT

## 2025-07-18 PROCEDURE — 25010000002 BUPRENORPHINE PER 0.1 MG: Performed by: SURGERY

## 2025-07-18 PROCEDURE — 25810000003 LACTATED RINGERS PER 1000 ML: Performed by: ANESTHESIOLOGY

## 2025-07-18 PROCEDURE — 25010000002 ONDANSETRON PER 1 MG

## 2025-07-18 PROCEDURE — 25010000002 MORPHINE PER 10 MG: Performed by: SURGERY

## 2025-07-18 PROCEDURE — 25010000002 MIDAZOLAM PER 1MG: Performed by: ANESTHESIOLOGY

## 2025-07-18 PROCEDURE — 25010000002 METRONIDAZOLE 500 MG/100ML SOLUTION: Performed by: SURGERY

## 2025-07-18 PROCEDURE — 86901 BLOOD TYPING SEROLOGIC RH(D): CPT | Performed by: SURGERY

## 2025-07-18 PROCEDURE — 8E0W4CZ ROBOTIC ASSISTED PROCEDURE OF TRUNK REGION, PERCUTANEOUS ENDOSCOPIC APPROACH: ICD-10-PCS | Performed by: SURGERY

## 2025-07-18 PROCEDURE — 25010000002 ONDANSETRON PER 1 MG: Performed by: SURGERY

## 2025-07-18 PROCEDURE — 25010000002 HYDROMORPHONE 1 MG/ML SOLUTION

## 2025-07-18 PROCEDURE — 86900 BLOOD TYPING SEROLOGIC ABO: CPT | Performed by: SURGERY

## 2025-07-18 PROCEDURE — 88307 TISSUE EXAM BY PATHOLOGIST: CPT | Performed by: SURGERY

## 2025-07-18 PROCEDURE — 25010000002 PROPOFOL 10 MG/ML EMULSION

## 2025-07-18 PROCEDURE — 0DTN4ZZ RESECTION OF SIGMOID COLON, PERCUTANEOUS ENDOSCOPIC APPROACH: ICD-10-PCS | Performed by: SURGERY

## 2025-07-18 PROCEDURE — 25010000002 FENTANYL CITRATE (PF) 50 MCG/ML SOLUTION

## 2025-07-18 PROCEDURE — 25010000002 CEFAZOLIN PER 500 MG: Performed by: SURGERY

## 2025-07-18 PROCEDURE — 4A1BXSH MONITORING OF GASTROINTESTINAL VASCULAR PERFUSION USING INDOCYANINE GREEN DYE, EXTERNAL APPROACH: ICD-10-PCS | Performed by: SURGERY

## 2025-07-18 PROCEDURE — 94761 N-INVAS EAR/PLS OXIMETRY MLT: CPT

## 2025-07-18 PROCEDURE — 25010000002 DEXAMETHASONE PER 1 MG

## 2025-07-18 PROCEDURE — 25010000002 DEXAMETHASONE SODIUM PHOSPHATE 100 MG/10ML SOLUTION: Performed by: SURGERY

## 2025-07-18 PROCEDURE — 25810000003 LACTATED RINGERS PER 1000 ML: Performed by: SURGERY

## 2025-07-18 PROCEDURE — 25010000002 METOCLOPRAMIDE PER 10 MG: Performed by: SURGERY

## 2025-07-18 PROCEDURE — 86850 RBC ANTIBODY SCREEN: CPT | Performed by: SURGERY

## 2025-07-18 PROCEDURE — 44207 L COLECTOMY/COLOPROCTOSTOMY: CPT | Performed by: SURGERY

## 2025-07-18 PROCEDURE — 25010000002 KETOROLAC TROMETHAMINE PER 15 MG: Performed by: SURGERY

## 2025-07-18 PROCEDURE — 25010000002 LIDOCAINE PF 2% 2 % SOLUTION

## 2025-07-18 PROCEDURE — 25010000002 BUPIVACAINE (PF) 0.25 % SOLUTION: Performed by: SURGERY

## 2025-07-18 PROCEDURE — 0DJD8ZZ INSPECTION OF LOWER INTESTINAL TRACT, VIA NATURAL OR ARTIFICIAL OPENING ENDOSCOPIC: ICD-10-PCS | Performed by: SURGERY

## 2025-07-18 PROCEDURE — 25010000002 SUGAMMADEX 200 MG/2ML SOLUTION

## 2025-07-18 PROCEDURE — 94799 UNLISTED PULMONARY SVC/PX: CPT

## 2025-07-18 DEVICE — SUREFORM 45 RELOAD BLUE
Type: IMPLANTABLE DEVICE | Site: ABDOMEN | Status: FUNCTIONAL
Brand: SUREFORM

## 2025-07-18 DEVICE — ABSORBABLE WOUND CLOSURE DEVICE
Type: IMPLANTABLE DEVICE | Site: ABDOMEN | Status: FUNCTIONAL
Brand: V-LOC 90

## 2025-07-18 RX ORDER — DEXAMETHASONE SODIUM PHOSPHATE 4 MG/ML
INJECTION, SOLUTION INTRA-ARTICULAR; INTRALESIONAL; INTRAMUSCULAR; INTRAVENOUS; SOFT TISSUE AS NEEDED
Status: DISCONTINUED | OUTPATIENT
Start: 2025-07-18 | End: 2025-07-18 | Stop reason: SURG

## 2025-07-18 RX ORDER — ALVIMOPAN 12 MG/1
12 CAPSULE ORAL ONCE
Status: COMPLETED | OUTPATIENT
Start: 2025-07-18 | End: 2025-07-18

## 2025-07-18 RX ORDER — ONDANSETRON 2 MG/ML
INJECTION INTRAMUSCULAR; INTRAVENOUS AS NEEDED
Status: DISCONTINUED | OUTPATIENT
Start: 2025-07-18 | End: 2025-07-18 | Stop reason: SURG

## 2025-07-18 RX ORDER — METRONIDAZOLE 500 MG/100ML
500 INJECTION, SOLUTION INTRAVENOUS ONCE
Status: COMPLETED | OUTPATIENT
Start: 2025-07-18 | End: 2025-07-18

## 2025-07-18 RX ORDER — OXYCODONE HYDROCHLORIDE 5 MG/1
5 TABLET ORAL
Status: DISCONTINUED | OUTPATIENT
Start: 2025-07-18 | End: 2025-07-18 | Stop reason: HOSPADM

## 2025-07-18 RX ORDER — BISOPROLOL FUMARATE 5 MG/1
5 TABLET, FILM COATED ORAL DAILY
Status: DISCONTINUED | OUTPATIENT
Start: 2025-07-18 | End: 2025-07-19 | Stop reason: HOSPADM

## 2025-07-18 RX ORDER — MORPHINE SULFATE 2 MG/ML
4 INJECTION, SOLUTION INTRAMUSCULAR; INTRAVENOUS
Status: DISCONTINUED | OUTPATIENT
Start: 2025-07-18 | End: 2025-07-18

## 2025-07-18 RX ORDER — FENTANYL CITRATE 50 UG/ML
INJECTION, SOLUTION INTRAMUSCULAR; INTRAVENOUS AS NEEDED
Status: DISCONTINUED | OUTPATIENT
Start: 2025-07-18 | End: 2025-07-18 | Stop reason: SURG

## 2025-07-18 RX ORDER — METRONIDAZOLE 500 MG/100ML
500 INJECTION, SOLUTION INTRAVENOUS EVERY 6 HOURS
Status: COMPLETED | OUTPATIENT
Start: 2025-07-18 | End: 2025-07-18

## 2025-07-18 RX ORDER — ENOXAPARIN SODIUM 100 MG/ML
40 INJECTION SUBCUTANEOUS DAILY
Status: DISCONTINUED | OUTPATIENT
Start: 2025-07-19 | End: 2025-07-19 | Stop reason: HOSPADM

## 2025-07-18 RX ORDER — ROCURONIUM BROMIDE 10 MG/ML
INJECTION, SOLUTION INTRAVENOUS AS NEEDED
Status: DISCONTINUED | OUTPATIENT
Start: 2025-07-18 | End: 2025-07-18 | Stop reason: SURG

## 2025-07-18 RX ORDER — PROMETHAZINE HYDROCHLORIDE 25 MG/1
25 TABLET ORAL ONCE AS NEEDED
Status: DISCONTINUED | OUTPATIENT
Start: 2025-07-18 | End: 2025-07-18 | Stop reason: HOSPADM

## 2025-07-18 RX ORDER — SCOPOLAMINE 1 MG/3D
1 PATCH, EXTENDED RELEASE TRANSDERMAL ONCE
Status: DISCONTINUED | OUTPATIENT
Start: 2025-07-18 | End: 2025-07-18

## 2025-07-18 RX ORDER — SODIUM CHLORIDE, SODIUM LACTATE, POTASSIUM CHLORIDE, CALCIUM CHLORIDE 600; 310; 30; 20 MG/100ML; MG/100ML; MG/100ML; MG/100ML
50 INJECTION, SOLUTION INTRAVENOUS CONTINUOUS
Status: DISCONTINUED | OUTPATIENT
Start: 2025-07-18 | End: 2025-07-18

## 2025-07-18 RX ORDER — ONDANSETRON 2 MG/ML
4 INJECTION INTRAMUSCULAR; INTRAVENOUS EVERY 6 HOURS PRN
Status: DISCONTINUED | OUTPATIENT
Start: 2025-07-18 | End: 2025-07-19 | Stop reason: HOSPADM

## 2025-07-18 RX ORDER — SODIUM CHLORIDE, SODIUM LACTATE, POTASSIUM CHLORIDE, CALCIUM CHLORIDE 600; 310; 30; 20 MG/100ML; MG/100ML; MG/100ML; MG/100ML
75 INJECTION, SOLUTION INTRAVENOUS CONTINUOUS
Status: ACTIVE | OUTPATIENT
Start: 2025-07-18 | End: 2025-07-19

## 2025-07-18 RX ORDER — NALOXONE HCL 0.4 MG/ML
0.4 VIAL (ML) INJECTION
Status: DISCONTINUED | OUTPATIENT
Start: 2025-07-18 | End: 2025-07-19 | Stop reason: HOSPADM

## 2025-07-18 RX ORDER — NALOXONE HCL 0.4 MG/ML
0.4 VIAL (ML) INJECTION
Status: DISCONTINUED | OUTPATIENT
Start: 2025-07-18 | End: 2025-07-18

## 2025-07-18 RX ORDER — METOCLOPRAMIDE HYDROCHLORIDE 5 MG/ML
5 INJECTION INTRAMUSCULAR; INTRAVENOUS EVERY 6 HOURS PRN
Status: COMPLETED | OUTPATIENT
Start: 2025-07-18 | End: 2025-07-18

## 2025-07-18 RX ORDER — SODIUM CHLORIDE 0.9 % (FLUSH) 0.9 %
10 SYRINGE (ML) INJECTION AS NEEDED
Status: DISCONTINUED | OUTPATIENT
Start: 2025-07-18 | End: 2025-07-18 | Stop reason: HOSPADM

## 2025-07-18 RX ORDER — SODIUM CHLORIDE, SODIUM LACTATE, POTASSIUM CHLORIDE, CALCIUM CHLORIDE 600; 310; 30; 20 MG/100ML; MG/100ML; MG/100ML; MG/100ML
9 INJECTION, SOLUTION INTRAVENOUS CONTINUOUS PRN
Status: DISCONTINUED | OUTPATIENT
Start: 2025-07-18 | End: 2025-07-18 | Stop reason: HOSPADM

## 2025-07-18 RX ORDER — PROMETHAZINE HYDROCHLORIDE 25 MG/1
25 SUPPOSITORY RECTAL ONCE AS NEEDED
Status: DISCONTINUED | OUTPATIENT
Start: 2025-07-18 | End: 2025-07-18 | Stop reason: HOSPADM

## 2025-07-18 RX ORDER — ALVIMOPAN 12 MG/1
12 CAPSULE ORAL 2 TIMES DAILY
Status: DISCONTINUED | OUTPATIENT
Start: 2025-07-19 | End: 2025-07-19 | Stop reason: HOSPADM

## 2025-07-18 RX ORDER — MIDAZOLAM HYDROCHLORIDE 2 MG/2ML
2 INJECTION, SOLUTION INTRAMUSCULAR; INTRAVENOUS ONCE
Status: COMPLETED | OUTPATIENT
Start: 2025-07-18 | End: 2025-07-18

## 2025-07-18 RX ORDER — SODIUM CHLORIDE 9 MG/ML
40 INJECTION, SOLUTION INTRAVENOUS AS NEEDED
Status: DISCONTINUED | OUTPATIENT
Start: 2025-07-18 | End: 2025-07-18 | Stop reason: HOSPADM

## 2025-07-18 RX ORDER — DEXMEDETOMIDINE HYDROCHLORIDE 100 UG/ML
INJECTION, SOLUTION INTRAVENOUS AS NEEDED
Status: DISCONTINUED | OUTPATIENT
Start: 2025-07-18 | End: 2025-07-18 | Stop reason: SURG

## 2025-07-18 RX ORDER — BUPROPION HYDROCHLORIDE 150 MG/1
150 TABLET ORAL EVERY MORNING
Status: DISCONTINUED | OUTPATIENT
Start: 2025-07-18 | End: 2025-07-19 | Stop reason: HOSPADM

## 2025-07-18 RX ORDER — PROPOFOL 10 MG/ML
VIAL (ML) INTRAVENOUS AS NEEDED
Status: DISCONTINUED | OUTPATIENT
Start: 2025-07-18 | End: 2025-07-18 | Stop reason: SURG

## 2025-07-18 RX ORDER — POLYETHYLENE GLYCOL 3350 17 G/17G
17 POWDER, FOR SOLUTION ORAL DAILY
Status: DISCONTINUED | OUTPATIENT
Start: 2025-07-18 | End: 2025-07-19 | Stop reason: HOSPADM

## 2025-07-18 RX ORDER — LIDOCAINE HYDROCHLORIDE 20 MG/ML
INJECTION, SOLUTION EPIDURAL; INFILTRATION; INTRACAUDAL; PERINEURAL AS NEEDED
Status: DISCONTINUED | OUTPATIENT
Start: 2025-07-18 | End: 2025-07-18 | Stop reason: SURG

## 2025-07-18 RX ORDER — ONDANSETRON 2 MG/ML
4 INJECTION INTRAMUSCULAR; INTRAVENOUS ONCE AS NEEDED
Status: DISCONTINUED | OUTPATIENT
Start: 2025-07-18 | End: 2025-07-18 | Stop reason: HOSPADM

## 2025-07-18 RX ORDER — MORPHINE SULFATE 2 MG/ML
2 INJECTION, SOLUTION INTRAMUSCULAR; INTRAVENOUS
Status: DISCONTINUED | OUTPATIENT
Start: 2025-07-18 | End: 2025-07-19 | Stop reason: HOSPADM

## 2025-07-18 RX ORDER — KETOROLAC TROMETHAMINE 30 MG/ML
15 INJECTION, SOLUTION INTRAMUSCULAR; INTRAVENOUS EVERY 6 HOURS PRN
Status: DISCONTINUED | OUTPATIENT
Start: 2025-07-18 | End: 2025-07-19 | Stop reason: HOSPADM

## 2025-07-18 RX ORDER — SODIUM CHLORIDE 0.9 % (FLUSH) 0.9 %
10 SYRINGE (ML) INJECTION EVERY 12 HOURS SCHEDULED
Status: DISCONTINUED | OUTPATIENT
Start: 2025-07-18 | End: 2025-07-18 | Stop reason: HOSPADM

## 2025-07-18 RX ORDER — ROSUVASTATIN CALCIUM 5 MG/1
5 TABLET, COATED ORAL DAILY
Status: DISCONTINUED | OUTPATIENT
Start: 2025-07-18 | End: 2025-07-19 | Stop reason: HOSPADM

## 2025-07-18 RX ORDER — ULTRASOUND COUPLING MEDIUM
GEL (GRAM) TOPICAL AS NEEDED
Status: DISCONTINUED | OUTPATIENT
Start: 2025-07-18 | End: 2025-07-18 | Stop reason: HOSPADM

## 2025-07-18 RX ORDER — ACETAMINOPHEN 500 MG
1000 TABLET ORAL ONCE
Status: COMPLETED | OUTPATIENT
Start: 2025-07-18 | End: 2025-07-18

## 2025-07-18 RX ADMIN — ROCURONIUM BROMIDE 20 MG: 10 INJECTION, SOLUTION INTRAVENOUS at 08:41

## 2025-07-18 RX ADMIN — ONDANSETRON 4 MG: 2 INJECTION INTRAMUSCULAR; INTRAVENOUS at 20:54

## 2025-07-18 RX ADMIN — FENTANYL CITRATE 50 MCG: 50 INJECTION, SOLUTION INTRAMUSCULAR; INTRAVENOUS at 08:15

## 2025-07-18 RX ADMIN — METRONIDAZOLE 500 MG: 500 INJECTION, SOLUTION INTRAVENOUS at 21:13

## 2025-07-18 RX ADMIN — SODIUM CHLORIDE, POTASSIUM CHLORIDE, SODIUM LACTATE AND CALCIUM CHLORIDE 9 ML/HR: 600; 310; 30; 20 INJECTION, SOLUTION INTRAVENOUS at 07:03

## 2025-07-18 RX ADMIN — METRONIDAZOLE 500 MG: 500 INJECTION, SOLUTION INTRAVENOUS at 16:02

## 2025-07-18 RX ADMIN — METOCLOPRAMIDE 5 MG: 5 INJECTION, SOLUTION INTRAMUSCULAR; INTRAVENOUS at 22:53

## 2025-07-18 RX ADMIN — METRONIDAZOLE 500 MG: 500 INJECTION, SOLUTION INTRAVENOUS at 07:50

## 2025-07-18 RX ADMIN — ACETAMINOPHEN 1000 MG: 500 TABLET ORAL at 07:14

## 2025-07-18 RX ADMIN — SODIUM CHLORIDE, POTASSIUM CHLORIDE, SODIUM LACTATE AND CALCIUM CHLORIDE 75 ML/HR: 600; 310; 30; 20 INJECTION, SOLUTION INTRAVENOUS at 13:12

## 2025-07-18 RX ADMIN — DEXMEDETOMIDINE 10 MCG: 100 INJECTION, SOLUTION INTRAVENOUS at 10:17

## 2025-07-18 RX ADMIN — ROCURONIUM BROMIDE 60 MG: 10 INJECTION, SOLUTION INTRAVENOUS at 07:44

## 2025-07-18 RX ADMIN — FENTANYL CITRATE 50 MCG: 50 INJECTION, SOLUTION INTRAMUSCULAR; INTRAVENOUS at 10:07

## 2025-07-18 RX ADMIN — ROCURONIUM BROMIDE 20 MG: 10 INJECTION, SOLUTION INTRAVENOUS at 09:25

## 2025-07-18 RX ADMIN — ALVIMOPAN 12 MG: 12 CAPSULE ORAL at 07:14

## 2025-07-18 RX ADMIN — MIDAZOLAM HYDROCHLORIDE 2 MG: 1 INJECTION, SOLUTION INTRAMUSCULAR; INTRAVENOUS at 07:23

## 2025-07-18 RX ADMIN — SODIUM CHLORIDE, POTASSIUM CHLORIDE, SODIUM LACTATE AND CALCIUM CHLORIDE: 600; 310; 30; 20 INJECTION, SOLUTION INTRAVENOUS at 09:01

## 2025-07-18 RX ADMIN — ONDANSETRON 4 MG: 2 INJECTION INTRAMUSCULAR; INTRAVENOUS at 10:13

## 2025-07-18 RX ADMIN — DEXMEDETOMIDINE 10 MCG: 100 INJECTION, SOLUTION INTRAVENOUS at 08:18

## 2025-07-18 RX ADMIN — ONDANSETRON 4 MG: 2 INJECTION INTRAMUSCULAR; INTRAVENOUS at 11:41

## 2025-07-18 RX ADMIN — HYDROMORPHONE HYDROCHLORIDE 0.5 MG: 1 INJECTION, SOLUTION INTRAMUSCULAR; INTRAVENOUS; SUBCUTANEOUS at 10:13

## 2025-07-18 RX ADMIN — SCOPOLAMINE 1 PATCH: 1.5 PATCH, EXTENDED RELEASE TRANSDERMAL at 07:15

## 2025-07-18 RX ADMIN — SODIUM CHLORIDE 2 G: 9 INJECTION, SOLUTION INTRAVENOUS at 08:00

## 2025-07-18 RX ADMIN — MORPHINE SULFATE 4 MG: 2 INJECTION, SOLUTION INTRAMUSCULAR; INTRAVENOUS at 16:02

## 2025-07-18 RX ADMIN — CEFAZOLIN 2000 MG: 2 INJECTION, POWDER, FOR SOLUTION INTRAMUSCULAR; INTRAVENOUS at 16:01

## 2025-07-18 RX ADMIN — LIDOCAINE HYDROCHLORIDE 80 MG: 20 INJECTION, SOLUTION INTRAVENOUS at 07:44

## 2025-07-18 RX ADMIN — KETOROLAC TROMETHAMINE 15 MG: 30 INJECTION, SOLUTION INTRAMUSCULAR; INTRAVENOUS at 22:48

## 2025-07-18 RX ADMIN — PROPOFOL 150 MG: 10 INJECTION, EMULSION INTRAVENOUS at 07:44

## 2025-07-18 RX ADMIN — FENTANYL CITRATE 50 MCG: 50 INJECTION, SOLUTION INTRAMUSCULAR; INTRAVENOUS at 07:43

## 2025-07-18 RX ADMIN — DEXAMETHASONE SODIUM PHOSPHATE 4 MG: 4 INJECTION, SOLUTION INTRAMUSCULAR; INTRAVENOUS at 07:48

## 2025-07-18 RX ADMIN — FENTANYL CITRATE 50 MCG: 50 INJECTION, SOLUTION INTRAMUSCULAR; INTRAVENOUS at 09:05

## 2025-07-18 RX ADMIN — SUGAMMADEX 200 MG: 100 INJECTION, SOLUTION INTRAVENOUS at 10:21

## 2025-07-18 RX ADMIN — METOCLOPRAMIDE 5 MG: 5 INJECTION, SOLUTION INTRAMUSCULAR; INTRAVENOUS at 16:02

## 2025-07-18 NOTE — OP NOTE
OP NOTE  COLON RESECTION LAPAROSCOPIC SIGMOID WITH DAVINCI ROBOT  Procedure Report    Patient Name:  Maria Ines Garcia  YOB: 1975  [id number]    Date of Surgery:  7/18/2025     Indications: See last clinic note for indications, discussion of risk benefits alternative    Pre-op Diagnosis:   Sigmoid diverticulitis [K57.32]       Post-Op Diagnosis Codes:     * Sigmoid diverticulitis [K57.32]    Procedure:  Robotic sigmoid colectomy with descending colon to rectal anastomosis    Staff:  Surgeon(s):  Long Flowers MD    Assistant: Colette Helms CSA    Anesthesia: General, Local    Estimated Blood Loss: 200 mL    Implants:    Implant Name Type Inv. Item Serial No.  Lot No. LRB No. Used Action   DEV CLS WND VLOC/90 ETELVINA ABS 1/2CIR SZ3/0 26MM 23CM YUSEF - [id number] Implant DEV CLS WND VLOC/90 ETELVINA ABS 1/2CIR SZ3/0 26MM 23CM YUSEF  COVIDIEN [id number] N/A 4 Implanted   RELOAD STPLR TISS SUREFORM/45 DAVINCI/X/XI 6ROW 3.5 DEVAN 1P/U - [id number] Implant RELOAD STPLR TISS SUREFORM/45 DAVINCI/X/XI 6ROW 3.5 DEVAN 1P/U  INTUITIVE SURGICAL [id number] N/A 4 Implanted       Specimen:          Specimens       ID Source Type Tests Collected By Collected At Frozen?    A Large Intestine, Sigmoid Colon Tissue TISSUE PATHOLOGY EXAM   Long Flowers MD 7/18/25 0900     Description: SIGMOID WITH PORTION OF RECTUM              Findings: Inflamed sigmoid consistent with diverticulitis.  Robotic sigmoid colectomy with descending colon to rectal end to end 2 layer robotic sewn anastomosis.  Negative air leak test.  Perfusion noted to both ends of the bowel with ICG imaging prior to anastomosis    Complications: None    Description of Procedure:   After all questions were answered, consent was verified.  Patient brought to the operating room per stretcher placed in supine position arms out all extremities padded.  Bilateral lower extremity SCDs placed.  Anesthesia induced.  Preoperative IV antibiotics  administered.  Bilateral upper extremities padded and tucked.  Patient placed in lithotomy.  Mustafa catheter placed.  Patient's abdomen prepped with ChloraPrep.  Patient's perianal area prepped with Betadine.  We waited 3 minutes.  Draped in usual sterile fashion.  Ioban applied.  Critical timeout taken.  Began the procedure with a stab incision in Osorio's point.  I placed a Veress needle.  Positive drop test.  I obtain pneumoperitoneum with CO2 insufflation.  I made a transverse incision right mid lateral abdomen placing robotic a trocar and Optiview fashion.  No injury to viscera below from entry into the abdomen.  Veress needle removed.  I then placed 2 additional robotic 8 trocars upper midline and left upper quadrant under direct vision.  I infiltrated with local anesthesia bilateral upper quadrant in a tap block fashion.  I placed a 5 trocar in the right upper quadrant.  I placed a robotic 12 trocar in the right lower quadrant.  We placed the patient in Trendelenburg and rotated to the right.  I swept the small bowel out of the pelvis.  We docked the robot and placed instruments.  The sigmoid colon abnormal consistent with chronic diverticulitis.  I then began mobilizing the sigmoid colon lateral to medial with scissor electrocautery.  Left ureter seen and preserved.  I then made an opening under the lumen of the proximal rectum with the vessel sealer and divided this with robotic MELINA stapler blue load x 2.  I then made an opening under the lumen of the descending colon at its junction with the sigmoid colon with the vessel sealer and divided this with robotic MELINA stapler blue load x 2.  I then divided under the lumen of the bowel dividing the mesentery with the LigaSure.  Division hemostatic.  I placed the specimen in the right upper quadrant.  I then further mobilized the descending colon lateral to medial to allow to reach the rectum without tension.  Anesthesia administered ICG with perfusion noted to both  ends of the bowel.  I then performed a robotic sewn 2 layer anastomosis between the descending colon and rectum with silk and V-Loc sutures.  The anastomosis was patent and under no tension.  We removed needles and obtained a correct count.  We removed Raytek I irrigated and suctioned out the pelvis.  We then removed instruments and undocked the robot.  I scrubbed back in.  I entered the abdomen through a Pfannenstiel incision using muscle-splitting technique.  I placed a wound protector.  The specimen was brought out through the wound protector and sent to pathology for permanent.  We reobtain pneumoperitoneum.  I then clamped the bowel proximal to the anastomosis with a bowel grasper and submerged the anastomosis with sterile water.  Negative air leak test with rigid proctoscopy.  We then suctioned out the pelvis.  I closed the 12 trocar fascia site with Jian Ryan device Vicryl suture.  We desufflated the abdomen remove the trocars removed the specimen bag.  I closed the Pfannenstiel incision reapproximated the rectus with interrupted Vicryl.  The fascia was closed with PDS.  We closed the incisions with staples leaving openings to the lateral portion of the Fenistil packed with Nu Gauze to allow drainage.  Dressings placed.  At the end of the procedure all counts were correct.  I was present for the procedure.    Assistant: Colette Helms CSA was responsible for performing the following activities: Retraction, Closing, Placing Dressing, and docking, undocking robot, leak test and their skilled assistance was necessary for the success of this case.    Long Flowers MD     Date: 7/18/2025  Time: 10:44 EDT

## 2025-07-18 NOTE — PLAN OF CARE
Goal Outcome Evaluation:           Progress: no change  Outcome Evaluation: Patient pain being controlled at this time with PRN medications, N/V medication given with success, patient is resting at this time with bed in low position, call light within reach.

## 2025-07-18 NOTE — ANESTHESIA PREPROCEDURE EVALUATION
Anesthesia Evaluation     Patient summary reviewed and Nursing notes reviewed   no history of anesthetic complications:   NPO Solid Status: > 8 hours  NPO Liquid Status: > 2 hours           Airway   Mallampati: II  TM distance: <3 FB  Neck ROM: full  No difficulty expected  Dental    (+) poor dentition and upper dentures        Pulmonary - normal exam    breath sounds clear to auscultation  (+) a smoker Former,  Cardiovascular - normal exam  Exercise tolerance: good (4-7 METS)    ECG reviewed  Rhythm: regular  Rate: normal    (+) hypertension, hyperlipidemia      Neuro/Psych- negative ROS  GI/Hepatic/Renal/Endo - negative ROS     ROS Comment: Diverticulitis      Musculoskeletal (-) negative ROS    Abdominal    Substance History - negative use     OB/GYN negative ob/gyn ROS         Other - negative ROS       ROS/Med Hx Other: HX ATYPICAL CP 2012 ,HTN, HLD, DIVERTICULOSIS. METS>4. LAST CARDIAC NOTE 9/28/25 DR. CHAVEZ W/ F/U 1 YEAR. NO CP/SOA. ELM     EKG 3/20/2018- SR    ECHO 2018- EF 65.5%, trace to mild MR    Stress 2018- normal              Anesthesia Plan    ASA 2     general     (Patient understands anesthesia not responsible for dental damage.)  intravenous induction     Anesthetic plan, risks, benefits, and alternatives have been provided, discussed and informed consent has been obtained with: patient.    Use of blood products discussed with patient .    Plan discussed with CRNA.    CODE STATUS:

## 2025-07-18 NOTE — H&P
No changes    History of present illness:    History of multiple episodes of diverticulitis since 2024.  She has been on multiple rounds of antibiotics.  No history of hospitalization or abscess.  CT abdomen and pelvis 2025 with sigmoid diverticulitis.  No previous abdominal surgery.  No blood thinner use.  No tobacco use.  No chest pain.  Colonoscopy by Dr. Rosa May 2025 with diverticulosis and polyps removed.        History:  Medical History        Past Medical History:   Diagnosis Date    Abnormal Pap smear of cervix 2024     LGSIL    Allergic      Anxiety      COVID-19 2023    Diverticulitis of colon     Diverticulosis      GI (gastrointestinal bleed)      Heavy periods      History of hepatitis B     Hyperlipidemia      Hypertension      Infectious viral hepatitis      Hep B    Rosacea      Seasonal allergies      Uterine fibroid      Varicella              Surgical History         Past Surgical History:   Procedure Laterality Date     SECTION       COLONOSCOPY        COLONOSCOPY N/A 2025     Procedure: COLONOSCOPY with cold snare polypectomy, biopsies;  Surgeon: Jose Rosa MD;  Location: 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    D & C HYSTEROSCOPY N/A 2019     Procedure: AND MYOSURE;  Surgeon: Mukund Castro MD;  [facility];  Service: Obstetrics/Gynecology    D & C HYSTEROSCOPY ENDOMETRIAL ABLATION N/A 2019     Procedure: DILATATION AND CURETTAGE HYSTEROSCOPY WITH NOVASURE ENDOMETRIAL ABLATION;  Surgeon: Mukund Castro MD;  Location[facility];  Service: Obstetrics/Gynecology    DILATATION AND CURETTAGE         MAB    TONSILLECTOMY AND ADENOIDECTOMY       TUBAL  ABDOMINAL LIGATION Bilateral                   Family History   Problem Relation Age of Onset    Emphysema Father      COPD Father      Heart disease Mother      Hypertension Mother      Diabetes Mother      Coronary artery disease Mother      Arrhythmia Maternal Grandmother      Heart disease Paternal Uncle      Malig Hyperthermia Neg Hx      Breast cancer Neg Hx      Ovarian cancer Neg Hx      Uterine cancer Neg Hx      Colon cancer Neg Hx           Social History   Social History            Tobacco Use    Smoking status: Former       Current packs/day: 0.00       Average packs/day: 1.5 packs/day for 19.4 years (29.1 ttl pk-yrs)       Types: Cigarettes       Start date: 8/3/1989       Quit date: 2009       Years since quittin.4       Passive exposure: Never    Smokeless tobacco: Never   Vaping Use    Vaping status: Never Used   Substance Use Topics    Alcohol use: Not Currently       Comment: once per week or less    Drug use: Never            Review of Systems  All systems were reviewed and negative except for:   Review of Systems   Constitutional: Negative for chills, fever and unexpected weight loss.   HENT: Negative for congestion, nosebleeds and voice change.    Eyes: Negative for blurred vision, double vision and discharge.   Respiratory: Negative for apnea, chest tightness and shortness of breath.    Cardiovascular: Negative for chest pain and leg swelling.   Gastrointestinal:        See HPI   Endocrine: Negative for cold intolerance and heat intolerance.   Genitourinary: Negative for dysuria, hematuria and urgency.   Musculoskeletal: Negative for back pain, joint swelling and neck pain.   Skin: Negative for color change and dry skin.   Neurological: Negative for dizziness and confusion.   Hematological: Negative for adenopathy.   Psychiatric/Behavioral: Negative for agitation and behavioral problems.      MEDS:          Prior to Admission medications    Medication Sig Start Date End Date  Taking? Authorizing Provider   bisoprolol (ZEBeta) 5 MG tablet Take 1 tablet by mouth Daily. 12/6/24   Yes Mae Espino MD   buPROPion XL (WELLBUTRIN XL) 150 MG 24 hr tablet TAKE 1 TABLET BY MOUTH EVERY MORNING 3/4/25 3/4/26 Yes Lennox Tran DO   ciprofloxacin (Cipro) 500 MG tablet Take 1 tablet by mouth 2 (Two) Times a Day for 14 days. 5/27/25 6/19/25 Yes Lennox Tran DO   Fexofenadine HCl (ALLERGY 24-HR PO) Take 1 tablet by mouth Every Morning.     Yes Provider, MD Stefani   metroNIDAZOLE (FLAGYL) 500 MG tablet Take 1 tablet by mouth 3 (Three) Times a Day for 14 days. 5/27/25 6/19/25 Yes Lennox Tran DO   rosuvastatin (CRESTOR) 5 MG tablet Take 1 tablet by mouth Daily. 3/4/25   Yes Lennox Tran DO   vitamin B-12 (CYANOCOBALAMIN) 1000 MCG tablet Take 1 tablet by mouth Daily. 3/4/25   Yes Lennox Tran DO   vitamin D (ERGOCALCIFEROL) 1.25 MG (12284 UT) capsule capsule Take 1 capsule by mouth 1 (One) Time Per Week. 12/6/24   Yes Lennox Tran DO   docusate sodium (Colace) 100 MG capsule Take 1 capsule by mouth 2 (Two) Times a Day. 3/11/25     Darline Boland APRN         Allergies:  Patient has no known allergies.     Objective  Vital Signs   BP: (135)/(83) 135/83     Physical Exam:                General Appearance:    Alert, cooperative, in no acute distress   Head:    Normocephalic, without obvious abnormality, atraumatic   Eyes:          Conjunctivae and sclerae normal, no icterus,      Ears:    Ears appear intact with no abnormalities noted   Throat:   No oral lesions, no thrush, oral mucosa moist   Neck:   No adenopathy, supple, trachea midline, no thyromegaly   Back:     No kyphosis present, no scoliosis present, no skin lesions,      erythema or scars, no tenderness to percussion or                   palpation,   range of motion normal   Lungs:     Clear to auscultation,respirations regular, even and                  unlabored    Heart:    Regular  "rhythm and normal rate, normal S1 and S2, no            murmur, no gallop, no rub, no click   Chest Wall:    No abnormalities observed   Abdomen:     Normal bowel sounds, no masses, no organomegaly, soft        non-tender, non-distended, no guarding, no rebound                tenderness   Rectal:        Extremities:   Moves all extremities well, no edema, no cyanosis, no             redness   Pulses:   Pulses palpable and equal bilaterally   Skin:   No bleeding, bruising or rash   Lymph nodes:   No palpable adenopathy   Neurologic:   A/o x 4 with no deficits         Results Review:              {Results Review:[id number]::\"I reviewed the patient's new clinical results.\"     LABS/IMAGING:        Results for orders placed or performed in visit on 05/22/25   [facility] Vaginosis Panel - Swab, Vagina     Collection Time: 05/22/25  4:54 PM     Specimen: Vagina; Swab   Result Value Ref Range     Bacterial vaginosis Negative Negative     Candida glabrata/krusei Detected (A) Not Detected     CANDIDA GROUP Not Detected Not Detected     Trichomonas vaginalis Not Detected Not Detected         Result Review  : Labs  Result Review  Imaging  Med Tab  Media      Assessment & Plan  Multiple episodes of sigmoid diverticulitis     robotic possible open sigmoid colectomy.  Procedure and recovery discussed.  Benefits and alternatives discussed.  Risk procedure including risk of anesthesia, bleeding, infection, conversion open, damage to surrounding structures including the ureter, heart attack, stroke, blood clot, pneumonia, hernia, anastomotic leak discussed.  All questions answered.she agrees with plan   "

## 2025-07-18 NOTE — NURSING NOTE
CAROL ANN, Eun Garcia RN, completed a skin assessment upon admission of the patient Maria Ines Garcia, with Jennifer ABBOTT. Skin WNL with the exception of 6 surgical sites on her abdomen, all dressings dry and intact. Her bottom was red but blanchable.

## 2025-07-18 NOTE — ANESTHESIA POSTPROCEDURE EVALUATION
Patient: Maria Ines Garcia    Procedure Summary       Date: 07/18/25 Room / Location: [facility] OR  / [facility] MAIN OR    Anesthesia Start: 0739 Anesthesia Stop: 1034    Procedure: COLON RESECTION LAPAROSCOPIC SIGMOID WITH DAVINCI ROBOT, remove sigmoid colon (Abdomen) Diagnosis:       Sigmoid diverticulitis      (Sigmoid diverticulitis [K57.32])    Surgeons: Long Flowers MD Provider: Zak Loja MD    Anesthesia Type: general ASA Status: 2            Anesthesia Type: general    Vitals  Vitals Value Taken Time   /65 07/18/25 11:38   Temp 36.2 °C (97.1 °F) 07/18/25 11:15   Pulse 84 07/18/25 11:37   Resp 12 07/18/25 11:35   SpO2 94 % 07/18/25 11:37   Vitals shown include unfiled device data.        Post Anesthesia Care and Evaluation    Patient location during evaluation: bedside  Patient participation: complete - patient participated  Level of consciousness: awake  Pain management: adequate    Airway patency: patent  PONV Status: none  Cardiovascular status: acceptable and stable  Respiratory status: acceptable  Hydration status: acceptable

## 2025-07-19 VITALS
RESPIRATION RATE: 18 BRPM | TEMPERATURE: 98.1 F | BODY MASS INDEX: 28.38 KG/M2 | HEIGHT: 64 IN | WEIGHT: 166.23 LBS | OXYGEN SATURATION: 98 % | DIASTOLIC BLOOD PRESSURE: 74 MMHG | HEART RATE: 70 BPM | SYSTOLIC BLOOD PRESSURE: 117 MMHG

## 2025-07-19 LAB
ANION GAP SERPL CALCULATED.3IONS-SCNC: 9.7 MMOL/L (ref 5–15)
BASOPHILS # BLD AUTO: 0.01 10*3/MM3 (ref 0–0.2)
BASOPHILS NFR BLD AUTO: 0.1 % (ref 0–1.5)
BUN SERPL-MCNC: 7.4 MG/DL (ref 6–20)
BUN/CREAT SERPL: 10.3 (ref 7–25)
CALCIUM SPEC-SCNC: 8.8 MG/DL (ref 8.6–10.5)
CHLORIDE SERPL-SCNC: 105 MMOL/L (ref 98–107)
CO2 SERPL-SCNC: 25.3 MMOL/L (ref 22–29)
CREAT SERPL-MCNC: 0.72 MG/DL (ref 0.57–1)
DEPRECATED RDW RBC AUTO: 41.1 FL (ref 37–54)
EGFRCR SERPLBLD CKD-EPI 2021: 101.4 ML/MIN/1.73
EOSINOPHIL # BLD AUTO: 0 10*3/MM3 (ref 0–0.4)
EOSINOPHIL NFR BLD AUTO: 0 % (ref 0.3–6.2)
ERYTHROCYTE [DISTWIDTH] IN BLOOD BY AUTOMATED COUNT: 13.2 % (ref 12.3–15.4)
GLUCOSE SERPL-MCNC: 117 MG/DL (ref 65–99)
HCT VFR BLD AUTO: 34.7 % (ref 34–46.6)
HGB BLD-MCNC: 11.4 G/DL (ref 12–15.9)
IMM GRANULOCYTES # BLD AUTO: 0.04 10*3/MM3 (ref 0–0.05)
IMM GRANULOCYTES NFR BLD AUTO: 0.3 % (ref 0–0.5)
LYMPHOCYTES # BLD AUTO: 0.97 10*3/MM3 (ref 0.7–3.1)
LYMPHOCYTES NFR BLD AUTO: 7.4 % (ref 19.6–45.3)
MCH RBC QN AUTO: 28.4 PG (ref 26.6–33)
MCHC RBC AUTO-ENTMCNC: 32.9 G/DL (ref 31.5–35.7)
MCV RBC AUTO: 86.3 FL (ref 79–97)
MONOCYTES # BLD AUTO: 0.85 10*3/MM3 (ref 0.1–0.9)
MONOCYTES NFR BLD AUTO: 6.5 % (ref 5–12)
NEUTROPHILS NFR BLD AUTO: 11.25 10*3/MM3 (ref 1.7–7)
NEUTROPHILS NFR BLD AUTO: 85.7 % (ref 42.7–76)
NRBC BLD AUTO-RTO: 0 /100 WBC (ref 0–0.2)
PLATELET # BLD AUTO: 202 10*3/MM3 (ref 140–450)
PMV BLD AUTO: 11.5 FL (ref 6–12)
POTASSIUM SERPL-SCNC: 3.6 MMOL/L (ref 3.5–5.2)
RBC # BLD AUTO: 4.02 10*6/MM3 (ref 3.77–5.28)
SODIUM SERPL-SCNC: 140 MMOL/L (ref 136–145)
WBC NRBC COR # BLD AUTO: 13.12 10*3/MM3 (ref 3.4–10.8)

## 2025-07-19 PROCEDURE — 25010000002 ENOXAPARIN PER 10 MG: Performed by: SURGERY

## 2025-07-19 PROCEDURE — 25010000002 CEFAZOLIN PER 500 MG: Performed by: SURGERY

## 2025-07-19 PROCEDURE — 85025 COMPLETE CBC W/AUTO DIFF WBC: CPT | Performed by: SURGERY

## 2025-07-19 PROCEDURE — 25810000003 LACTATED RINGERS PER 1000 ML: Performed by: SURGERY

## 2025-07-19 PROCEDURE — 97161 PT EVAL LOW COMPLEX 20 MIN: CPT

## 2025-07-19 PROCEDURE — 94761 N-INVAS EAR/PLS OXIMETRY MLT: CPT

## 2025-07-19 PROCEDURE — 94799 UNLISTED PULMONARY SVC/PX: CPT

## 2025-07-19 PROCEDURE — 80048 BASIC METABOLIC PNL TOTAL CA: CPT | Performed by: SURGERY

## 2025-07-19 RX ORDER — HYDROCODONE BITARTRATE AND ACETAMINOPHEN 5; 325 MG/1; MG/1
1 TABLET ORAL EVERY 6 HOURS PRN
Qty: 12 TABLET | Refills: 0 | Status: SHIPPED | OUTPATIENT
Start: 2025-07-19

## 2025-07-19 RX ORDER — POLYETHYLENE GLYCOL 3350 17 G/17G
17 POWDER, FOR SOLUTION ORAL DAILY
Qty: 5 PACKET | Refills: 0 | Status: SHIPPED | OUTPATIENT
Start: 2025-07-19

## 2025-07-19 RX ADMIN — CEFAZOLIN 2000 MG: 2 INJECTION, POWDER, FOR SOLUTION INTRAMUSCULAR; INTRAVENOUS at 00:54

## 2025-07-19 RX ADMIN — POLYETHYLENE GLYCOL 3350 17 G: 17 POWDER, FOR SOLUTION ORAL at 08:09

## 2025-07-19 RX ADMIN — BISOPROLOL FUMARATE 5 MG: 5 TABLET ORAL at 08:09

## 2025-07-19 RX ADMIN — ENOXAPARIN SODIUM 40 MG: 100 INJECTION SUBCUTANEOUS at 08:09

## 2025-07-19 RX ADMIN — SODIUM CHLORIDE, POTASSIUM CHLORIDE, SODIUM LACTATE AND CALCIUM CHLORIDE 75 ML/HR: 600; 310; 30; 20 INJECTION, SOLUTION INTRAVENOUS at 05:50

## 2025-07-19 RX ADMIN — BUPROPION HYDROCHLORIDE 150 MG: 150 TABLET, EXTENDED RELEASE ORAL at 08:10

## 2025-07-19 RX ADMIN — ROSUVASTATIN CALCIUM 5 MG: 5 TABLET, FILM COATED ORAL at 08:09

## 2025-07-19 NOTE — PLAN OF CARE
Goal Outcome Evaluation:  Plan of Care Reviewed With: patient        Progress: improving  Outcome Evaluation: Pt presents with no functional limitations at this time.  She is independent with transfers and ambulating without AD.  Will DC PT at this time.    Anticipated Discharge Disposition (PT): home

## 2025-07-19 NOTE — PROGRESS NOTES
SURGERY PROGRESS NOTE     Patient Name:  Maria Ines Garcia  YOB: 1975  [id number]   LOS: 1 day   1 Day Post-Op  Patient Care Team:  Lennox Tran DO as PCP - General (Family Medicine)      Subjective     Interval History: Afebrile, signs stable.  WBC 13, creatinine 0.7.  Pain control.  Mustafa out.  Tolerating diet without nausea.  Positive flatus.      Objective     Vital Signs  Temp:  [97.1 °F (36.2 °C)-98.1 °F (36.7 °C)] 98.1 °F (36.7 °C)  Heart Rate:  [] 79  Resp:  [12-18] 16  BP: ()/(56-84) 110/56    Physical Exam: Incisions without evidence of infection abdomen soft, appropriately tender, no hernia, packing removed from lower abdominal incision         Results Review:     I reviewed the patient's new clinical results.    LABS:  Lab Results (last 72 hours)       Procedure Component Value Units Date/Time    Basic Metabolic Panel [[id number]]  (Abnormal) Collected: 07/19/25 0455    Specimen: Blood from Arm, Right Updated: 07/19/25 0529     Glucose 117 mg/dL      BUN 7.4 mg/dL      Creatinine 0.72 mg/dL      Sodium 140 mmol/L      Potassium 3.6 mmol/L      Chloride 105 mmol/L      CO2 25.3 mmol/L      Calcium 8.8 mg/dL      BUN/Creatinine Ratio 10.3     Anion Gap 9.7 mmol/L      eGFR 101.4 mL/min/1.73     Narrative:      GFR Categories in Chronic Kidney Disease (CKD)              GFR Category          GFR (mL/min/1.73)    Interpretation  G1                    90 or greater        Normal or high (1)  G2                    60-89                Mild decrease (1)  G3a                   45-59                Mild to moderate decrease  G3b                   30-44                Moderate to severe decrease  G4                    15-29                Severe decrease  G5                    14 or less           Kidney failure    (1)In the absence of evidence of kidney disease, neither GFR category G1 or G2 fulfill the criteria for CKD.    eGFR calculation 2021 CKD-EPI creatinine equation, which does  not include race as a factor    CBC & Differential [[id number]]  (Abnormal) Collected: 07/19/25 0455    Specimen: Blood from Arm, Right Updated: 07/19/25 0511    Narrative:      The following orders were created for panel order CBC & Differential.  Procedure                               Abnormality         Status                     ---------                               -----------         ------                     CBC Auto Differential[[id number]]        Abnormal            Final result                 Please view results for these tests on the individual orders.    CBC Auto Differential [[id number]]  (Abnormal) Collected: 07/19/25 0455    Specimen: Blood from Arm, Right Updated: 07/19/25 0511     WBC 13.12 10*3/mm3      RBC 4.02 10*6/mm3      Hemoglobin 11.4 g/dL      Hematocrit 34.7 %      MCV 86.3 fL      MCH 28.4 pg      MCHC 32.9 g/dL      RDW 13.2 %      RDW-SD 41.1 fl      MPV 11.5 fL      Platelets 202 10*3/mm3      Neutrophil % 85.7 %      Lymphocyte % 7.4 %      Monocyte % 6.5 %      Eosinophil % 0.0 %      Basophil % 0.1 %      Immature Grans % 0.3 %      Neutrophils, Absolute 11.25 10*3/mm3      Lymphocytes, Absolute 0.97 10*3/mm3      Monocytes, Absolute 0.85 10*3/mm3      Eosinophils, Absolute 0.00 10*3/mm3      Basophils, Absolute 0.01 10*3/mm3      Immature Grans, Absolute 0.04 10*3/mm3      nRBC 0.0 /100 WBC             IMAGING:  Imaging Results (Last 72 Hours)       ** No results found for the last 72 hours. **            Medications:    Current Facility-Administered Medications:     alvimopan (ENTEREG) capsule 12 mg, 12 mg, Oral, BID, Long Flowers MD    bisoprolol (ZEBeta) tablet 5 mg, 5 mg, Oral, Daily, Long Flowers MD, 5 mg at 07/19/25 0809    buPROPion XL (WELLBUTRIN XL) 24 hr tablet 150 mg, 150 mg, Oral, QAMKristie Matthew Bruce, MD, 150 mg at 07/19/25 0810    enoxaparin sodium (LOVENOX) syringe 40 mg, 40 mg, Subcutaneous, Daily, Long Flowers MD, 40 mg at  07/19/25 0809    ketorolac (TORADOL) injection 15 mg, 15 mg, Intravenous, Q6H PRN, Long Flowers MD, 15 mg at 07/18/25 2248    lactated ringers infusion, 75 mL/hr, Intravenous, Continuous, Long Flowers MD, Last Rate: 75 mL/hr at 07/19/25 0550, 75 mL/hr at 07/19/25 0550    morphine injection 2 mg, 2 mg, Intravenous, Q2H PRN **AND** naloxone (NARCAN) injection 0.4 mg, 0.4 mg, Intravenous, Q5 Min PRN, Long Flowers MD    ondansetron (ZOFRAN) injection 4 mg, 4 mg, Intravenous, Q6H PRN, Long Flowers MD, 4 mg at 07/18/25 2054    polyethylene glycol (MIRALAX) packet 17 g, 17 g, Oral, Daily, Long Flowers MD, 17 g at 07/19/25 0809    rosuvastatin (CRESTOR) tablet 5 mg, 5 mg, Oral, Daily, Long Flowers MD, 5 mg at 07/19/25 0809    Assessment & Plan       Sigmoid diverticulitis    Status post laparoscopic colectomy    Status post robotic sigmoid colectomy    Labs reviewed.  Discharge home.  Discharge instructions given.  Follow-up with Dr. Flowers 2 weeks from the time of surgery.  No packing of incision needed.  Expect some drainage.  Call for fever, worsening redness.  May shower starting tomorrow with her bandages removed.  No lifting greater than 10 pounds for 6 weeks from the time of surgery.  Diet as tolerated.  No working or driving on pain medication.  All questions answered.  She agrees with the plan         Long Flowers MD  07/19/25  10:24 EDT

## 2025-07-19 NOTE — DISCHARGE SUMMARY
Patient Name:  Maria Ines Garcia  YOB: 1975  [id number]    Date of Discharge:  7/19/2025    Discharge Diagnosis:   History of sigmoid diverticulitis    Problem List:    Sigmoid diverticulitis    Status post laparoscopic colectomy      Presenting Problem/History of Present Illness  Sigmoid diverticulitis [K57.32]  Status post laparoscopic colectomy [Z90.49]    Hospital Course  Patient is a 49 y.o. female presented with history of sigmoid diverticulitis undergoing the procedure below.  Admitted for pain control, return of bowel function, hydration after surgery.  She did well with return of bowel function and tolerating diet.  Discharged home.    Procedures Performed  Procedure(s):  COLON RESECTION LAPAROSCOPIC SIGMOID WITH DAVINCI ROBOT, remove sigmoid colon       Consults:   Consults       No orders found for last 30 day(s).          Condition on Discharge: Stable    Vital Signs  Temp:  [97.9 °F (36.6 °C)-98.1 °F (36.7 °C)] 98.1 °F (36.7 °C)  Heart Rate:  [] 70  Resp:  [16-18] 18  BP: (109-118)/(56-78) 117/74    Discharge Disposition  Home or Self Care    Discharge Medications     Discharge Medications        New Medications        Instructions Start Date   HYDROcodone-acetaminophen 5-325 MG per tablet  Commonly known as: NORCO   1 tablet, Oral, Every 6 Hours PRN      polyethylene glycol 17 g packet  Commonly known as: MIRALAX   17 g, Oral, Daily             Continue These Medications        Instructions Start Date   bisoprolol 5 MG tablet  Commonly known as: ZEBeta   5 mg, Oral, Daily      buPROPion  MG 24 hr tablet  Commonly known as: WELLBUTRIN XL   TAKE 1 TABLET BY MOUTH EVERY MORNING      CLARITIN PO   1 tablet, Oral, Daily      rosuvastatin 5 MG tablet  Commonly known as: CRESTOR   5 mg, Oral, Daily      vitamin B-12 1000 MCG tablet  Commonly known as: CYANOCOBALAMIN   1,000 mcg, Oral, Daily      vitamin D 1.25 MG (73249 UT) capsule capsule  Commonly known as: ERGOCALCIFEROL   50,000  Units, Oral, Weekly               Discharge Diet       Activity at Discharge  Activity Instructions       Activity as Tolerated      Ambulates starting today.    Driving Restrictions      No driving or working on pain medication    Type of Restriction: Other    Explain Other Restrictions: No driving or working on pain medication    Lifting Restrictions      No lifting greater than 5 to 10 pounds for 6 weeks    Type of Restriction: Lifting    Lifting Restrictions: Lifting Restriction (Indicate Limit)    Weight Limit (Pounds): 10    Length of Lifting Restriction: No lifting greater than 5 to 10 pounds for 6 weeks    Work Restrictions      No working while on pain medication    Type of Restriction: Work    May Return to Work: Other    Return to Work Instructions: No driving or working on pain medication            Follow-up Appointments  Future Appointments   Date Time Provider Department Center   8/25/2025  3:30 PM Lennox Tran DO [facility]   9/16/2025  2:30 PM Mary Yost APRN [facility]   6/8/2026 10:30 AM M[facility]MO[facility] L[facility]   6/8/2026 10:45 AM Mukund Castro MD [facility]     Additional Instructions for the Follow-ups that You Need to Schedule       Discharge Follow-up with Specified Provider: Follow-up Dr. Flowers 2-week; 2 Weeks   As directed      To: Follow-up Dr. Flowers 2-week   Follow Up: 2 Weeks                Test Results Pending at Discharge  Pending Labs       Order Current Status    Tissue Pathology Exam Collected (07/18/25 0959)            Long Flowers MD  07/19/25 16:24 EDT

## 2025-07-19 NOTE — PLAN OF CARE
Goal Outcome Evaluation:  Pt is discharging. Patient understanding all discharge teaching including, s/s of infection with surgical site, opioids, and smoking cessation. Patient is being taken home by family. Patient verbalized importance of follow-up visits.

## 2025-07-19 NOTE — PLAN OF CARE
Goal Outcome Evaluation:  Plan of Care Reviewed With: patient        Progress: no change  Outcome Evaluation: Patient remains A&Ox4. C/o abdominal pain, primarily on the right side. Requested pain medication not as strong as morphine - Dr. Flowers contacted and toradol 15mg q6hr prn for 3 doses added, as well as decreased prn morphine from 4mg to 2mg prn. Patient notified of this and prn toradol given x1 for pain control. Pt also utilizing pillow splinting. N&V throughout shift. Prn reglan and zofran administered. Barely tolerating clear liquids. Lap site dressings clean, dry, intact. Bowel sounds hypoactive. VSS, on room air. On cont. pulse ox. Mustafa in place draining clear, yellow urine. IV fluids/abx infusing per orders. Intermittently sleeping between care. Bed alert in use, stand-by assistance. Call light in reach, able to make needs known. No further issues/needs at this time.

## 2025-07-19 NOTE — THERAPY EVALUATION
Acute Care - Physical Therapy Initial Evaluation   Danay     Patient Name: Maria Ines Garcia  : 1975  MRN: [id number]  Today's Date: 2025   Onset of Illness/Injury or Date of Surgery: 25  Visit Dx:     ICD-10-CM ICD-9-CM   1. Difficulty in walking  R26.2 719.7   2. Sigmoid diverticulitis  K57.32 562.11     Patient Active Problem List   Diagnosis    Class 1 obesity due to excess calories without serious comorbidity with body mass index (BMI) of 30.0 to 30.9 in adult    Borderline systolic HTN    Chest pain    SOB (shortness of breath)    Intramural and submucous leiomyoma of uterus    Menorrhagia with regular cycle    Abnormal uterine bleeding (AUB)    Encounter for screening for malignant neoplasm of colon    Sigmoid diverticulitis    Status post laparoscopic colectomy     Past Medical History:   Diagnosis Date    Abnormal Pap smear of cervix 2024    LGSIL    Allergic     Anxiety     Atypical chest pain     FOLLOWS YEARLY WITH CARDIOLOGIST, NO RECENT CP OR SOA    COVID-19 2023    Diverticulitis of colon 2019    Diverticulosis     GI (gastrointestinal bleed)     Heavy periods     History of hepatitis B     Hyperlipidemia     FOLLOWS W/CARDIOLOGIST TO MANAGE B/P AND R/T FAMILY HX, NO CP OR SOA    Hypertension     Infectious viral hepatitis     Hep B    Rosacea     Seasonal allergies     Uterine fibroid     Varicella      Past Surgical History:   Procedure Laterality Date     SECTION      COLON RESECTION N/A 2025    Procedure: COLON RESECTION LAPAROSCOPIC SIGMOID WITH DAVINCI ROBOT, remove sigmoid colon;  Surgeon: Long Flowers MD;  Location: [facility] MAIN OR;  Service: Robotics - DaVinci;  Laterality: N/A;    COLONOSCOPY      COLONOSCOPY N/A 2025    Procedure: COLONOSCOPY with cold snare polypectomy, biopsies;  Surgeon: Jose Rosa MD;  Location: [facility] ENDOSCOPY;  Service: Gastroenterology;  Laterality: N/A;  diverticulosis, colon  polyp,    COLPOSCOPY  07/01/2024    CRANIOPLASTY FOR CRANIAL DEFECT      performed to design plates, 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,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    D & C HYSTEROSCOPY N/A 11/29/2019    Procedure: AND MYOSURE;  Surgeon: Mukund Castro MD;  Location: [facility];  Service: Obstetrics/Gynecology    D & C HYSTEROSCOPY ENDOMETRIAL ABLATION N/A 11/29/2019    Procedure: DILATATION AND CURETTAGE HYSTEROSCOPY WITH NOVASURE ENDOMETRIAL ABLATION;  Surgeon: Mukund Castro MD;  Location: [facility];  Service: Obstetrics/Gynecology    DILATATION AND CURETTAGE      MAB    TONSILLECTOMY AND ADENOIDECTOMY  1978    TUBAL ABDOMINAL LIGATION Bilateral 1999     PT Assessment (Last 12 Hours)       PT Evaluation and Treatment       Row Name 07/19/25 3833          Physical Therapy Time and Intention    Subjective Information no complaints  -DW     Document Type evaluation  -DW     Mode of Treatment individual therapy;physical therapy  -DW     Patient Effort excellent  -DW     Symptoms Noted During/After Treatment none  -DW       Row Name 07/19/25 3362          General Information    Patient Profile Reviewed yes  -DW     Onset of Illness/Injury or Date of Surgery 07/18/25  -DW     Referring Physician Long Flowers  -DW     Patient Observations alert;cooperative;agree to therapy  -DW     Prior Level of Function independent:;all household mobility;community mobility;ADL's  -DW     Equipment Currently Used at Home none  -DW     Existing Precautions/Restrictions no known precautions/restrictions  -DW     Risks Reviewed patient:  -DW     Benefits Reviewed patient:  -DW       Row Name 07/19/25 9670          Previous Level of Function/Home Environm    BADLs, Previous Functional Level independent  -DW     IADLs, Previous Functional Level independent  -DW     Bed Mobility, Previous Functional Level independent  -DW     Transfers, Previous Functional Level independent  -DW     Household Ambulation,  Previous Functional Level independent  -DW     Stairs, Previous Functional Level independent  -DW     Community Ambulation, Previous Functional Level independent  -DW       [facility] Name 07/19/25 1330          Living Environment    Current Living Arrangements home  -DW     People in Home alone  -DW     Primary Care Provided by self  -[facility] Name 07/19/25 1330          Home Use of Assistive/Adaptive Equipment    Equipment Currently Used at Home none  -DW       Row Name 07/19/25 1330          Pain    Pretreatment Pain Rating 5/10  -DW     Posttreatment Pain Rating 5/10  -DW     Pain Location abdomen  -DW     Pain Side/Orientation lower;right  -[facility] Name 07/19/25 1330          Cognition    Orientation Status (Cognition) oriented x 3  -DW     Follows Commands (Cognition) WNL  -     Cognitive Function (Cognition) WNL  -[facility] Name 07/19/25 1330          Range of Motion Comprehensive    General Range of Motion no range of motion deficits identified  -DW       Row Name 07/19/25 1330          Strength Comprehensive (MMT)    General Manual Muscle Testing (MMT) Assessment lower extremity strength deficits identified  -DW     Comment, General Manual Muscle Testing (MMT) Assessment 4+/5 MMT BLE  -[facility] Name 07/19/25 1330          Bed Mobility    Bed Mobility bed mobility (all) activities  -     All Activities, [place] (Bed Mobility) independent  -[facility] Name 07/19/25 1330          Transfers    Transfers sit-stand transfer  -[facility] Name 07/19/25 1330          Sit-Stand Transfer    Sit-Stand [place] (Transfers) independent  -DW       Row Name 07/19/25 1330          Gait/Stairs (Locomotion)    Gait/Stairs Locomotion gait/ambulation independence;distance ambulated  -DW     [place] Level (Gait) independent  -     Distance in Feet (Gait) 600  -DW       Row Name 07/19/25 1330          Balance    Balance Assessment standing dynamic balance  -     Dynamic Standing Balance  independent  -DW     Position/Device Used, Standing Balance unsupported  -DW       Row Name             Wound 07/18/25 0815 umbilical area    Wound - Properties Group Placement Date: 07/18/25  -KJ Placement Time: 0815  -KJ Present on Original Admission: N  -KJ Location: umbilical area  -KJ, 5 TROCARS, 1 LOWER OPEN INCISION, 1 PUNCTURE SITE     Retired Wound - Properties Group Placement Date: 07/18/25  -KJ Placement Time: 0815  -KJ Present on Original Admission: N  -KJ Location: umbilical area  -KJ, 5 TROCARS, 1 LOWER OPEN INCISION, 1 PUNCTURE SITE     Retired Wound - Properties Group Placement Date: 07/18/25  -KJ Placement Time: 0815  -KJ Present on Original Admission: N  -KJ Location: umbilical area  -KJ, 5 TROCARS, 1 LOWER OPEN INCISION, 1 PUNCTURE SITE     Retired Wound - Properties Group Date first assessed: 07/18/25  -KJ Time first assessed: 0815  -KJ Present on Original Admission: N  -KJ Location: umbilical area  -KJ, 5 TROCARS, 1 LOWER OPEN INCISION, 1 PUNCTURE SITE       Row Name 07/19/25 1330          Plan of Care Review    Plan of Care Reviewed With patient  -DW     Progress improving  -DW     Outcome Evaluation Pt presents with no functional limitations at this time.  She is independent with transfers and ambulating without AD.  Will DC PT at this time.  -DW       Row Name 07/19/25 1330          Positioning and Restraints    Pre-Treatment Position sitting in chair/recliner  -DW     Post Treatment Position chair  -DW     In Chair call light within reach;encouraged to call for assist  -DW       Row Name 07/19/25 4880          Therapy Assessment/Plan (PT)    PT Diagnosis (PT) Difficulty in walking  -DW     Rehab Potential (PT) good  -DW     Criteria for Skilled Interventions Met (PT) no;no problems identified which require skilled intervention  -DW     Therapy Frequency (PT) evaluation only  -DW       Row Name 07/19/25 8405          PT Evaluation Complexity    History, PT Evaluation Complexity no personal  factors and/or comorbidities  -DW     Examination of Body Systems (PT Eval Complexity) 1-2 elements  -DW     Clinical Presentation (PT Evaluation Complexity) stable  -DW     Clinical Decision Making (PT Evaluation Complexity) low complexity  -DW     Overall Complexity (PT Evaluation Complexity) low complexity  -DW       Row Name 07/19/25 5154          Therapy Plan Review/Discharge Plan (PT)    Therapy Plan Review (PT) evaluation/treatment results reviewed  -               User Key  (r) = Recorded By, (t) = Taken By, (c) = Cosigned By      Initials Name Provider Type    DW Rasta Leonard, PT Physical Therapist    Clara Canales RN Registered Nurse                    Physical Therapy Education       Title: PT OT SLP Therapies (Done)       Topic: Physical Therapy (Done)       Point: Mobility training (Done)       Learning Progress Summary            Patient Acceptance, E,TB, VU by  at 7/19/2025 1340                      Point: Home exercise program (Done)       Learning Progress Summary            Patient Acceptance, E,TB, VU by  at 7/19/2025 1340                      Point: Body mechanics (Done)       Learning Progress Summary            Patient Acceptance, E,TB, VU by  at 7/19/2025 1340                      Point: Precautions (Done)       Learning Progress Summary            Patient Acceptance, E,TB, VU by  at 7/19/2025 1340                                      User Key       Initials Effective Dates Name Provider Type Discipline     04/25/21 -  Rasta Leonard, PT Physical Therapist PT                  PT Recommendation and Plan  Anticipated Discharge Disposition (PT): home  Therapy Frequency (PT): evaluation only  Plan of Care Reviewed With: patient  Progress: improving  Outcome Evaluation: Pt presents with no functional limitations at this time.  She is independent with transfers and ambulating without AD.  Will DC PT at this time.   Outcome Measures       Row Name 07/19/25 4868             How  much help from another person do you currently need...    Turning from your back to your side while in flat bed without using bedrails? 4  -DW      Moving from lying on back to sitting on the side of a flat bed without bedrails? 4  -DW      Moving to and from a bed to a chair (including a wheelchair)? 4  -DW      Standing up from a chair using your arms (e.g., wheelchair, bedside chair)? 4  -DW      Climbing 3-5 steps with a railing? 4  -DW      To walk in hospital room? 4  -DW      AM-PAC 6 Clicks Score (PT) 24  -DW         Functional Assessment    Outcome Measure Options AM-PAC 6 Clicks Basic Mobility (PT)  -DW                User Key  (r) = Recorded By, (t) = Taken By, (c) = Cosigned By      Initials Name Provider Type    Rasta Archuleta, LALITO Physical Therapist                     Time Calculation:    PT Charges       Row Name 07/19/25 1342             Time Calculation    PT Received On 07/19/25  -DW         Untimed Charges    PT Eval/Re-eval Minutes 15  -DW         Total Minutes    Untimed Charges Total Minutes 15  -DW       Total Minutes 15  -DW                User Key  (r) = Recorded By, (t) = Taken By, (c) = Cosigned By      Initials Name Provider Type    Rasta Archuleta PT Physical Therapist                  Therapy Charges for Today       Code Description Service Date Service Provider Modifiers Qty    [id number] HC PT EVAL LOW COMPLEXITY 2 7/19/2025 Rasta Leonard, LALITO GP 1            PT G-Codes  Outcome Measure Options: AM-PAC 6 Clicks Basic Mobility (PT)  AM-PAC 6 Clicks Score (PT): 24    Rasta Leonard PT  7/19/2025

## 2025-07-19 NOTE — CONSULTS
Patient Name: Maria Ines Garcia  YOB: 1975  MRN: [id number]  Admission date: 2025  Reason for Encounter: MST 4-5    [facility] Clinical Nutrition Assessment     Subjective    Subjective Information     : Nutrition assessment for MST 4, however, no nutrition indicators documented per EMR. Pt POD1 laparoscopic colectomy r/t chronic diverticulitis. Pt seen s/p physical therpay with good spirit. Reports decreased oral intake by 75% PTA d/t stomach discomfort, constant bloating, and constipation - appreciates muscle atrophy and weight loss. Currently on a CLD with good tolerance and no complaints. Pt meets malnutrition criteria related to prolonged inadequate oral intake and clinically significant wt loss. Diet education provided for s/p procedure and for weight maintenance, details provided below      Objective   H&P and Current Problems      H&P  Past Medical History:   Diagnosis Date    Abnormal Pap smear of cervix 2024    LGSIL    Allergic     Anxiety     Atypical chest pain     FOLLOWS YEARLY WITH CARDIOLOGIST, NO RECENT CP OR SOA    COVID-19 2023    Diverticulitis of colon 2019    Diverticulosis     GI (gastrointestinal bleed)     Heavy periods     History of hepatitis B     Hyperlipidemia     FOLLOWS W/CARDIOLOGIST TO MANAGE B/P AND R/T FAMILY HX, NO CP OR SOA    Hypertension     Infectious viral hepatitis     Hep B    Rosacea     Seasonal allergies     Uterine fibroid     Varicella       Past Surgical History:   Procedure Laterality Date     SECTION      COLON RESECTION N/A 2025    Procedure: COLON RESECTION LAPAROSCOPIC SIGMOID WITH DAVINCI ROBOT, remove sigmoid colon;  Surgeon: Long Flowers MD;  Location: [facility] MAIN OR;  Service: Robotics - DaVinci;  Laterality: N/A;    COLONOSCOPY      COLONOSCOPY N/A 2025    Procedure: COLONOSCOPY with cold snare polypectomy, biopsies;  Surgeon: Jose Rosa MD;  Location: [facility]  "ENDOSCOPY;  Service: Gastroenterology;  Laterality: N/A;  diverticulosis, colon polyp,    COLPOSCOPY  07/01/2024    CRANIOPLASTY FOR CRANIAL DEFECT      performed to design plates, 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,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    D & C HYSTEROSCOPY N/A 11/29/2019    Procedure: AND MYOSURE;  Surgeon: Mukund Castro MD;  Location: [facility] OR;  Service: Obstetrics/Gynecology    D & C HYSTEROSCOPY ENDOMETRIAL ABLATION N/A 11/29/2019    Procedure: DILATATION AND CURETTAGE HYSTEROSCOPY WITH NOVASURE ENDOMETRIAL ABLATION;  Surgeon: Mukund Castro MD;  Location: [facility] OR;  Service: Obstetrics/Gynecology    DILATATION AND CURETTAGE      MAB    TONSILLECTOMY AND ADENOIDECTOMY  1978    TUBAL ABDOMINAL LIGATION Bilateral 1999      Current Problems   Admission Diagnosis:  Sigmoid diverticulitis [K57.32]  Status post laparoscopic colectomy [Z90.49]    Problem List:    Sigmoid diverticulitis    Status post laparoscopic colectomy      Other Applicable Nutrition Information:   N/A     Anthropometrics       Height: 162.6 cm (64\")  Weight: 75.4 kg (166 lb 3.6 oz) (07/18/25 0634)  Weight Method: Standing scale  BMI (Calculated): 28.5       Trending Weight Changes 07/19/25: weight loss of 15 lbs (8.2%) over 5 month(s)    Significant?  Yes       Weight History  Wt Readings from Last 20 Encounters:   07/18/25 0634 75.4 kg (166 lb 3.6 oz)   07/11/25 1426 75 kg (165 lb 5.5 oz)   06/06/25 1136 75.8 kg (167 lb)   06/05/25 1508 77.1 kg (169 lb 14.4 oz)   05/30/25 1522 77 kg (169 lb 12.1 oz)   05/22/25 1605 77.6 kg (171 lb)   05/07/25 0843 76.1 kg (167 lb 12.3 oz)   04/26/25 1702 77.9 kg (171 lb 11.2 oz)   04/09/25 1557 79.6 kg (175 lb 6.4 oz)   03/11/25 0846 81.9 kg (180 lb 9.6 oz)   03/04/25 0931 79.4 kg (175 lb 1.6 oz)   03/02/25 1525 78.9 kg (174 lb)   02/26/25 1426 80.1 kg (176 lb 8 oz)   02/07/25 1541 81.7 kg (180 lb 3.2 oz)   12/12/24 1501 85.3 kg (188 lb)   11/21/24 1531 86 kg (189 lb " 11.2 oz)   10/29/24 0949 87.6 kg (193 lb 3.2 oz)   10/23/24 1201 88.5 kg (195 lb)   10/08/24 1617 87.1 kg (192 lb)   09/18/24 1520 86.2 kg (190 lb)            Labs      Comment: Reviewed     Results from last 7 days   Lab Units 07/19/25  0455   SODIUM mmol/L 140   POTASSIUM mmol/L 3.6   GLUCOSE mg/dL 117*   BUN mg/dL 7.4   CREATININE mg/dL 0.72   CALCIUM mg/dL 8.8     Results from last 7 days   Lab Units 07/19/25  0455   PLATELETS 10*3/mm3 202   HEMOGLOBIN g/dL 11.4*   HEMATOCRIT % 34.7     Lab Results   Component Value Date    HGBA1C 6.10 (H) 01/15/2025          Medications       Scheduled Medications alvimopan, 12 mg, Oral, BID  bisoprolol, 5 mg, Oral, Daily  buPROPion XL, 150 mg, Oral, QAM  enoxaparin sodium, 40 mg, Subcutaneous, Daily  polyethylene glycol, 17 g, Oral, Daily  rosuvastatin, 5 mg, Oral, Daily        Infusions lactated ringers, 75 mL/hr, Last Rate: Stopped (07/19/25 1213)        PRN Medications   ketorolac    Morphine **AND** naloxone    ondansetron     Physical Findings      Chewing/Swallowing  Teeth Status: Mouth/Teeth WDL: .WDL except, teeth Teeth Symptoms: dental appliance present  Chewing/Swallowing Issues: No issues identified at this time   Edema                            Bowel Function  Stool Output  Perineal Care: catheter care provided (07/19/25 0624)  Last Bowel Movement: 07/18/25 (per pt statement)   I/Os  Intake & Output (last 3 days)         07/16 0701  07/17 0700 07/17 0701  07/18 0700 07/18 0701  07/19 0700 07/19 0701  07/20 0700    P.O.   240 716    I.V. (mL/kg)   2934 (38.9)     Other   750     IV Piggyback   200     Total Intake(mL/kg)   4124 (54.7) 716 (9.5)    Urine (mL/kg/hr)   250 (0.1)     Emesis/NG output   300     Blood   200     Total Output   750     Net   +3374 +716            Emesis Unmeasured Occurrence   1 x            Lines, Drains, Airways, & Wounds       Active LDAs       Name Placement date Placement time Site Days Last dressing change    Peripheral IV 07/18/25  1601 22 G Left Antecubital 07/18/25  1601  Antecubital  less than 1     Wound 07/18/25 0815 umbilical area 07/18/25  0815  -- 1                       Nutrition Focused Physical Exam     Trending NFPE 07/19/25:Not preformed at initial, pt was preparing to dc during visit      Malnutrition Severity Assessment      Patient meets criteria for : Moderate (non-severe) Malnutrition  Malnutrition Type (Last 8 Hours)       Malnutrition Severity Assessment       Row Name 07/19/25 1258       Malnutrition Severity Assessment    Malnutrition Type Chronic Disease - Related Malnutrition      Row Name 07/19/25 1258       Insufficient Energy Intake     Insufficient Energy Intake Findings Moderate    Insufficient Energy Intake  <75% of est. energy requirement for > or equal to 1 month      Row Name 07/19/25 1258       Unintentional Weight Loss     Unintentional Weight Loss Findings Moderate    Unintentional Weight Loss  Weight loss greater than 7.5% in three months      Row Name 07/19/25 1258       Criteria Met (Must meet criteria for severity in at least 2 of these categories: M Wasting, Fat Loss, Fluid, Secondary Signs, Wt. Status, Intake)    Patient meets criteria for  Moderate (non-severe) Malnutrition                     Estimated Needs      Energy Requirements    Weight for Calculation 54.7kg IBW    Method for Estimation  30-35 kcals/kg   Daily Needs (kcal/day) 1641-1914   Protein Requirements    Weight for Calculation 54.7kg IBW    Method for Estimation 1.2-2.0 gm/kg   Daily Needs (g/day) 65.6-109   Fluid Requirements     Method for Estimation 1 mL/kcal    Daily Needs (mL/day) 2800-6997     Current Nutrition Orders & Evaluation of Intake      Oral Nutrition     Food Allergies  and Intolerances NKFA   Current PO Diet Diet: Liquid; Clear Liquid; Fluid Consistency: Thin (IDDSI 0)   Oral Nutrition Supplement None     Trending % PO Intake 07/19/25:75% x1meal      Enteral Nutrition     Current EN Order Patient isn't on Tube  Feeding  Patient doesn't have any tube feeding modular orders     EN Route      EN Tolerance     EN Observation/Intake         Parenteral Nutrition     Current TPN Order    TPN Route    Lipids (mL/%/frequency)     Total # Days on TPN    TPN Observation/Intake       Assessment & Plan   Nutrition Diagnosis and Goals       Nutrition Diagnosis 1 Moderate chronic disease or condition related malnutrition related to depressed appetite and intake at meals as evidence by 8% BW loss x5mo       Nutrition Diagnosis 2 Food and Nutrition Related Knowledge Deficit  related to no previous diet education as evidence by need for education s/p colectomy         Goal(s) Establish PO Intake and MNT Compliance      Nutrition Intervention and Prescription       Intervention  Provide Education      Diet Prescription Clear Liquid Diet     Supplement Prescription Boost Breeze TID until diet adv to full liquids     Education Provided  Provided general nutrition education s/p GI surgery and for weight maintenance. Dw pt the importance of adequate protein intake at dc to help preserve/grow suspected lost muscle w/ prolonged inadequate oral intake, discussed protein sources, and the use of ONS until po intake back to baseline. Encouraged decreased intake of high fat/greasy foods for at least 1 week post surgery, encouraged gradual increase in fiber intake starting with cooked vegetables and potential fiber supplement if diet inadequate, encouraged adequate hydration      Enteral Prescription        TPN Prescription      Monitoring/Evaluation       Monitor/Evaluation PO Intake, Oral Nutrition Supplement Intake, Weight, and MNT compliance      RD Follow-Up Encounter 3-5 days     Electronically signed by:  Chelle Bergman RD  07/19/25 12:59 EDT

## 2025-07-21 ENCOUNTER — READMISSION MANAGEMENT (OUTPATIENT)
Dept: CALL CENTER | Facility: HOSPITAL | Age: 50
End: 2025-07-21
Payer: COMMERCIAL

## 2025-07-21 ENCOUNTER — TRANSITIONAL CARE MANAGEMENT TELEPHONE ENCOUNTER (OUTPATIENT)
Dept: CALL CENTER | Facility: HOSPITAL | Age: 50
End: 2025-07-21
Payer: COMMERCIAL

## 2025-07-21 NOTE — OUTREACH NOTE
Prep Survey      Flowsheet Row Responses   [facility] patient discharged from? [facility]   Is LACE score < 7 ? Yes   Eligibility [facility]   Date of Admission 07/18/25   Date of Discharge 07/19/25   Discharge Disposition Home or Self Care   Discharge diagnosis Sigmoid diverticulitis   Does the patient have one of the following disease processes/diagnoses(primary or secondary)? General Surgery   Does the patient have Home health ordered? No   Is there a DME ordered? No   Medication alerts for this patient see AVS   Prep survey completed? Yes            Katerina ISAAC - Registered Nurse

## 2025-07-21 NOTE — OUTREACH NOTE
Call Center TCM Note      Flowsheet Row Responses   [facility] patient discharged from? [facility]   Does the patient have one of the following disease processes/diagnoses(primary or secondary)? General Surgery   TCM attempt successful? Yes   Call start time 0951   Call end time 0954   Discharge diagnosis Sigmoid diverticulitis   Meds reviewed with patient/caregiver? Yes   Is the patient having any side effects they believe may be caused by any medication additions or changes? No   Does the patient have all medications related to this admission filled (includes all antibiotics, pain medications, etc.) Yes   Is the patient taking all medications as directed (includes completed medication regime)? Yes   Comments Post-op appt with surgeon Dr. Flowers on 8/5   Does the patient have an appointment with their PCP within 7-14 days of discharge? No   Nursing Interventions Patient desires to follow up with specialty only   Has home health visited the patient within 72 hours of discharge? N/A   Psychosocial issues? No   Did the patient receive a copy of their discharge instructions? Yes   Nursing interventions Reviewed instructions with patient   What is the patient's perception of their health status since discharge? Improving   Nursing interventions Nurse provided patient education   Is the patient /caregiver able to teach back basic post-op care? Practice 'cough and deep breath', Drive as instructed by MD in discharge instructions, Take showers only when approved by MD-sponge bathe until then, No tub bath, swimming, or hot tub until instructed by MD, Lifting as instructed by MD in discharge instructions, Do not remove steri-strips, Keep incision areas clean,dry and protected  [states she did not take her incentive spirometer home]   Is the patient/caregiver able to teach back signs and symptoms of incisional infection? Fever, Pus or odor from incision, Incisional warmth, Increased drainage or bleeding, Increased redness,  swelling or pain at the incisonal site   Is the patient/caregiver able to teach back steps to recovery at home? Set small, achievable goals for return to baseline health, Rest and rebuild strength, gradually increase activity   If the patient is a current smoker, are they able to teach back resources for cessation? Not a smoker   Is the patient/caregiver able to teach back the hierarchy of who to call/visit for symptoms/problems? PCP, Specialist, Home health nurse, Urgent Care, ED, 911 Yes   TCM call completed? Yes   Wrap up additional comments Doing well, denies any questions or concerns, following her discharge instructions closely, states she will be following up with her surgeon.   Call end time 0954   Would this patient benefit from a Referral to [facility] Social Work? No   Is the patient interested in additional calls from an ambulatory ? No            Detsiny BARCLAY - Registered Nurse    7/21/2025, 09:54 EDT

## 2025-08-06 ENCOUNTER — OFFICE VISIT (OUTPATIENT)
Dept: SURGERY | Facility: CLINIC | Age: 50
End: 2025-08-06
Payer: COMMERCIAL

## 2025-08-06 VITALS — HEIGHT: 64 IN | WEIGHT: 163.5 LBS | BODY MASS INDEX: 27.91 KG/M2

## 2025-08-06 DIAGNOSIS — Z90.49 S/P LAPAROSCOPIC COLECTOMY: Primary | ICD-10-CM

## 2025-08-06 PROCEDURE — 99024 POSTOP FOLLOW-UP VISIT: CPT | Performed by: SURGERY

## 2025-08-12 ENCOUNTER — TELEPHONE (OUTPATIENT)
Dept: SURGERY | Facility: CLINIC | Age: 50
End: 2025-08-12
Payer: COMMERCIAL

## 2025-08-25 ENCOUNTER — OFFICE VISIT (OUTPATIENT)
Dept: FAMILY MEDICINE CLINIC | Facility: CLINIC | Age: 50
End: 2025-08-25
Payer: COMMERCIAL

## 2025-08-25 VITALS
HEART RATE: 70 BPM | WEIGHT: 165.8 LBS | TEMPERATURE: 97.6 F | HEIGHT: 64 IN | DIASTOLIC BLOOD PRESSURE: 74 MMHG | SYSTOLIC BLOOD PRESSURE: 118 MMHG | BODY MASS INDEX: 28.31 KG/M2 | OXYGEN SATURATION: 97 %

## 2025-08-25 DIAGNOSIS — R73.03 PREDIABETES: ICD-10-CM

## 2025-08-25 DIAGNOSIS — G89.29 CHRONIC PAIN OF RIGHT ANKLE: ICD-10-CM

## 2025-08-25 DIAGNOSIS — M25.571 CHRONIC PAIN OF RIGHT ANKLE: ICD-10-CM

## 2025-08-25 DIAGNOSIS — Z90.49 HISTORY OF PARTIAL COLECTOMY: Primary | ICD-10-CM

## 2025-08-25 DIAGNOSIS — M65.4 DE QUERVAIN'S TENOSYNOVITIS, RIGHT: ICD-10-CM

## 2025-08-25 DIAGNOSIS — K57.32 SIGMOID DIVERTICULITIS: ICD-10-CM

## 2025-08-25 PROCEDURE — 99214 OFFICE O/P EST MOD 30 MIN: CPT | Performed by: FAMILY MEDICINE

## 2025-08-25 RX ORDER — LANOLIN ALCOHOL/MO/W.PET/CERES
1000 CREAM (GRAM) TOPICAL DAILY
Qty: 90 TABLET | Refills: 1 | Status: SHIPPED | OUTPATIENT
Start: 2025-08-25

## 2025-08-25 RX ORDER — METHYLPREDNISOLONE 4 MG/1
TABLET ORAL
Qty: 21 EACH | Refills: 0 | Status: SHIPPED | OUTPATIENT
Start: 2025-08-25

## 2025-08-25 RX ORDER — LORATADINE 10 MG/1
10 TABLET ORAL DAILY
Qty: 90 TABLET | Refills: 3 | Status: SHIPPED | OUTPATIENT
Start: 2025-08-25

## 2025-08-25 RX ORDER — POLYETHYLENE GLYCOL 3350 17 G/17G
17 POWDER, FOR SOLUTION ORAL DAILY
Qty: 476 G | Refills: 3 | Status: SHIPPED | OUTPATIENT
Start: 2025-08-25

## (undated) DEVICE — VESSEL SEALER EXTEND: Brand: ENDOWRIST

## (undated) DEVICE — TUBING, SUCTION, 1/4" X 10', STRAIGHT: Brand: MEDLINE

## (undated) DEVICE — SKIN PREP TRAY W/CHG: Brand: MEDLINE INDUSTRIES, INC.

## (undated) DEVICE — PENCL ES MEGADINE EZ/CLEAN BUTN W/HOLSTR 10FT

## (undated) DEVICE — PAD GRND REM POLYHESIVE A/ DISP

## (undated) DEVICE — GLV SURG BIOGEL LTX PF 7 1/2

## (undated) DEVICE — SYR LUERLOK 30CC

## (undated) DEVICE — VAGINAL PREP TRAY: Brand: MEDLINE INDUSTRIES, INC.

## (undated) DEVICE — STERILE POLYISOPRENE POWDER-FREE SURGICAL GLOVES: Brand: PROTEXIS

## (undated) DEVICE — SUT PDS 1 CT1 36IN Z347H

## (undated) DEVICE — PAD SANI MAXI W/ADHS SNG WRP 11IN

## (undated) DEVICE — SHEET,DRAPE,70X85,STERILE: Brand: MEDLINE

## (undated) DEVICE — SINGLE-USE BIOPSY FORCEPS: Brand: RADIAL JAW 4

## (undated) DEVICE — SUT VIC PLS CTD BR 0 TIE 18IN VIL

## (undated) DEVICE — THE STERILE LIGHT HANDLE COVER IS USED WITH STERIS SURGICAL LIGHTING AND VISUALIZATION SYSTEMS.

## (undated) DEVICE — LAPAROSCOPIC SCISSORS: Brand: EPIX LAPAROSCOPIC SCISSORS

## (undated) DEVICE — SUT VIC 0 UR6 27IN VCP603H

## (undated) DEVICE — GAUZE,PACKING STRIP,PLAIN,1/4"X5YD,STRL: Brand: CURAD

## (undated) DEVICE — INTENDED FOR TISSUE SEPARATION, AND OTHER PROCEDURES THAT REQUIRE A SHARP SURGICAL BLADE TO PUNCTURE OR CUT.: Brand: BARD-PARKER ® CARBON RIB-BACK BLADES

## (undated) DEVICE — DEFENDO AIR WATER SUCTION AND BIOPSY VALVE KIT FOR  OLYMPUS: Brand: DEFENDO AIR/WATER/SUCTION AND BIOPSY VALVE

## (undated) DEVICE — SUREFORM 45: Brand: SUREFORM

## (undated) DEVICE — 1LYRTR 16FR10ML100%SIL UMS SNP: Brand: MEDLINE INDUSTRIES, INC.

## (undated) DEVICE — 40585 XL ADVANCED TRENDELENBURG POSITIONING KIT: Brand: 40585 XL ADVANCED TRENDELENBURG POSITIONING KIT

## (undated) DEVICE — STERILE POLYISOPRENE POWDER-FREE SURGICAL GLOVES WITH EMOLLIENT COATING: Brand: PROTEXIS

## (undated) DEVICE — YANKAUER,BULB TIP,W/O VENT,RIGID,STERILE: Brand: MEDLINE

## (undated) DEVICE — SOL IRR H2O BG 1000ML STRL

## (undated) DEVICE — PROXIMATE RH ROTATING HEAD SKIN STAPLERS (35 WIDE) CONTAINS 35 STAINLESS STEEL STAPLES: Brand: PROXIMATE

## (undated) DEVICE — SYS CLOSE PORTII CARTR/THOMASN XL

## (undated) DEVICE — SLV SCD KN/LEN ADJ EXPRSS BLENDED MD 1P/U

## (undated) DEVICE — SOL IRR H2O BO 1000ML STRL

## (undated) DEVICE — ST TBG ENDOMAT HYSTSCPY

## (undated) DEVICE — ST IRR CYSTO W/SPK 77IN LF

## (undated) DEVICE — SOL IRR NACL 0.9PCT 3000ML

## (undated) DEVICE — DEV TISS REMOV MYOSURE/XL HYSTEROSCP

## (undated) DEVICE — TIP COVER ACCESSORY

## (undated) DEVICE — SOL IRRG H2O PL/BG 1000ML STRL

## (undated) DEVICE — ENDOPATH XCEL WITH OPTIVIEW TECHNOLOGY BLADELESS TROCARS WITH STABILITY SLEEVES: Brand: ENDOPATH XCEL OPTIVIEW

## (undated) DEVICE — SEAL

## (undated) DEVICE — DRAPE,UNDERBUTTOCKS,PCH,STERILE: Brand: MEDLINE

## (undated) DEVICE — BLADELESS OBTURATOR: Brand: WECK VISTA

## (undated) DEVICE — NON-WOVEN ADHESIVE WOUND DRESSING: Brand: PRIMAPORE ADHESIVE DRESSING 10*8CM

## (undated) DEVICE — ARM DRAPE

## (undated) DEVICE — REDUCER: Brand: ENDOWRIST

## (undated) DEVICE — ANTIBACTERIAL UNDYED BRAIDED (POLYGLACTIN 910), SYNTHETIC ABSORBABLE SUTURE: Brand: COATED VICRYL

## (undated) DEVICE — 3M™ IOBAN™ 2 ANTIMICROBIAL INCISE DRAPE 6650EZ: Brand: IOBAN™ 2

## (undated) DEVICE — THE SINGLE USE ETRAP – POLYP TRAP IS USED FOR SUCTION RETRIEVAL OF ENDOSCOPICALLY REMOVED POLYPS.: Brand: ETRAP

## (undated) DEVICE — SNAR E/S POLYP SNAREMASTER OVL/10MM 2.8X2300MM YEL

## (undated) DEVICE — PREMIUM WET SKIN PREP TRAY: Brand: MEDLINE INDUSTRIES, INC.

## (undated) DEVICE — SEAL HYSTERSCOPE/OUTFLOW CHANNEL MYOSURE

## (undated) DEVICE — DAVINCI-LF: Brand: MEDLINE INDUSTRIES, INC.

## (undated) DEVICE — SOL NACL 0.9PCT 1000ML

## (undated) DEVICE — LOU D & C HYSTEROSCOPY: Brand: MEDLINE INDUSTRIES, INC.

## (undated) DEVICE — SYR LL TP 10ML STRL

## (undated) DEVICE — STRAP STIRUP WO/ RNG

## (undated) DEVICE — Device

## (undated) DEVICE — SUT SILK 2/0 SH 30IN K833H

## (undated) DEVICE — LAPAROVUE VISIBILITY SYSTEM LAPAROSCOPIC SOLUTIONS: Brand: LAPAROVUE

## (undated) DEVICE — GLV SURG SENSICARE PI LF PF 7.5 GRN STRL

## (undated) DEVICE — PROB ABL ENDOMTRL NOVASURE/G4 W/SURESND

## (undated) DEVICE — SUT PROLN 2/0 PC3 8833H

## (undated) DEVICE — SOLIDIFIER LIQLOC PLS 1500CC BT

## (undated) DEVICE — ACCESS PLATFORM FOR MINIMALLY INVASIVE SURGERY.: Brand: GELPORT® LAPAROSCOPIC  SYSTEM

## (undated) DEVICE — GLV SURG TRIUMPH CLASSIC PF LTX 7.5 STRL

## (undated) DEVICE — NON-WOVEN ADHESIVE WOUND DRESSING: Brand: PRIMAPORE ADHESIVE WOUND DRSG 7.2*5CM

## (undated) DEVICE — 40583 XL ADVANCED TRENDELENBURG POSITIONING KIT: Brand: 40583 XL ADVANCED TRENDELENBURG POSITIONING KIT